# Patient Record
Sex: FEMALE | Race: OTHER | HISPANIC OR LATINO | Employment: OTHER | URBAN - METROPOLITAN AREA
[De-identification: names, ages, dates, MRNs, and addresses within clinical notes are randomized per-mention and may not be internally consistent; named-entity substitution may affect disease eponyms.]

---

## 2017-01-03 ENCOUNTER — ALLSCRIPTS OFFICE VISIT (OUTPATIENT)
Dept: OTHER | Facility: OTHER | Age: 47
End: 2017-01-03

## 2017-01-05 ENCOUNTER — ALLSCRIPTS OFFICE VISIT (OUTPATIENT)
Dept: OTHER | Facility: OTHER | Age: 47
End: 2017-01-05

## 2017-01-27 ENCOUNTER — HOSPITAL ENCOUNTER (OUTPATIENT)
Facility: HOSPITAL | Age: 47
Setting detail: OUTPATIENT SURGERY
Discharge: HOME | End: 2017-01-27
Attending: ORTHOPAEDIC SURGERY | Admitting: ORTHOPAEDIC SURGERY
Payer: COMMERCIAL

## 2017-01-27 VITALS
HEIGHT: 61 IN | WEIGHT: 208 LBS | TEMPERATURE: 97.8 F | OXYGEN SATURATION: 99 % | SYSTOLIC BLOOD PRESSURE: 115 MMHG | BODY MASS INDEX: 39.27 KG/M2 | DIASTOLIC BLOOD PRESSURE: 60 MMHG | HEART RATE: 69 BPM | RESPIRATION RATE: 18 BRPM

## 2017-01-27 RX ORDER — TRAMADOL HYDROCHLORIDE 50 MG/1
50 TABLET ORAL 2 TIMES DAILY
Status: ON HOLD | COMMUNITY
End: 2017-08-02

## 2017-01-27 RX ORDER — GABAPENTIN 300 MG/1
600 CAPSULE ORAL 2 TIMES DAILY
COMMUNITY
End: 2018-08-02

## 2017-01-27 RX ORDER — NAPROXEN 500 MG/1
500 TABLET ORAL 2 TIMES DAILY WITH MEALS
COMMUNITY
End: 2017-11-08 | Stop reason: HOSPADM

## 2017-01-27 RX ORDER — HYDROCODONE BITARTRATE AND ACETAMINOPHEN 5; 325 MG/1; MG/1
2 TABLET ORAL EVERY 6 HOURS PRN
Qty: 30 TABLET | Refills: 0 | Status: SHIPPED | OUTPATIENT
Start: 2017-01-27 | End: 2017-02-06

## 2017-01-27 RX ORDER — GABAPENTIN 300 MG/1
900 CAPSULE ORAL
Status: ON HOLD | COMMUNITY
End: 2017-11-06 | Stop reason: ALTCHOICE

## 2017-01-27 RX ORDER — DIPHENOXYLATE HYDROCHLORIDE AND ATROPINE SULFATE 2.5; .025 MG/1; MG/1
1 TABLET ORAL DAILY
COMMUNITY
End: 2018-08-02

## 2017-01-30 ENCOUNTER — TRANSCRIBE ORDERS (OUTPATIENT)
Dept: ADMINISTRATIVE | Facility: HOSPITAL | Age: 47
End: 2017-01-30

## 2017-01-30 DIAGNOSIS — Z12.31 SCREENING MAMMOGRAM FOR HIGH-RISK PATIENT: Primary | ICD-10-CM

## 2017-02-07 ENCOUNTER — ALLSCRIPTS OFFICE VISIT (OUTPATIENT)
Dept: OTHER | Facility: OTHER | Age: 47
End: 2017-02-07

## 2017-02-08 ENCOUNTER — ALLSCRIPTS OFFICE VISIT (OUTPATIENT)
Dept: OTHER | Facility: OTHER | Age: 47
End: 2017-02-08

## 2017-02-08 LAB — HCG, QUALITATIVE (HISTORICAL): NEGATIVE

## 2017-02-13 ENCOUNTER — OFFICE VISIT (OUTPATIENT)
Dept: URGENT CARE | Age: 47
End: 2017-02-13
Payer: COMMERCIAL

## 2017-02-13 ENCOUNTER — GENERIC CONVERSION - ENCOUNTER (OUTPATIENT)
Dept: OTHER | Facility: OTHER | Age: 47
End: 2017-02-13

## 2017-02-13 PROCEDURE — 99283 EMERGENCY DEPT VISIT LOW MDM: CPT | Performed by: FAMILY MEDICINE

## 2017-02-13 PROCEDURE — G0382 LEV 3 HOSP TYPE B ED VISIT: HCPCS | Performed by: FAMILY MEDICINE

## 2017-02-16 ENCOUNTER — ALLSCRIPTS OFFICE VISIT (OUTPATIENT)
Dept: OTHER | Facility: OTHER | Age: 47
End: 2017-02-16

## 2017-03-03 ENCOUNTER — ALLSCRIPTS OFFICE VISIT (OUTPATIENT)
Dept: OTHER | Facility: OTHER | Age: 47
End: 2017-03-03

## 2017-03-13 ENCOUNTER — HOSPITAL ENCOUNTER (OUTPATIENT)
Dept: RADIOLOGY | Facility: HOSPITAL | Age: 47
Discharge: HOME | End: 2017-03-13
Payer: COMMERCIAL

## 2017-03-13 DIAGNOSIS — Z12.31 SCREENING MAMMOGRAM FOR HIGH-RISK PATIENT: ICD-10-CM

## 2017-03-13 PROCEDURE — G0202 SCR MAMMO BI INCL CAD: HCPCS

## 2017-03-16 ENCOUNTER — ALLSCRIPTS OFFICE VISIT (OUTPATIENT)
Dept: OTHER | Facility: OTHER | Age: 47
End: 2017-03-16

## 2017-03-16 DIAGNOSIS — E66.01 MORBID (SEVERE) OBESITY DUE TO EXCESS CALORIES (HCC): ICD-10-CM

## 2017-03-16 DIAGNOSIS — I83.90 ASYMPTOMATIC VARICOSE VEINS OF LOWER EXTREMITY: ICD-10-CM

## 2017-03-16 DIAGNOSIS — N91.5 OLIGOMENORRHEA: ICD-10-CM

## 2017-03-21 ENCOUNTER — ALLSCRIPTS OFFICE VISIT (OUTPATIENT)
Dept: OTHER | Facility: OTHER | Age: 47
End: 2017-03-21

## 2017-04-06 ENCOUNTER — ALLSCRIPTS OFFICE VISIT (OUTPATIENT)
Dept: OTHER | Facility: OTHER | Age: 47
End: 2017-04-06

## 2017-04-07 ENCOUNTER — APPOINTMENT (OUTPATIENT)
Dept: LAB | Facility: HOSPITAL | Age: 47
End: 2017-04-07
Attending: SURGERY
Payer: COMMERCIAL

## 2017-04-07 ENCOUNTER — TRANSCRIBE ORDERS (OUTPATIENT)
Dept: LAB | Facility: HOSPITAL | Age: 47
End: 2017-04-07

## 2017-04-07 DIAGNOSIS — N91.5 OLIGOMENORRHEA: ICD-10-CM

## 2017-04-07 DIAGNOSIS — E66.01 MORBID (SEVERE) OBESITY DUE TO EXCESS CALORIES (HCC): ICD-10-CM

## 2017-04-07 DIAGNOSIS — I83.90 ASYMPTOMATIC VARICOSE VEINS OF LOWER EXTREMITY: ICD-10-CM

## 2017-04-07 LAB
ALBUMIN SERPL BCP-MCNC: 3.5 G/DL (ref 3.5–5)
ALP SERPL-CCNC: 96 U/L (ref 46–116)
ALT SERPL W P-5'-P-CCNC: 36 U/L (ref 12–78)
ANION GAP SERPL CALCULATED.3IONS-SCNC: 3 MMOL/L (ref 4–13)
AST SERPL W P-5'-P-CCNC: 21 U/L (ref 5–45)
BILIRUB SERPL-MCNC: 0.52 MG/DL (ref 0.2–1)
BUN SERPL-MCNC: 16 MG/DL (ref 5–25)
CALCIUM SERPL-MCNC: 8.6 MG/DL (ref 8.3–10.1)
CHLORIDE SERPL-SCNC: 105 MMOL/L (ref 100–108)
CHOLEST SERPL-MCNC: 216 MG/DL (ref 50–200)
CO2 SERPL-SCNC: 29 MMOL/L (ref 21–32)
CREAT SERPL-MCNC: 0.62 MG/DL (ref 0.6–1.3)
ERYTHROCYTE [DISTWIDTH] IN BLOOD BY AUTOMATED COUNT: 13.4 % (ref 11.6–15.1)
GFR SERPL CREATININE-BSD FRML MDRD: >60 ML/MIN/1.73SQ M
GLUCOSE P FAST SERPL-MCNC: 89 MG/DL (ref 65–99)
HCT VFR BLD AUTO: 40.9 % (ref 34.8–46.1)
HDLC SERPL-MCNC: 39 MG/DL (ref 40–60)
HGB BLD-MCNC: 14 G/DL (ref 11.5–15.4)
LDLC SERPL CALC-MCNC: 149 MG/DL (ref 0–100)
MCH RBC QN AUTO: 31.3 PG (ref 26.8–34.3)
MCHC RBC AUTO-ENTMCNC: 34.2 G/DL (ref 31.4–37.4)
MCV RBC AUTO: 91 FL (ref 82–98)
PLATELET # BLD AUTO: 215 THOUSANDS/UL (ref 149–390)
PMV BLD AUTO: 10.3 FL (ref 8.9–12.7)
POTASSIUM SERPL-SCNC: 4.3 MMOL/L (ref 3.5–5.3)
PROT SERPL-MCNC: 7.1 G/DL (ref 6.4–8.2)
RBC # BLD AUTO: 4.48 MILLION/UL (ref 3.81–5.12)
SODIUM SERPL-SCNC: 137 MMOL/L (ref 136–145)
TRIGL SERPL-MCNC: 139 MG/DL
TSH SERPL DL<=0.05 MIU/L-ACNC: 1.28 UIU/ML (ref 0.36–3.74)
WBC # BLD AUTO: 6.63 THOUSAND/UL (ref 4.31–10.16)

## 2017-04-07 PROCEDURE — 80061 LIPID PANEL: CPT

## 2017-04-07 PROCEDURE — 80053 COMPREHEN METABOLIC PANEL: CPT

## 2017-04-07 PROCEDURE — 84443 ASSAY THYROID STIM HORMONE: CPT

## 2017-04-07 PROCEDURE — 85027 COMPLETE CBC AUTOMATED: CPT

## 2017-04-07 PROCEDURE — 36415 COLL VENOUS BLD VENIPUNCTURE: CPT

## 2017-04-13 ENCOUNTER — ALLSCRIPTS OFFICE VISIT (OUTPATIENT)
Dept: OTHER | Facility: OTHER | Age: 47
End: 2017-04-13

## 2017-04-14 ENCOUNTER — GENERIC CONVERSION - ENCOUNTER (OUTPATIENT)
Dept: OTHER | Facility: OTHER | Age: 47
End: 2017-04-14

## 2017-04-17 ENCOUNTER — GENERIC CONVERSION - ENCOUNTER (OUTPATIENT)
Dept: OTHER | Facility: OTHER | Age: 47
End: 2017-04-17

## 2017-04-25 ENCOUNTER — ALLSCRIPTS OFFICE VISIT (OUTPATIENT)
Dept: OTHER | Facility: OTHER | Age: 47
End: 2017-04-25

## 2017-04-25 DIAGNOSIS — G56.03 BILATERAL CARPAL TUNNEL SYNDROME: ICD-10-CM

## 2017-05-08 ENCOUNTER — ALLSCRIPTS OFFICE VISIT (OUTPATIENT)
Dept: OTHER | Facility: OTHER | Age: 47
End: 2017-05-08

## 2017-05-09 ENCOUNTER — GENERIC CONVERSION - ENCOUNTER (OUTPATIENT)
Dept: OTHER | Facility: OTHER | Age: 47
End: 2017-05-09

## 2017-05-11 ENCOUNTER — GENERIC CONVERSION - ENCOUNTER (OUTPATIENT)
Dept: OTHER | Facility: OTHER | Age: 47
End: 2017-05-11

## 2017-06-05 ENCOUNTER — GENERIC CONVERSION - ENCOUNTER (OUTPATIENT)
Dept: OTHER | Facility: OTHER | Age: 47
End: 2017-06-05

## 2017-06-05 ENCOUNTER — APPOINTMENT (OUTPATIENT)
Dept: PHYSICAL THERAPY | Facility: REHABILITATION | Age: 47
End: 2017-06-05
Payer: COMMERCIAL

## 2017-06-05 PROCEDURE — 97162 PT EVAL MOD COMPLEX 30 MIN: CPT

## 2017-06-05 PROCEDURE — 97110 THERAPEUTIC EXERCISES: CPT

## 2017-06-07 ENCOUNTER — APPOINTMENT (OUTPATIENT)
Dept: PHYSICAL THERAPY | Facility: REHABILITATION | Age: 47
End: 2017-06-07
Payer: COMMERCIAL

## 2017-06-07 PROCEDURE — 97110 THERAPEUTIC EXERCISES: CPT

## 2017-06-12 ENCOUNTER — APPOINTMENT (OUTPATIENT)
Dept: PHYSICAL THERAPY | Facility: REHABILITATION | Age: 47
End: 2017-06-12
Payer: COMMERCIAL

## 2017-06-12 PROCEDURE — 97110 THERAPEUTIC EXERCISES: CPT

## 2017-06-14 ENCOUNTER — APPOINTMENT (OUTPATIENT)
Dept: PHYSICAL THERAPY | Facility: REHABILITATION | Age: 47
End: 2017-06-14
Payer: COMMERCIAL

## 2017-06-15 ENCOUNTER — APPOINTMENT (OUTPATIENT)
Dept: PHYSICAL THERAPY | Facility: REHABILITATION | Age: 47
End: 2017-06-15
Payer: COMMERCIAL

## 2017-06-15 PROCEDURE — 97110 THERAPEUTIC EXERCISES: CPT

## 2017-06-15 PROCEDURE — 97140 MANUAL THERAPY 1/> REGIONS: CPT

## 2017-06-16 ENCOUNTER — HOSPITAL ENCOUNTER (OUTPATIENT)
Facility: HOSPITAL | Age: 47
Setting detail: OUTPATIENT SURGERY
Discharge: HOME | End: 2017-06-16
Attending: ORTHOPAEDIC SURGERY | Admitting: ORTHOPAEDIC SURGERY
Payer: COMMERCIAL

## 2017-06-16 VITALS
BODY MASS INDEX: 40.78 KG/M2 | TEMPERATURE: 97.7 F | HEIGHT: 61 IN | HEART RATE: 60 BPM | RESPIRATION RATE: 20 BRPM | DIASTOLIC BLOOD PRESSURE: 56 MMHG | OXYGEN SATURATION: 99 % | SYSTOLIC BLOOD PRESSURE: 109 MMHG | WEIGHT: 216 LBS

## 2017-06-16 RX ORDER — MAGNESIUM HYDROXIDE 1200 MG/15ML
LIQUID ORAL AS NEEDED
Status: DISCONTINUED | OUTPATIENT
Start: 2017-06-16 | End: 2017-06-16 | Stop reason: HOSPADM

## 2017-06-16 RX ORDER — HYDROCODONE BITARTRATE AND ACETAMINOPHEN 5; 325 MG/1; MG/1
1 TABLET ORAL EVERY 6 HOURS PRN
Qty: 10 TABLET | Refills: 0 | Status: SHIPPED | OUTPATIENT
Start: 2017-06-16 | End: 2017-06-26

## 2017-06-16 RX ORDER — IBUPROFEN 600 MG/1
600 TABLET ORAL EVERY 6 HOURS PRN
Qty: 30 TABLET | Refills: 0 | Status: ON HOLD | OUTPATIENT
Start: 2017-06-16 | End: 2017-08-02

## 2017-06-19 ENCOUNTER — ALLSCRIPTS OFFICE VISIT (OUTPATIENT)
Dept: OTHER | Facility: OTHER | Age: 47
End: 2017-06-19

## 2017-06-19 ENCOUNTER — APPOINTMENT (OUTPATIENT)
Dept: PHYSICAL THERAPY | Facility: REHABILITATION | Age: 47
End: 2017-06-19
Payer: COMMERCIAL

## 2017-06-21 ENCOUNTER — APPOINTMENT (OUTPATIENT)
Dept: PHYSICAL THERAPY | Facility: REHABILITATION | Age: 47
End: 2017-06-21
Payer: COMMERCIAL

## 2017-06-21 PROCEDURE — 97140 MANUAL THERAPY 1/> REGIONS: CPT

## 2017-06-21 PROCEDURE — 97110 THERAPEUTIC EXERCISES: CPT

## 2017-06-23 ENCOUNTER — APPOINTMENT (OUTPATIENT)
Dept: PHYSICAL THERAPY | Facility: REHABILITATION | Age: 47
End: 2017-06-23
Payer: COMMERCIAL

## 2017-06-23 ENCOUNTER — GENERIC CONVERSION - ENCOUNTER (OUTPATIENT)
Dept: OTHER | Facility: OTHER | Age: 47
End: 2017-06-23

## 2017-06-23 PROCEDURE — 97112 NEUROMUSCULAR REEDUCATION: CPT

## 2017-06-23 PROCEDURE — 97140 MANUAL THERAPY 1/> REGIONS: CPT

## 2017-06-23 PROCEDURE — 97110 THERAPEUTIC EXERCISES: CPT

## 2017-06-26 ENCOUNTER — APPOINTMENT (OUTPATIENT)
Dept: PHYSICAL THERAPY | Facility: REHABILITATION | Age: 47
End: 2017-06-26
Payer: COMMERCIAL

## 2017-06-26 PROCEDURE — 97140 MANUAL THERAPY 1/> REGIONS: CPT

## 2017-06-26 PROCEDURE — 97110 THERAPEUTIC EXERCISES: CPT

## 2017-06-27 ENCOUNTER — ALLSCRIPTS OFFICE VISIT (OUTPATIENT)
Dept: OTHER | Facility: OTHER | Age: 47
End: 2017-06-27

## 2017-06-28 ENCOUNTER — APPOINTMENT (OUTPATIENT)
Dept: PHYSICAL THERAPY | Facility: REHABILITATION | Age: 47
End: 2017-06-28
Payer: COMMERCIAL

## 2017-06-28 PROCEDURE — 97110 THERAPEUTIC EXERCISES: CPT

## 2017-06-28 PROCEDURE — 97112 NEUROMUSCULAR REEDUCATION: CPT

## 2017-06-28 PROCEDURE — 97140 MANUAL THERAPY 1/> REGIONS: CPT

## 2017-07-03 ENCOUNTER — APPOINTMENT (OUTPATIENT)
Dept: PHYSICAL THERAPY | Facility: REHABILITATION | Age: 47
End: 2017-07-03
Payer: COMMERCIAL

## 2017-07-03 PROCEDURE — 97140 MANUAL THERAPY 1/> REGIONS: CPT

## 2017-07-03 PROCEDURE — 97112 NEUROMUSCULAR REEDUCATION: CPT

## 2017-07-03 PROCEDURE — 97110 THERAPEUTIC EXERCISES: CPT

## 2017-07-06 ENCOUNTER — APPOINTMENT (OUTPATIENT)
Dept: PHYSICAL THERAPY | Facility: REHABILITATION | Age: 47
End: 2017-07-06
Payer: COMMERCIAL

## 2017-07-06 PROCEDURE — 97110 THERAPEUTIC EXERCISES: CPT

## 2017-07-06 PROCEDURE — 97140 MANUAL THERAPY 1/> REGIONS: CPT

## 2017-07-10 ENCOUNTER — APPOINTMENT (OUTPATIENT)
Dept: PHYSICAL THERAPY | Facility: REHABILITATION | Age: 47
End: 2017-07-10
Payer: COMMERCIAL

## 2017-07-10 PROCEDURE — 97110 THERAPEUTIC EXERCISES: CPT

## 2017-07-10 PROCEDURE — 97140 MANUAL THERAPY 1/> REGIONS: CPT

## 2017-07-12 ENCOUNTER — APPOINTMENT (OUTPATIENT)
Dept: PHYSICAL THERAPY | Facility: REHABILITATION | Age: 47
End: 2017-07-12
Payer: COMMERCIAL

## 2017-07-14 ENCOUNTER — GENERIC CONVERSION - ENCOUNTER (OUTPATIENT)
Dept: OTHER | Facility: OTHER | Age: 47
End: 2017-07-14

## 2017-07-14 ENCOUNTER — APPOINTMENT (OUTPATIENT)
Dept: PHYSICAL THERAPY | Facility: REHABILITATION | Age: 47
End: 2017-07-14
Payer: COMMERCIAL

## 2017-07-14 PROCEDURE — 97112 NEUROMUSCULAR REEDUCATION: CPT

## 2017-07-14 PROCEDURE — 97140 MANUAL THERAPY 1/> REGIONS: CPT

## 2017-07-14 PROCEDURE — 97110 THERAPEUTIC EXERCISES: CPT

## 2017-07-17 ENCOUNTER — APPOINTMENT (OUTPATIENT)
Dept: PHYSICAL THERAPY | Facility: REHABILITATION | Age: 47
End: 2017-07-17
Payer: COMMERCIAL

## 2017-07-17 PROCEDURE — 97140 MANUAL THERAPY 1/> REGIONS: CPT

## 2017-07-17 PROCEDURE — 97110 THERAPEUTIC EXERCISES: CPT

## 2017-07-19 ENCOUNTER — APPOINTMENT (OUTPATIENT)
Dept: PHYSICAL THERAPY | Facility: REHABILITATION | Age: 47
End: 2017-07-19
Payer: COMMERCIAL

## 2017-07-19 PROCEDURE — 97110 THERAPEUTIC EXERCISES: CPT

## 2017-07-19 PROCEDURE — 97112 NEUROMUSCULAR REEDUCATION: CPT

## 2017-07-19 PROCEDURE — 97140 MANUAL THERAPY 1/> REGIONS: CPT

## 2017-07-21 ENCOUNTER — GENERIC CONVERSION - ENCOUNTER (OUTPATIENT)
Dept: OTHER | Facility: OTHER | Age: 47
End: 2017-07-21

## 2017-07-24 ENCOUNTER — APPOINTMENT (OUTPATIENT)
Dept: PHYSICAL THERAPY | Facility: REHABILITATION | Age: 47
End: 2017-07-24
Payer: COMMERCIAL

## 2017-07-25 ENCOUNTER — ALLSCRIPTS OFFICE VISIT (OUTPATIENT)
Dept: OTHER | Facility: OTHER | Age: 47
End: 2017-07-25

## 2017-07-26 ENCOUNTER — APPOINTMENT (OUTPATIENT)
Dept: PHYSICAL THERAPY | Facility: REHABILITATION | Age: 47
End: 2017-07-26
Payer: COMMERCIAL

## 2017-07-27 ENCOUNTER — ALLSCRIPTS OFFICE VISIT (OUTPATIENT)
Dept: OTHER | Facility: OTHER | Age: 47
End: 2017-07-27

## 2017-08-01 ENCOUNTER — ANESTHESIA EVENT (OUTPATIENT)
Dept: GASTROENTEROLOGY | Facility: HOSPITAL | Age: 47
End: 2017-08-01
Payer: COMMERCIAL

## 2017-08-02 ENCOUNTER — HOSPITAL ENCOUNTER (OUTPATIENT)
Facility: HOSPITAL | Age: 47
Setting detail: OUTPATIENT SURGERY
Discharge: HOME | End: 2017-08-02
Attending: SURGERY | Admitting: SURGERY
Payer: COMMERCIAL

## 2017-08-02 ENCOUNTER — ANESTHESIA (OUTPATIENT)
Dept: GASTROENTEROLOGY | Facility: HOSPITAL | Age: 47
End: 2017-08-02
Payer: COMMERCIAL

## 2017-08-02 VITALS
RESPIRATION RATE: 16 BRPM | BODY MASS INDEX: 39.65 KG/M2 | SYSTOLIC BLOOD PRESSURE: 111 MMHG | WEIGHT: 210 LBS | TEMPERATURE: 96.5 F | HEART RATE: 62 BPM | DIASTOLIC BLOOD PRESSURE: 56 MMHG | HEIGHT: 61 IN | OXYGEN SATURATION: 98 %

## 2017-08-02 DIAGNOSIS — E66.01 MORBID (SEVERE) OBESITY DUE TO EXCESS CALORIES (HCC): ICD-10-CM

## 2017-08-02 LAB — EXT PREGNANCY TEST URINE: NEGATIVE

## 2017-08-02 PROCEDURE — 81025 URINE PREGNANCY TEST: CPT | Performed by: ANESTHESIOLOGY

## 2017-08-02 PROCEDURE — 88305 TISSUE EXAM BY PATHOLOGIST: CPT | Performed by: SURGERY

## 2017-08-02 PROCEDURE — 88342 IMHCHEM/IMCYTCHM 1ST ANTB: CPT | Performed by: SURGERY

## 2017-08-02 RX ORDER — LANOLIN ALCOHOL/MO/W.PET/CERES
1 CREAM (GRAM) TOPICAL 3 TIMES DAILY
COMMUNITY
End: 2017-10-12

## 2017-08-02 RX ORDER — OMEPRAZOLE 20 MG/1
20 TABLET, DELAYED RELEASE ORAL DAILY
Qty: 30 TABLET | Refills: 0 | Status: SHIPPED | OUTPATIENT
Start: 2017-08-02 | End: 2017-10-12

## 2017-08-02 RX ORDER — PERPHENAZINE/AMITRIPTYLINE HCL 4MG-10MG
TABLET ORAL
COMMUNITY
End: 2017-10-12

## 2017-08-02 RX ORDER — MELATONIN 3 MG
LOZENGE ORAL
COMMUNITY
End: 2017-10-12

## 2017-08-02 RX ORDER — AMPICILLIN TRIHYDRATE 250 MG
500 CAPSULE ORAL DAILY
Status: ON HOLD | COMMUNITY
End: 2017-11-06 | Stop reason: ALTCHOICE

## 2017-08-02 RX ORDER — PROPOFOL 10 MG/ML
INJECTION, EMULSION INTRAVENOUS AS NEEDED
Status: DISCONTINUED | OUTPATIENT
Start: 2017-08-02 | End: 2017-08-02 | Stop reason: SURG

## 2017-08-02 RX ORDER — SODIUM CHLORIDE 9 MG/ML
125 INJECTION, SOLUTION INTRAVENOUS CONTINUOUS
Status: DISCONTINUED | OUTPATIENT
Start: 2017-08-02 | End: 2017-08-02 | Stop reason: HOSPADM

## 2017-08-02 RX ADMIN — PROPOFOL 110 MG: 10 INJECTION, EMULSION INTRAVENOUS at 07:59

## 2017-08-02 RX ADMIN — PROPOFOL 20 MG: 10 INJECTION, EMULSION INTRAVENOUS at 08:02

## 2017-08-02 RX ADMIN — SODIUM CHLORIDE 125 ML/HR: 0.9 INJECTION, SOLUTION INTRAVENOUS at 07:02

## 2017-08-14 ENCOUNTER — ALLSCRIPTS OFFICE VISIT (OUTPATIENT)
Dept: OTHER | Facility: OTHER | Age: 47
End: 2017-08-14

## 2017-09-05 ENCOUNTER — GENERIC CONVERSION - ENCOUNTER (OUTPATIENT)
Dept: OTHER | Facility: OTHER | Age: 47
End: 2017-09-05

## 2017-09-25 ENCOUNTER — GENERIC CONVERSION - ENCOUNTER (OUTPATIENT)
Dept: OTHER | Facility: OTHER | Age: 47
End: 2017-09-25

## 2017-10-12 ENCOUNTER — ANESTHESIA EVENT (OUTPATIENT)
Dept: PERIOP | Facility: HOSPITAL | Age: 47
DRG: 403 | End: 2017-10-12
Payer: COMMERCIAL

## 2017-10-12 ENCOUNTER — ALLSCRIPTS OFFICE VISIT (OUTPATIENT)
Dept: OTHER | Facility: OTHER | Age: 47
End: 2017-10-12

## 2017-10-12 NOTE — ANESTHESIA PREPROCEDURE EVALUATION
Review of Systems/Medical History  Patient summary reviewed  Chart reviewed      Cardiovascular  Negative cardio ROS Hyperlipidemia,    Pulmonary  Smoker ex-smoker , ,        GI/Hepatic  Negative GI/hepatic ROS   GERD poorly controlled,        Negative  ROS        Endo/Other  Negative endo/other ROS History of thyroid disease (thyroid nodules) ,      GYN       Hematology  Negative hematology ROS      Musculoskeletal  Obesity  morbid obesity, Back pain , chronic back pain and lumbar pain,   Comment: achilles tendinitis bilat      Neurology  Negative neurology ROS      Psychology   Negative psychology ROS            Physical Exam    Airway    Mallampati score: II  TM Distance: >3 FB  Neck ROM: full     Dental   No notable dental hx     Cardiovascular  Comment: Negative ROS, Rhythm: regular, Rate: normal, Cardiovascular exam normal    Pulmonary  Pulmonary exam normal Breath sounds clear to auscultation,     Other Findings        Anesthesia Plan  ASA Score- 3       Anesthesia Type- general with ASA Monitors  Additional Monitors:   Airway Plan:           Induction- intravenous and rapid sequence induction  Informed Consent- Anesthetic plan and risks discussed with patient

## 2017-10-29 NOTE — PROGRESS NOTES
Assessment    1  Morbid obesity (278 01) (E66 01)   2  Dyslipidemia (272 4) (E78 5)   3  Midline thoracic back pain (724 1) (M54 6)    Plan  Morbid obesity    · From  Omeprazole 20 MG Oral Capsule Delayed Release  To Omeprazole 20MG Oral Capsule Delayed Release TAKE 1 CAPSULE DAILY EVERY MORNING BEFOREBREAKFAST   Rx By: Christopher Doherty; Dispense: 90 Days ; #:90 Capsule Delayed Release; Refill: 3;For: Morbid obesity; EZLALEM = N; Record; Last Updated By: Gracie Womack; 10/12/2017 2:06:01 PM    Discussion/Summary  Discussion Summary:   The patient presented to review the preoperative workup and see if bariatric surgery is appropriate and indicated following the extensive preoperative workup and the enrollment in our weight loss program  Preoperative workup was complete  Results were reviewed with the patient including the blood work results and the endoscopy findings and the biopsy results  We also reviewed the cardiology evaluation  I believe that the patient will be a good candidate for LRYGB  Patient will need to start the 2 week liquid diet prior to surgery  and benefits explained one more time to the patient  Alternatives to surgery and alternative forms of surgery were also explained  Post-surgical commitment and after care programs were explained  Consent was signed  Questions were answered and concerns were addressed  Patient wishes to proceed  As per [address] [place] [address] guidelines, I had a discussion with the patient regarding his CODE STATUS in the perioperative period and the patient is level 1 or FULL CODE STATUS  Self Referrals:   Self Referrals: No      Chief Complaint  Chief Complaint Free Text Note Form: Patient is in the office for final h&p,rny  Scheduled for surgery on 11/06/2017  Review of Systems  Complete-Female:  Constitutional: No fever, no chills, feels well, no tiredness, no recent weight gain or weight loss    Eyes: No complaints of eye pain, no red eyes, no eyesight problems, no discharge, no dry eyes, no itching of eyes  ENT: no complaints of earache, no loss of hearing, no nose bleeds, no nasal discharge, no sore throat, no hoarseness  Cardiovascular: No complaints of slow heart rate, no fast heart rate, no chest pain, no palpitations, no leg claudication, no lower extremity edema  Respiratory: No complaints of shortness of breath, no wheezing, no cough, no SOB on exertion, no orthopnea, no PND  Gastrointestinal: No complaints of abdominal pain, no constipation, no nausea or vomiting, no diarrhea, no bloody stools  Genitourinary: No complaints of dysuria, no incontinence, no pelvic pain, no dysmenorrhea, no vaginal discharge or bleeding  Musculoskeletal: No complaints of arthralgias, no myalgias, no joint swelling or stiffness, no limb pain or swelling  Integumentary: No complaints of skin rash or lesions, no itching, no skin wounds, no breast pain or lump  Neurological: No complaints of headache, no confusion, no convulsions, no numbness, no dizziness or fainting, no tingling, no limb weakness, no difficulty walking  Psychiatric: Not suicidal, no sleep disturbance, no anxiety or depression, no change in personality, no emotional problems  Endocrine: No complaints of proptosis, no hot flashes, no muscle weakness, no deepening of the voice, no feelings of weakness  Hematologic/Lymphatic: No complaints of swollen glands, no swollen glands in the neck, does not bleed easily, does not bruise easily  Active Problems  1  Ankle pain (719 47) (M25 579)   2  Asymptomatic varicose veins, unspecified laterality (454 9) (I83 90)   3  Bilateral carpal tunnel syndrome (354 0) (G56 03)   4  Bradycardia, pre-operative cardiovascular examination (R38 86,834  89) (Z01 810,R00 1)   5  Bullae (709 8) (R23 8)   6  Carpal tunnel syndrome on left (354 0) (G56 02)   7  Carpal tunnel syndrome on right (354 0) (G56 01)   8  Dyshidrotic eczema (705 81) (L30 1)   9  Dyslipidemia (272 4) (E78 5)   10  Eczema (692 9) (L30 9)   11  Exposure to potentially hazardous body fluids (V15 85) (Z77 21)   12  Generalized obesity (278 00) (E66 9)   13  Herpes zoster (053 9) (B02 9)   14  History of thyroid nodule (V12 29) (Z86 39)   15  Large breasts (611 1) (N62)   16  Midline thoracic back pain (724 1) (M54 6)   17  Morbid obesity (278 01) (E66 01)   18  Oligomenorrhea (626 1) (N91 5)   19  Onychomycosis (110 1) (B35 1)   20  Paresthesia of arm (782 0) (R20 2)   21  Perimenopause (627 2) (N95 1)   22  Plantar fasciitis (728 71) (M72 2)   23  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)   24  Preoperative cardiovascular examination (V72 81) (Z01 810)   25  Pre-operative laboratory examination (V72 63) (Z01 812)   26  Screening for breast cancer (V76 10) (Z12 31)   27  Seborrheic keratosis (702 19) (L82 1)   28  Segmental and somatic dysfunction of cervical region (739 1) (M99 01)   29  Somatic dysfunction of spine, thoracic (739 2) (M99 02)   30  Tendonitis, Achilles (726 71) (M76 60)   31  Tendonitis, Achilles (726 71) (M76 60)   32  Thoracic myofascial strain (847 1) (S29 019A)   33  Tingling (782 0) (R20 2)   34  Trigger middle finger of right hand (727 03) (M65 331)    Past Medical History  1  Acute conjunctivitis (372 00) (H10 30)   2  Acute frontal sinusitis (461 1) (J01 10)   3  History of Bacterial vaginosis (616 10,041 9) (N76 0,B96 89)   4  History of Depression (311) (F32 9)   5  History of Encounter for consultation (V65 9) (Z71 9)   6  History of Encounter for screening mammogram for breast cancer (V76 12) (Z12 31)   7  History of Generalized obesity (278 00) (E66 9)   8  History of Heel pain (729 5) (M79 673)   9  History of acute sinusitis (V12 69) (Z87 09)   10  History of carpal tunnel syndrome (V12 49) (Z86 69)   11  History of viral warts (V12 09) (Z86 19)   12  History of Influenza vaccine needed (V04 81) (Z23)   13  History of Itching in the vaginal area (698 1) (L29 8)   14   History of Pain, (Evaluate:14Oct2017); Last Rx:09Oct2017; Status: ACTIVE - Retrospective By Protocol Authorization Ordered   7  Turmeric 500 MG Oral Capsule; TAKE 2 CAPSULE Twice daily; Therapy: (Recorded:06Apr2017) to Recorded    Allergies  1  No Known Drug Allergies    Vitals  Vital Signs    Recorded: [id number] 02:01PM   Temperature 97 1 F   Heart Rate 63   Respiration 15   Systolic 476   Diastolic 80   Height 5 ft 1 in   Weight 215 lb    BMI Calculated 40 62   BSA Calculated 1 95       Physical Exam   Constitutional  General appearance: No acute distress, well appearing and well nourished  Pulmonary  Respiratory effort: No increased work of breathing or signs of respiratory distress  Auscultation of lungs: Clear to auscultation  Cardiovascular  Palpation of heart: Normal PMI, no thrills  Auscultation of heart: Normal rate and rhythm, normal S1 and S2, without murmurs  Abdomen  Abdomen: Non-tender, no masses  Liver and spleen: No hepatomegaly or splenomegaly  Future Appointments    Date/Time Provider Specialty Site   11/16/2017 10:30 AM Dionicia Severin, M D  General Surgery [facility]   10/30/2017 02:10 PM Specialty Clinic, Podiatry  [facility] [place] [facility]   11/14/2017 01:40 PM Billie Walker DO Family Medicine [address] [place]   12/05/2017 01:20 PM Billie Walker DO Family Medicine [address]       Signatures   Electronically signed by :  ADELAIDA Stone ; Oct 12 2017  3:00PM EST                       (Author)

## 2017-10-30 ENCOUNTER — GENERIC CONVERSION - ENCOUNTER (OUTPATIENT)
Dept: OTHER | Facility: OTHER | Age: 47
End: 2017-10-30

## 2017-10-31 ENCOUNTER — GENERIC CONVERSION - ENCOUNTER (OUTPATIENT)
Dept: OTHER | Facility: OTHER | Age: 47
End: 2017-10-31

## 2017-11-02 ENCOUNTER — GENERIC CONVERSION - ENCOUNTER (OUTPATIENT)
Dept: OTHER | Facility: OTHER | Age: 47
End: 2017-11-02

## 2017-11-06 ENCOUNTER — HOSPITAL ENCOUNTER (INPATIENT)
Facility: HOSPITAL | Age: 47
LOS: 2 days | Discharge: HOME | DRG: 403 | End: 2017-11-08
Attending: SURGERY | Admitting: SURGERY
Payer: COMMERCIAL

## 2017-11-06 ENCOUNTER — ANESTHESIA (OUTPATIENT)
Dept: PERIOP | Facility: HOSPITAL | Age: 47
DRG: 403 | End: 2017-11-06
Payer: COMMERCIAL

## 2017-11-06 DIAGNOSIS — E78.5 HYPERLIPIDEMIA: ICD-10-CM

## 2017-11-06 DIAGNOSIS — E66.01 MORBID (SEVERE) OBESITY DUE TO EXCESS CALORIES (HCC): ICD-10-CM

## 2017-11-06 LAB — EXT PREGNANCY TEST URINE: NEGATIVE

## 2017-11-06 PROCEDURE — C9113 INJ PANTOPRAZOLE SODIUM, VIA: HCPCS | Performed by: SURGERY

## 2017-11-06 PROCEDURE — 0BUT4JZ SUPPLEMENT DIAPHRAGM WITH SYNTHETIC SUBSTITUTE, PERCUTANEOUS ENDOSCOPIC APPROACH: ICD-10-PCS | Performed by: SURGERY

## 2017-11-06 PROCEDURE — 0D164ZA BYPASS STOMACH TO JEJUNUM, PERCUTANEOUS ENDOSCOPIC APPROACH: ICD-10-PCS | Performed by: SURGERY

## 2017-11-06 PROCEDURE — 88302 TISSUE EXAM BY PATHOLOGIST: CPT | Performed by: SURGERY

## 2017-11-06 PROCEDURE — 0DJ08ZZ INSPECTION OF UPPER INTESTINAL TRACT, VIA NATURAL OR ARTIFICIAL OPENING ENDOSCOPIC: ICD-10-PCS | Performed by: SURGERY

## 2017-11-06 PROCEDURE — C1781 MESH (IMPLANTABLE): HCPCS | Performed by: SURGERY

## 2017-11-06 PROCEDURE — 81025 URINE PREGNANCY TEST: CPT | Performed by: ANESTHESIOLOGY

## 2017-11-06 DEVICE — MESH BIO-A MESH GORE: Type: IMPLANTABLE DEVICE | Site: ESOPHAGUS | Status: FUNCTIONAL

## 2017-11-06 RX ORDER — HEPARIN SODIUM 5000 [USP'U]/ML
5000 INJECTION, SOLUTION INTRAVENOUS; SUBCUTANEOUS ONCE
Status: COMPLETED | OUTPATIENT
Start: 2017-11-06 | End: 2017-11-06

## 2017-11-06 RX ORDER — SODIUM CHLORIDE, SODIUM LACTATE, POTASSIUM CHLORIDE, CALCIUM CHLORIDE 600; 310; 30; 20 MG/100ML; MG/100ML; MG/100ML; MG/100ML
50 INJECTION, SOLUTION INTRAVENOUS CONTINUOUS
Status: DISCONTINUED | OUTPATIENT
Start: 2017-11-06 | End: 2017-11-08 | Stop reason: HOSPADM

## 2017-11-06 RX ORDER — BUPIVACAINE HYDROCHLORIDE AND EPINEPHRINE 5; 5 MG/ML; UG/ML
INJECTION, SOLUTION PERINEURAL AS NEEDED
Status: DISCONTINUED | OUTPATIENT
Start: 2017-11-06 | End: 2017-11-06 | Stop reason: HOSPADM

## 2017-11-06 RX ORDER — EPHEDRINE SULFATE 50 MG/ML
INJECTION, SOLUTION INTRAVENOUS AS NEEDED
Status: DISCONTINUED | OUTPATIENT
Start: 2017-11-06 | End: 2017-11-06 | Stop reason: SURG

## 2017-11-06 RX ORDER — LIDOCAINE HYDROCHLORIDE 10 MG/ML
INJECTION, SOLUTION INFILTRATION; PERINEURAL AS NEEDED
Status: DISCONTINUED | OUTPATIENT
Start: 2017-11-06 | End: 2017-11-06 | Stop reason: SURG

## 2017-11-06 RX ORDER — MIDAZOLAM HYDROCHLORIDE 1 MG/ML
INJECTION INTRAMUSCULAR; INTRAVENOUS AS NEEDED
Status: DISCONTINUED | OUTPATIENT
Start: 2017-11-06 | End: 2017-11-06 | Stop reason: SURG

## 2017-11-06 RX ORDER — ONDANSETRON 2 MG/ML
INJECTION INTRAMUSCULAR; INTRAVENOUS AS NEEDED
Status: DISCONTINUED | OUTPATIENT
Start: 2017-11-06 | End: 2017-11-06 | Stop reason: SURG

## 2017-11-06 RX ORDER — ACETAMINOPHEN 160 MG/5ML
325 SUSPENSION, ORAL (FINAL DOSE FORM) ORAL EVERY 4 HOURS PRN
Status: DISCONTINUED | OUTPATIENT
Start: 2017-11-06 | End: 2017-11-08 | Stop reason: HOSPADM

## 2017-11-06 RX ORDER — METOCLOPRAMIDE HYDROCHLORIDE 5 MG/ML
10 INJECTION INTRAMUSCULAR; INTRAVENOUS EVERY 6 HOURS SCHEDULED
Status: DISCONTINUED | OUTPATIENT
Start: 2017-11-06 | End: 2017-11-08 | Stop reason: HOSPADM

## 2017-11-06 RX ORDER — PHENOL 1.4 %
10 AEROSOL, SPRAY (ML) MUCOUS MEMBRANE
COMMUNITY

## 2017-11-06 RX ORDER — FENTANYL CITRATE 50 UG/ML
INJECTION, SOLUTION INTRAMUSCULAR; INTRAVENOUS AS NEEDED
Status: DISCONTINUED | OUTPATIENT
Start: 2017-11-06 | End: 2017-11-06 | Stop reason: SURG

## 2017-11-06 RX ORDER — PROMETHAZINE HYDROCHLORIDE 25 MG/ML
25 INJECTION, SOLUTION INTRAMUSCULAR; INTRAVENOUS EVERY 6 HOURS PRN
Status: DISCONTINUED | OUTPATIENT
Start: 2017-11-06 | End: 2017-11-08 | Stop reason: HOSPADM

## 2017-11-06 RX ORDER — ONDANSETRON 2 MG/ML
4 INJECTION INTRAMUSCULAR; INTRAVENOUS ONCE AS NEEDED
Status: COMPLETED | OUTPATIENT
Start: 2017-11-06 | End: 2017-11-06

## 2017-11-06 RX ORDER — MAGNESIUM HYDROXIDE 1200 MG/15ML
LIQUID ORAL AS NEEDED
Status: DISCONTINUED | OUTPATIENT
Start: 2017-11-06 | End: 2017-11-06 | Stop reason: HOSPADM

## 2017-11-06 RX ORDER — OXYCODONE HCL 5 MG/5 ML
5 SOLUTION, ORAL ORAL EVERY 4 HOURS PRN
Status: DISCONTINUED | OUTPATIENT
Start: 2017-11-06 | End: 2017-11-08 | Stop reason: HOSPADM

## 2017-11-06 RX ORDER — MORPHINE SULFATE 4 MG/ML
4 INJECTION, SOLUTION INTRAMUSCULAR; INTRAVENOUS EVERY 2 HOUR PRN
Status: DISCONTINUED | OUTPATIENT
Start: 2017-11-06 | End: 2017-11-08 | Stop reason: HOSPADM

## 2017-11-06 RX ORDER — OMEPRAZOLE 20 MG/1
20 CAPSULE, DELAYED RELEASE ORAL DAILY
COMMUNITY
End: 2018-05-18 | Stop reason: SDUPTHER

## 2017-11-06 RX ORDER — ROCURONIUM BROMIDE 10 MG/ML
INJECTION, SOLUTION INTRAVENOUS AS NEEDED
Status: DISCONTINUED | OUTPATIENT
Start: 2017-11-06 | End: 2017-11-06 | Stop reason: SURG

## 2017-11-06 RX ORDER — FENTANYL CITRATE/PF 50 MCG/ML
25 SYRINGE (ML) INJECTION
Status: COMPLETED | OUTPATIENT
Start: 2017-11-06 | End: 2017-11-06

## 2017-11-06 RX ORDER — ACETAMINOPHEN 160 MG/5ML
320 SUSPENSION, ORAL (FINAL DOSE FORM) ORAL EVERY 4 HOURS PRN
Status: DISCONTINUED | OUTPATIENT
Start: 2017-11-06 | End: 2017-11-08 | Stop reason: HOSPADM

## 2017-11-06 RX ORDER — PROPOFOL 10 MG/ML
INJECTION, EMULSION INTRAVENOUS AS NEEDED
Status: DISCONTINUED | OUTPATIENT
Start: 2017-11-06 | End: 2017-11-06 | Stop reason: SURG

## 2017-11-06 RX ORDER — OXYCODONE HCL 5 MG/5 ML
10 SOLUTION, ORAL ORAL EVERY 4 HOURS PRN
Status: DISCONTINUED | OUTPATIENT
Start: 2017-11-06 | End: 2017-11-08 | Stop reason: HOSPADM

## 2017-11-06 RX ORDER — GLYCOPYRROLATE 0.2 MG/ML
INJECTION INTRAMUSCULAR; INTRAVENOUS AS NEEDED
Status: DISCONTINUED | OUTPATIENT
Start: 2017-11-06 | End: 2017-11-06 | Stop reason: SURG

## 2017-11-06 RX ORDER — DIAZEPAM 5 MG/ML
5 INJECTION, SOLUTION INTRAMUSCULAR; INTRAVENOUS EVERY 12 HOURS
Status: DISCONTINUED | OUTPATIENT
Start: 2017-11-06 | End: 2017-11-08 | Stop reason: HOSPADM

## 2017-11-06 RX ORDER — PANTOPRAZOLE SODIUM 40 MG/1
40 INJECTION, POWDER, FOR SOLUTION INTRAVENOUS
Status: DISCONTINUED | OUTPATIENT
Start: 2017-11-06 | End: 2017-11-08 | Stop reason: HOSPADM

## 2017-11-06 RX ORDER — MORPHINE SULFATE 2 MG/ML
2 INJECTION, SOLUTION INTRAMUSCULAR; INTRAVENOUS EVERY 2 HOUR PRN
Status: DISCONTINUED | OUTPATIENT
Start: 2017-11-06 | End: 2017-11-08 | Stop reason: HOSPADM

## 2017-11-06 RX ORDER — SODIUM CHLORIDE 9 MG/ML
125 INJECTION, SOLUTION INTRAVENOUS CONTINUOUS
Status: DISCONTINUED | OUTPATIENT
Start: 2017-11-06 | End: 2017-11-06 | Stop reason: HOSPADM

## 2017-11-06 RX ORDER — ONDANSETRON 2 MG/ML
4 INJECTION INTRAMUSCULAR; INTRAVENOUS EVERY 4 HOURS PRN
Status: DISCONTINUED | OUTPATIENT
Start: 2017-11-06 | End: 2017-11-08 | Stop reason: HOSPADM

## 2017-11-06 RX ADMIN — TRIMETHOBENZAMIDE HYDROCHLORIDE 200 MG: 100 INJECTION INTRAMUSCULAR at 11:26

## 2017-11-06 RX ADMIN — PROPOFOL 200 MG: 10 INJECTION, EMULSION INTRAVENOUS at 07:35

## 2017-11-06 RX ADMIN — CEFAZOLIN SODIUM 2000 MG: 2 SOLUTION INTRAVENOUS at 23:38

## 2017-11-06 RX ADMIN — FENTANYL CITRATE 50 MCG: 50 INJECTION, SOLUTION INTRAMUSCULAR; INTRAVENOUS at 10:25

## 2017-11-06 RX ADMIN — FENTANYL CITRATE 50 MCG: 50 INJECTION, SOLUTION INTRAMUSCULAR; INTRAVENOUS at 08:43

## 2017-11-06 RX ADMIN — FENTANYL CITRATE 25 MCG: 50 INJECTION, SOLUTION INTRAMUSCULAR; INTRAVENOUS at 11:19

## 2017-11-06 RX ADMIN — HYDROMORPHONE HYDROCHLORIDE 0.5 MG: 1 INJECTION, SOLUTION INTRAMUSCULAR; INTRAVENOUS; SUBCUTANEOUS at 09:34

## 2017-11-06 RX ADMIN — FENTANYL CITRATE 50 MCG: 50 INJECTION, SOLUTION INTRAMUSCULAR; INTRAVENOUS at 07:35

## 2017-11-06 RX ADMIN — NEOSTIGMINE METHYLSULFATE 2.5 MG: 1 INJECTION, SOLUTION INTRAMUSCULAR; INTRAVENOUS; SUBCUTANEOUS at 10:11

## 2017-11-06 RX ADMIN — FENTANYL CITRATE 25 MCG: 50 INJECTION, SOLUTION INTRAMUSCULAR; INTRAVENOUS at 11:27

## 2017-11-06 RX ADMIN — ACETAMINOPHEN 325 MG: 160 SUSPENSION ORAL at 23:36

## 2017-11-06 RX ADMIN — HEPARIN SODIUM 5000 UNITS: 5000 INJECTION, SOLUTION INTRAVENOUS; SUBCUTANEOUS at 06:59

## 2017-11-06 RX ADMIN — METOCLOPRAMIDE 10 MG: 5 INJECTION, SOLUTION INTRAMUSCULAR; INTRAVENOUS at 23:33

## 2017-11-06 RX ADMIN — METOCLOPRAMIDE 10 MG: 5 INJECTION, SOLUTION INTRAMUSCULAR; INTRAVENOUS at 17:00

## 2017-11-06 RX ADMIN — SODIUM CHLORIDE 125 ML/HR: 0.9 INJECTION, SOLUTION INTRAVENOUS at 06:09

## 2017-11-06 RX ADMIN — SODIUM CHLORIDE: 0.9 INJECTION, SOLUTION INTRAVENOUS at 09:25

## 2017-11-06 RX ADMIN — ROCURONIUM BROMIDE 50 MG: 10 INJECTION, SOLUTION INTRAVENOUS at 07:35

## 2017-11-06 RX ADMIN — ROCURONIUM BROMIDE 10 MG: 10 INJECTION, SOLUTION INTRAVENOUS at 08:33

## 2017-11-06 RX ADMIN — DIAZEPAM 5 MG: 5 INJECTION, SOLUTION INTRAMUSCULAR; INTRAVENOUS at 16:55

## 2017-11-06 RX ADMIN — LIDOCAINE HYDROCHLORIDE 100 MG: 10 INJECTION, SOLUTION INFILTRATION; PERINEURAL at 07:35

## 2017-11-06 RX ADMIN — ONDANSETRON 4 MG: 2 INJECTION INTRAMUSCULAR; INTRAVENOUS at 10:57

## 2017-11-06 RX ADMIN — FENTANYL CITRATE 25 MCG: 50 INJECTION, SOLUTION INTRAMUSCULAR; INTRAVENOUS at 11:41

## 2017-11-06 RX ADMIN — ONDANSETRON HYDROCHLORIDE 4 MG: 2 INJECTION, SOLUTION INTRAVENOUS at 09:36

## 2017-11-06 RX ADMIN — OXYCODONE HYDROCHLORIDE 10 MG: 5 SOLUTION ORAL at 23:35

## 2017-11-06 RX ADMIN — FENTANYL CITRATE 25 MCG: 50 INJECTION, SOLUTION INTRAMUSCULAR; INTRAVENOUS at 11:12

## 2017-11-06 RX ADMIN — ROCURONIUM BROMIDE 30 MG: 10 INJECTION, SOLUTION INTRAVENOUS at 09:30

## 2017-11-06 RX ADMIN — OXYCODONE HYDROCHLORIDE 10 MG: 5 SOLUTION ORAL at 18:29

## 2017-11-06 RX ADMIN — FENTANYL CITRATE 50 MCG: 50 INJECTION, SOLUTION INTRAMUSCULAR; INTRAVENOUS at 07:27

## 2017-11-06 RX ADMIN — DEXAMETHASONE SODIUM PHOSPHATE 10 MG: 10 INJECTION INTRAMUSCULAR; INTRAVENOUS at 09:36

## 2017-11-06 RX ADMIN — PANTOPRAZOLE SODIUM 40 MG: 40 INJECTION, POWDER, FOR SOLUTION INTRAVENOUS at 12:39

## 2017-11-06 RX ADMIN — ROCURONIUM BROMIDE 10 MG: 10 INJECTION, SOLUTION INTRAVENOUS at 08:48

## 2017-11-06 RX ADMIN — ACETAMINOPHEN 325 MG: 160 SUSPENSION ORAL at 18:30

## 2017-11-06 RX ADMIN — SODIUM CHLORIDE, SODIUM LACTATE, POTASSIUM CHLORIDE, AND CALCIUM CHLORIDE 125 ML/HR: .6; .31; .03; .02 INJECTION, SOLUTION INTRAVENOUS at 20:32

## 2017-11-06 RX ADMIN — METRONIDAZOLE 500 MG: 500 INJECTION, SOLUTION INTRAVENOUS at 16:55

## 2017-11-06 RX ADMIN — GLYCOPYRROLATE 0.5 MG: 0.2 INJECTION, SOLUTION INTRAMUSCULAR; INTRAVENOUS at 10:11

## 2017-11-06 RX ADMIN — EPHEDRINE SULFATE 7.5 MG: 50 INJECTION, SOLUTION INTRAMUSCULAR; INTRAVENOUS; SUBCUTANEOUS at 08:36

## 2017-11-06 RX ADMIN — CEFAZOLIN SODIUM 2000 MG: 2 SOLUTION INTRAVENOUS at 07:31

## 2017-11-06 RX ADMIN — CEFAZOLIN SODIUM 2000 MG: 2 SOLUTION INTRAVENOUS at 15:24

## 2017-11-06 RX ADMIN — SODIUM CHLORIDE, SODIUM LACTATE, POTASSIUM CHLORIDE, AND CALCIUM CHLORIDE 125 ML/HR: .6; .31; .03; .02 INJECTION, SOLUTION INTRAVENOUS at 11:49

## 2017-11-06 RX ADMIN — MIDAZOLAM HYDROCHLORIDE 2 MG: 1 INJECTION, SOLUTION INTRAMUSCULAR; INTRAVENOUS at 07:27

## 2017-11-06 RX ADMIN — MORPHINE SULFATE 2 MG: 2 INJECTION, SOLUTION INTRAMUSCULAR; INTRAVENOUS at 15:30

## 2017-11-06 RX ADMIN — METOCLOPRAMIDE 10 MG: 5 INJECTION, SOLUTION INTRAMUSCULAR; INTRAVENOUS at 12:39

## 2017-11-06 RX ADMIN — METRONIDAZOLE 500 MG: 500 INJECTION, SOLUTION INTRAVENOUS at 07:47

## 2017-11-06 NOTE — PLAN OF CARE
DISCHARGE PLANNING     Discharge to home or other facility with appropriate resources Progressing        GASTROINTESTINAL - ADULT     Minimal or absence of nausea and/or vomiting Progressing     Maintains or returns to baseline bowel function Progressing        INFECTION - ADULT     Absence or prevention of progression during hospitalization Progressing        Knowledge Deficit     Patient/family/caregiver demonstrates understanding of disease process, treatment plan, medications, and discharge instructions Progressing        PAIN - ADULT     Verbalizes/displays adequate comfort level or baseline comfort level Progressing        SAFETY ADULT     Patient will remain free of falls Progressing     Maintain or return to baseline ADL function Progressing     Maintain or return mobility status to optimal level Progressing        SKIN/TISSUE INTEGRITY - ADULT     Skin integrity remains intact Progressing     Incision(s), wounds(s) or drain site(s) healing without S/S of infection Progressing     Oral mucous membranes remain intact Progressing

## 2017-11-06 NOTE — ANESTHESIA POSTPROCEDURE EVALUATION
Post-Op Assessment Note      CV Status:  Stable    Mental Status:  Alert and awake    Hydration Status:  Euvolemic    PONV Controlled:  Controlled    Airway Patency:  Patent    Post Op Vitals Reviewed:  Yes              /62 (11/06/17 1114)    Temp      Pulse 72 (11/06/17 1119)   Resp 14 (11/06/17 1119)    SpO2 95 % (11/06/17 1119)

## 2017-11-06 NOTE — OP NOTE
OPERATIVE REPORT  PATIENT NAME: Consuelo Gomez    :  1970  MRN: [id number]  Pt Location: [facility] OR ROOM 07    SURGERY DATE: 2017    Surgeon(s) and Role:     * Nikkie Juan MD - Primary     * Kiki Barragan MD - Assisting    Preop Diagnosis:  Morbid (severe) obesity due to excess calories [E66 01]Body mass index is 38 39 kg/m²  Hyperlipidemia [E78 5]    Post-Op Diagnosis Codes:     * Morbid (severe) obesity due to excess calories ([address] ) [E66 01]     * Hyperlipidemia [E78 5]    Procedure(s) (LRB):  BYPASS GASTRIC  MARCIN-EN-Y LAPAROSCOPIC (N/A)  ESOPHAGOGASTRODUODENOSCOPY (EGD) (N/A)  REPAIR HERNIA PARAESOPHAGEAL  LAPAROSCOPIC (N/A)    Specimen(s):  ID Type Source Tests Collected by Time Destination   1 : Hernia Sac Tissue Soft Tissue, Other TISSUE EXAM Nikkie Juan MD 2017 [id number]        Estimated Blood Loss:   Minimal    Drains:       Anesthesia Type:   General    Operative Indications: Morbid (severe) obesity due to excess calories [E66 01]  Hyperlipidemia [E78 5]      Operative Findings:  Normal Findings    Complications:   None    Procedure and Technique:  Laparoscopic Marcin en Y Gastric Bypass with intraop endoscopy  Laparoscopic PEH repair with BioA mesh   I was present for the entire procedure    Patient Disposition:  hemodynamically stable     No qualified resident available to assist  The presence of an assistant was necessary for camera holding, traction and counter traction and for help with suturing and stapling in addition to performing the intraop-EGD        Indication:   Patient suffers from morbid obesity and associated co-morbidities  and failed to achieve any meaningful or sustainable weight loss and was therefore consented to undergo a laparoscopic gastric bypass  Risks and benefits were explained to the patient, patient consented for the procedure  Description of the procedure: The patient was placed in the supine position   The patient received a dose of IV antibiotics and a dose of subcutaneous heparin prior to the procedure  The patient was induced and intubated under general endotracheal anesthesia  The abdominal wall was prepped and draped under sterile conditions in the usual fashion  After calling a time-out, we started the procedure by making an incision to the left of the midline 6 inches from the xiphoid, and the abdominal cavity was accessed using an Optiview trocar  The abdominal cavity was first insufflated with CO2 to a pressure of 15 mmHg using a Veress needle  The abdominal cavity was inspected and there was no evidence of tumors or lesions  There was no evidence of injury to the bowel or bleeding from the insertion of the Optiview trocar  At that point, two 12-mm trocars were placed under direct visualization on the right side of the abdominal wall and two other 12-mm trocars were placed on the left side of the abdominal wall, all under direct visualization  The procedure was started by lifting the omentum in a cephalad fashion  The ligament of Treitz was identified and then the small bowel was transected with an endo RAJ using a white load  The mesentery of the small bowel was then transected using a harmonic scalpel  After that, the distal small bowel was run for 150 cm in a way to obtain a Lauro limb of 150 cm in length  At that point, two enterotomies were made in the BP limb and the Lauro limb with a Harmonic scalpel, and the jejunojejunostomy was fashioned using an endo RAJ stapler with a white load  After firing the stapler, the staple line was inspected and there was no evidence of bleeding and the staple line was well formed  The common enterotomy of the jejunojejunostomy was closed in two layers using 2-0 Vicryl running suture for the first layer and  2-0 V LOC in a running fashion for the second layer  The mesenteric defect of the small bowel was approximated using nonabsorbable suture in a figure of eight fashion   At that point, a Brolin suture was placed to approximate the BP limb and the Lauro limb in a way to prevent kinking of the Lauro limb using 2-0 vicryl suture  At that point, we turned our attention to the upper portion of the abdomen  Parishdavid Seek liver retractor was placed through a stab incision in the subxiphoid space, and the left lobe of the liver was retracted medially  An OG tube was placed in the stomach to decompress the stomach and was then removed  The angle of His was dissected with a combination of sharp dissection and blunt dissection  The phrenoesophageal ligament was also dissected anteriorly and  a paraesophageal hernia was identified  The decision was then made to repair the hernia posteriorly  Right hill and left hill were dissected away from the hernia sac and skeletonized all the way down to the confluence  Esophagus was kept out of harm's way during the dissection  The dissection was completed circumferentially around the esophagus  Right hill and left hill were then approximated using 0 Ethibond sutures placed in an interrupted fashion  After approximating the crura the anesthesiologist placed a 58 Trinidadian bougie to size the hiatus the bougie was then removed and a BioA mesh was trimmed to accommodate the esophagus posteriorly and placed around the esophagus and secured to the confluence was a simple 0 Ethibond suture  Both leaflets of the mesh were secured to the diaphragm using fibrin glue  At that point, the pars flaccida was opened and a gastric pouch was created with serial firings of the endo RAJ stapler with a purple cartridge reinforced with Seamguard  After the formation of the gastric pouch, the staple lines of the pouch and the gastric remnant were inspected and appeared to be well formed and appeared to be hemostatic   At that point, a 22 OrVil was brought through the mouth and, after making a gastrotomy with electrocautery, the NG tube connected to the OrVil was brought through the pouch gastrotomy and removed from the abdominal cavity through the 12-mm trocar on the left side of the abdominal wall  However, we were unable to pass the 25 OrVil which got stuck in the upper esophagus and for that reason it was removed and replaced with a 21-mm OrVil  At that point, the anvil appeared to be well positioned in the pouch  The omentum was then split in half with the harmonic scalpel to avoid tension on the lauro limb  At that point, the end of the Lauro limb was opened with a Harmonic scalpel  The 12-mm trocar site on the right side of the abdominal wall was dilated, and the 21 circular stapler was brought through that incision into the abdominal cavity and placed inside the Lauro limb  At that point, the stapler was engaged and connected to the anvil  There was no twisting of the pouch or the Lauro limb  The stapler was fired and then removed from the abdominal cavity  The site of the mesentery of the overhang of the Lauro limb was transected with the Harmonic scalpel, and then the overhang was transected using an endo RAJ stapler with a white load  The gastrojejunostomy anastomosis was then reinforced with 2-0 Vicryl sutures placed at 3 oclock and 1 oclock to prevent any tension on the anastomosis  At that point, a bowel clamp was placed around the Lauro limb and an upper endoscopy was performed to check for hemostasis and airtightness  When the endoscopy was performed, the staple line of the pouch and the gastrojejunostomy anastomosis appeared to be hemostatic  At that point, the patient was placed in a supine position  The abdominal cavity was filled with saline and then the pouch and the Lauro limb were distended with air and, at that point, we did not visualize any air bubbles suggestive of air leak  There was evidence of a small mucosal flap in the upper esophagus most likely secondary to passing the 25 Orvil but that was felt to be of no clinical significance  The bowel clamp was then removed    The gastrojejunostomy was covered with an omental flap that was secured in place with an absorbable suture in a simple fashion  The [facility] liver retractor was removed  The right upper quadrant incision was irrigated with saline  The right upper quadrant trocar incision and the midline incision were both closed with 1-0 Vicryl in a simple fashion  Small bowel specimen was removed through the right upper quadrant trocar site using an Endo-Catch bag to avoid skin contamination  All the skin edges of the trocar sites were approximated with 4-0 Monocryl in a subcuticular inverted fashion  The patient was extubated and transferred to the PACU in stable condition         SIGNATURE: Evita Carrillo MD  DATE: November 6, 2017  TIME: 9:57 AM

## 2017-11-07 LAB
ANION GAP SERPL CALCULATED.3IONS-SCNC: 9 MMOL/L (ref 4–13)
BUN SERPL-MCNC: 8 MG/DL (ref 5–25)
CALCIUM SERPL-MCNC: 8.5 MG/DL (ref 8.3–10.1)
CHLORIDE SERPL-SCNC: 103 MMOL/L (ref 100–108)
CO2 SERPL-SCNC: 24 MMOL/L (ref 21–32)
CREAT SERPL-MCNC: 0.72 MG/DL (ref 0.6–1.3)
ERYTHROCYTE [DISTWIDTH] IN BLOOD BY AUTOMATED COUNT: 13.9 % (ref 11.6–15.1)
GFR SERPL CREATININE-BSD FRML MDRD: 101 ML/MIN/1.73SQ M
GLUCOSE SERPL-MCNC: 132 MG/DL (ref 65–140)
HCT VFR BLD AUTO: 38.9 % (ref 34.8–46.1)
HGB BLD-MCNC: 13.6 G/DL (ref 11.5–15.4)
MCH RBC QN AUTO: 31.7 PG (ref 26.8–34.3)
MCHC RBC AUTO-ENTMCNC: 35 G/DL (ref 31.4–37.4)
MCV RBC AUTO: 91 FL (ref 82–98)
PLATELET # BLD AUTO: 225 THOUSANDS/UL (ref 149–390)
PMV BLD AUTO: 10.4 FL (ref 8.9–12.7)
POTASSIUM SERPL-SCNC: 3.9 MMOL/L (ref 3.5–5.3)
RBC # BLD AUTO: 4.29 MILLION/UL (ref 3.81–5.12)
SODIUM SERPL-SCNC: 136 MMOL/L (ref 136–145)
WBC # BLD AUTO: 17.97 THOUSAND/UL (ref 4.31–10.16)

## 2017-11-07 PROCEDURE — C9113 INJ PANTOPRAZOLE SODIUM, VIA: HCPCS | Performed by: SURGERY

## 2017-11-07 PROCEDURE — 80048 BASIC METABOLIC PNL TOTAL CA: CPT | Performed by: SURGERY

## 2017-11-07 PROCEDURE — 85027 COMPLETE CBC AUTOMATED: CPT | Performed by: SURGERY

## 2017-11-07 RX ORDER — GABAPENTIN 300 MG/1
600 CAPSULE ORAL 2 TIMES DAILY
Status: DISCONTINUED | OUTPATIENT
Start: 2017-11-07 | End: 2017-11-08 | Stop reason: HOSPADM

## 2017-11-07 RX ADMIN — OXYCODONE HYDROCHLORIDE 10 MG: 5 SOLUTION ORAL at 09:10

## 2017-11-07 RX ADMIN — ACETAMINOPHEN 325 MG: 160 SUSPENSION ORAL at 20:40

## 2017-11-07 RX ADMIN — ONDANSETRON 4 MG: 2 INJECTION INTRAMUSCULAR; INTRAVENOUS at 20:41

## 2017-11-07 RX ADMIN — ACETAMINOPHEN 325 MG: 160 SUSPENSION ORAL at 09:11

## 2017-11-07 RX ADMIN — ONDANSETRON 4 MG: 2 INJECTION INTRAMUSCULAR; INTRAVENOUS at 09:10

## 2017-11-07 RX ADMIN — OXYCODONE HYDROCHLORIDE 10 MG: 5 SOLUTION ORAL at 16:33

## 2017-11-07 RX ADMIN — SODIUM CHLORIDE, SODIUM LACTATE, POTASSIUM CHLORIDE, AND CALCIUM CHLORIDE 125 ML/HR: .6; .31; .03; .02 INJECTION, SOLUTION INTRAVENOUS at 05:30

## 2017-11-07 RX ADMIN — DIAZEPAM 5 MG: 5 INJECTION, SOLUTION INTRAMUSCULAR; INTRAVENOUS at 05:27

## 2017-11-07 RX ADMIN — OXYCODONE HYDROCHLORIDE 10 MG: 5 SOLUTION ORAL at 20:40

## 2017-11-07 RX ADMIN — PANTOPRAZOLE SODIUM 40 MG: 40 INJECTION, POWDER, FOR SOLUTION INTRAVENOUS at 09:10

## 2017-11-07 RX ADMIN — METOCLOPRAMIDE 10 MG: 5 INJECTION, SOLUTION INTRAMUSCULAR; INTRAVENOUS at 05:27

## 2017-11-07 RX ADMIN — METRONIDAZOLE 500 MG: 500 INJECTION, SOLUTION INTRAVENOUS at 01:48

## 2017-11-07 RX ADMIN — DIAZEPAM 5 MG: 5 INJECTION, SOLUTION INTRAMUSCULAR; INTRAVENOUS at 16:32

## 2017-11-07 RX ADMIN — METOCLOPRAMIDE 10 MG: 5 INJECTION, SOLUTION INTRAMUSCULAR; INTRAVENOUS at 17:47

## 2017-11-07 RX ADMIN — GABAPENTIN 600 MG: 300 CAPSULE ORAL at 12:22

## 2017-11-07 RX ADMIN — ACETAMINOPHEN 325 MG: 160 SUSPENSION ORAL at 16:33

## 2017-11-07 RX ADMIN — ONDANSETRON 4 MG: 2 INJECTION INTRAMUSCULAR; INTRAVENOUS at 15:25

## 2017-11-07 RX ADMIN — METOCLOPRAMIDE 10 MG: 5 INJECTION, SOLUTION INTRAMUSCULAR; INTRAVENOUS at 12:22

## 2017-11-07 NOTE — CASE MANAGEMENT
Initial Clinical Review    Age/Sex: 56 y o  female    Surgery Date:   11/6/2017    Procedure:    Preop Diagnosis:  Morbid (severe) obesity due to excess calories [E66 01]Body mass index is 38 39 kg/m²  Hyperlipidemia [E78 5]     Post-Op Diagnosis Codes:     * Morbid (severe) obesity due to excess calories ([facility]) [E66 01]     * Hyperlipidemia [E78 5]     Procedure(s) (LRB):  BYPASS GASTRIC  MARCIN-EN-Y LAPAROSCOPIC (N/A)  ESOPHAGOGASTRODUODENOSCOPY (EGD) (N/A)  REPAIR HERNIA PARAESOPHAGEAL  LAPAROSCOPIC (N/A)       Anesthesia:    general    Admission Orders: Date/Time/Statement: 11/6/17 @ 1053     Orders Placed This Encounter   Procedures    Inpatient Admission     Standing Status:   Standing     Number of Occurrences:   1     Order Specific Question:   Admitting Physician     Answer:   TRISTAN Quintanilla [[id number]]     Order Specific Question:   Level of Care     Answer:   Med Surg [16]     Order Specific Question:   Bed Type     Answer:   Bariatric [1]     Order Specific Question:   Bed request comments     Answer:   Telemetry     Order Specific Question:   Estimated length of stay     Answer:   One Midnight       Vital Signs: /63   Pulse 86   Temp 99 2 °F (37 3 °C) (Tympanic)   Resp 20   Ht 5' 1" (1 549 m)   Wt 92 2 kg (203 lb 3 2 oz)   LMP 11/04/2017   SpO2 96%   BMI 38 39 kg/m²     Diet:        Diet Orders            Start     Ordered    11/06/17 1600  Diet Bariatric; Bariatric Clear Liquid  Diet effective now     Question Answer Comment   Diet Type Bariatric    Bariatric Bariatric Clear Liquid    RD to adjust diet per protocol?  Yes        11/06/17 1220    11/06/17 1235  Room Service  Once     Question:  Type of Service  Answer:  Room Service-Appropriate    11/06/17 1235          Mobility:    As  romaine    DVT Prophylaxis:    SCD'S    Pain Control:   Pain Medications             gabapentin (NEURONTIN) 300 mg capsule Take 600 mg by mouth 2 (two) times a day      naproxen (NAPROSYN) 500 mg tablet Take 500 mg by mouth 2 (two) times a day with meals        Tele  Bariatric cl liq  Diet  Cons  IM  IV  zofran PRN ( x1  11/7  Thus far)    [facility] in the [facility] by Giorgi Trammell for 2017  Network Utilization Review Department  Phone: [phone]; Fax [phone]  ATTENTION: The Network Utilization Review Department is now centralized for our 7 Facilities  Make a note that we have a new phone and fax numbers for our Department  Please call with any questions or concerns to [phone] and carefully follow the prompts so that you are directed to the right person  All voicemails are confidential  Fax any determinations, approvals, denials, and requests for initial or continue stay review clinical to [phone]  Due to HIGH CALL volume, it would be easier if you could please send faxed requests to expedite your requests and in part, help us provide discharge notifications faster

## 2017-11-07 NOTE — PROGRESS NOTES
Progress Note - General Surgery   Brittaney Burnett 56 y o  female MRN: [id number]  Unit/Bed#: [place] [address] 2 -02 Encounter: [id number]      Subjective/Objective     Subjective: Patient with morbid obesity s/p Procedure(s):  BYPASS GASTRIC  MARCIN-EN-Y LAPAROSCOPIC  ESOPHAGOGASTRODUODENOSCOPY (EGD)  REPAIR HERNIA PARAESOPHAGEAL  LAPAROSCOPIC POD #1   Tolerating liquid diet with some nausea, no vomiting  She complains of pain which is moderately controlled on oral pain medication, ambulating without assistance, voiding well, using incentive spirometer  Objective:    /63   Pulse 86   Temp 99 2 °F (37 3 °C) (Tympanic)   Resp 20   Ht 5' 1" (1 549 m)   Wt 92 2 kg (203 lb 3 2 oz)   LMP 11/04/2017   SpO2 96%   BMI 38 39 kg/m²       Intake/Output Summary (Last 24 hours) at 11/07/17 1130  Last data filed at 11/07/17 0537   Gross per 24 hour   Intake          2389 58 ml   Output             1545 ml   Net           844 58 ml       Invasive Devices     Peripheral Intravenous Line            Peripheral IV 11/06/17 Left Hand 1 day                  Physical Exam    General Appearance:    Alert, cooperative, no distress, appears stated age   Head:    Normocephalic, without obvious abnormality, atraumatic   Lungs:     Clear to auscultation bilaterally, respirations unlabored   Heart:    Regular rate and rhythm, S1 and S2 normal, no murmur, rub    or gallop   Abdomen:     Soft, appropriate tenderness, bowel sounds active all four quadrants, no masses, no organomegaly, non distended, incisions clean, dry, and intact   Extremities:   Extremities normal, atraumatic, no cyanosis or edema   Neurologic:   CNII-XII intact  Normal strength and sensation               Lab, Imaging and other studies:  I have personally reviewed pertinent lab results    , CBC:   Lab Results   Component Value Date    WBC 17 97 (H) 11/07/2017    HGB 13 6 11/07/2017    HCT 38 9 11/07/2017    MCV 91 11/07/2017     11/07/2017    MCH 31 7 11/07/2017 MCHC 35 0 11/07/2017    RDW 13 9 11/07/2017    MPV 10 4 11/07/2017   , CMP:   Lab Results   Component Value Date     11/07/2017    K 3 9 11/07/2017     11/07/2017    CO2 24 11/07/2017    ANIONGAP 9 11/07/2017    BUN 8 11/07/2017    CREATININE 0 72 11/07/2017    GLUCOSE 132 11/07/2017    CALCIUM 8 5 11/07/2017    EGFR 101 11/07/2017        VTE Mechanical Prophylaxis: sequential compression device    Assessment/Plan  #1  Morbid Obesity s/p Procedure(s):  BYPASS GASTRIC  MARCIN-EN-Y LAPAROSCOPIC  ESOPHAGOGASTRODUODENOSCOPY (EGD)  REPAIR HERNIA PARAESOPHAGEAL  LAPAROSCOPIC POD #1   Continue IV fluids, ambulation, pain control, anti emetics  #2  Back pain: resume neurontin capsules, open to take  #3  MDD: resume neurontin as above, she is not on additional pharmacologics for this currently  Can resume pre hospital supplements on d/c  #4  Hyperlipidemia: lifestyle modification f/u PCP  #5  GERD: Cont PPI    Plan of care was discussed with patient's nurse    Dispo: Continue liquid diet, ambulation, incentive spirometry  This text is generated with voice recognition software  There may be translation, syntax,  or grammatical errors  If you have any questions, please contact the dictating provider

## 2017-11-07 NOTE — CONSULTS
Inpatient Medical Consultation - [facility]    Patient Information: Aubrey Ash 56 y o  female MRN: [id number]  Unit/Bed#: [place] 68 2 [facility] 224-02 Encounter: [id number]  PCP: Shlomo Grant MD  Date of Admission:  11/6/2017  Date of Consultation: 11/07/17  Requesting Physician: Dallas Sandoval MD    Reason For Consultation:   Postoperative medical management    Assessment/Plan:    · Morbid obesity/BMI 38 39:  Status post elective laparoscopic Lauor-en-Y gastric bypass with EGD and paraesophageal hernia repair  Postop day 1  Management per primary team, bariatric  Counseled on need to avoid NSAIDs  · Back pain:  On gabapentin  Prior to surgery, patient was switched over to liquid formulation by her PCP  · Hyperlipidemia:  Continue to monitor with diet and lifestyle modifications  Follow up with PCP in 3 months to recheck lipid panel  · GERD:  Takes Prilosec  VTE Prophylaxis: Sequential compression device (Venodyne)   / sequential compression device       Counseling / Coordination of Care Time: 30 minutes  Greater than 50% of total time spent on patient counseling and coordination of care  History of Present Illness:    Aubrey Ash is a 56 y o  female who is originally admitted to the bariatric service on 11/6/2017 due to inability to lose meaningful in stable weight loss to conservative measures  Patient presented for a elective laparoscopic Lauro-en-Y gastric bypass  She tolerated procedure well without any complications and minimal blood loss  We are consulted for postoperative medical management  Patient has a past medical history of low back pain, hyperlipidemia, and GERD  Prior to weight loss surgery, patient had appointment with PCP who switched her from gabapentin capsules to liquid formulation  She does not take medication for her cholesterol at this point  Also takes Protonix for GERD  Patient lost 14 lbs prior to surgery    Postoperatively, she is in expected pain which is controlled with pain medication  Complains of increased nausea however no vomiting  Able to tolerate liquid diet  Ambulating without difficulty  Notes she is belching quite a lot and had the hiccups last night  Denies passing any gas thus far  Denies any shortness of breath, chest pain or pressure, palpitations  Nonsmoker, nondrinker, no drug use  Review of Systems:    Review of Systems   Constitutional: Negative for chills and fever  HENT: Negative for congestion  Respiratory: Negative for cough, chest tightness and shortness of breath  Cardiovascular: Negative for chest pain, palpitations and leg swelling  Gastrointestinal: Positive for abdominal pain and nausea  Negative for diarrhea and vomiting  Genitourinary: Negative for dysuria  Musculoskeletal: Positive for back pain  Chronic back pain   Skin: Negative for pallor and rash  Neurological: Negative for dizziness and light-headedness  Psychiatric/Behavioral: Negative for agitation  Past Medical and Surgical History:     Past Medical History:   Diagnosis Date    Achilles tendinitis     right     Back pain     Depression     GERD (gastroesophageal reflux disease)     Hyperlipidemia     Multiple thyroid nodules     Plantar fasciitis     bilateral    Varicose vein of leg        Past Surgical History:   Procedure Laterality Date    CARPAL TUNNEL RELEASE       SECTION      x3    ESOPHAGOGASTRODUODENOSCOPY N/A 2017    Procedure: ESOPHAGOGASTRODUODENOSCOPY (EGD);   Surgeon: Barrett Atkinson MD;  [facility];  Service: Bariatrics    PARAESOPHAGEAL HERNIA REPAIR N/A 2017    Procedure: REPAIR HERNIA PARAESOPHAGEAL  LAPAROSCOPIC;  Surgeon: Barrett Atkinson MD;  [facility];  Service: Hernandes Waterford MS EGD TRANSORAL BIOPSY SINGLE/MULTIPLE N/A 2017    Procedure: ESOPHAGOGASTRODUODENOSCOPY (EGD) with biopsy;  Surgeon: Barrett Atkinson MD;  Location: [facility]; Service: Bariatrics    DC LAP GASTRIC BYPASS/MARCIN-EN-Y N/A 11/6/2017    Procedure: BYPASS GASTRIC  MARCIN-EN-Y LAPAROSCOPIC;  Surgeon: Bibiana Torres MD;  [facility];  Service: Bariatrics    DC WRIST Chalice Pastel LIG Right 1/27/2017    Procedure: ENDOSCOPIC CARPAL TUNNEL RELEASE ;  Surgeon: Everette Luciano MD;  [facility];  Service: Orthopedics    DC WRIST Chalice Pastel LIG Left 6/16/2017    Procedure: ENDOSCOPIC CARPAL TUNNEL RELEASE;  Surgeon: Everette Luciano MD;  [facility];  Service: Orthopedics    TONSILLECTOMY      WISDOM TOOTH EXTRACTION         Meds/Allergies:    all medications and allergies reviewed    Allergies: Allergies   Allergen Reactions    Latex Itching       Social History:     Marital Status: Single    Substance Use History:   History   Alcohol Use No     History   Smoking Status    Former Smoker    Quit date: 1/27/2014   Smokeless Tobacco    Never Used     History   Drug Use No       Family History:    non-contributory    Physical Exam:     Vitals:   Blood Pressure: 121/63 (11/07/17 0722)  Pulse: 86 (11/07/17 0722)  Temperature: 99 2 °F (37 3 °C) (11/07/17 0722)  Temp Source: Tympanic (11/07/17 0722)  Respirations: 20 (11/07/17 0722)  Height: 5' 1" (154 9 cm) (11/06/17 0555)  Weight - Scale: 92 2 kg (203 lb 3 2 oz) (11/06/17 0555)  SpO2: 96 % (11/07/17 0722)    Physical Exam   Constitutional: She is oriented to person, place, and time  She appears well-developed and well-nourished  No distress  HENT:   Head: Normocephalic and atraumatic  Eyes: Conjunctivae are normal    Cardiovascular: Normal rate, regular rhythm and normal heart sounds  Pulmonary/Chest: Effort normal and breath sounds normal  No respiratory distress  She has no wheezes  She has no rales  She exhibits no tenderness  Abdominal: Soft  Bowel sounds are normal  She exhibits no distension and no mass  There is tenderness  There is no rebound and no guarding  Abdominal incisions clean dry and intact   Musculoskeletal: She exhibits no edema  Neurological: She is alert and oriented to person, place, and time  Skin: Skin is warm and dry  She is not diaphoretic  Psychiatric: She has a normal mood and affect  Her behavior is normal  Judgment and thought content normal    Nursing note and vitals reviewed  Additional Data:     Lab Results: I have personally reviewed pertinent reports  Results from last 7 days  Lab Units 11/07/17  0523   WBC Thousand/uL 17 97*   HEMOGLOBIN g/dL 13 6   HEMATOCRIT % 38 9   PLATELETS Thousands/uL 225       Results from last 7 days  Lab Units 11/07/17  0523   SODIUM mmol/L 136   POTASSIUM mmol/L 3 9   CHLORIDE mmol/L 103   CO2 mmol/L 24   BUN mg/dL 8   CREATININE mg/dL 0 72   CALCIUM mg/dL 8 5   GLUCOSE RANDOM mg/dL 132           Imaging: I have personally reviewed pertinent reports  No results found  ** Please Note: This note has been constructed using a voice recognition system   **

## 2017-11-08 VITALS
DIASTOLIC BLOOD PRESSURE: 72 MMHG | TEMPERATURE: 98.8 F | OXYGEN SATURATION: 95 % | WEIGHT: 203.2 LBS | SYSTOLIC BLOOD PRESSURE: 135 MMHG | BODY MASS INDEX: 38.36 KG/M2 | RESPIRATION RATE: 18 BRPM | HEIGHT: 61 IN | HEART RATE: 87 BPM

## 2017-11-08 PROCEDURE — C9113 INJ PANTOPRAZOLE SODIUM, VIA: HCPCS | Performed by: SURGERY

## 2017-11-08 RX ORDER — OMEPRAZOLE 20 MG/1
20 CAPSULE, DELAYED RELEASE ORAL DAILY
Qty: 30 CAPSULE | Refills: 6
Start: 2017-11-08 | End: 2018-05-18 | Stop reason: SDUPTHER

## 2017-11-08 RX ORDER — OXYCODONE HYDROCHLORIDE AND ACETAMINOPHEN 5; 325 MG/1; MG/1
1 TABLET ORAL EVERY 4 HOURS PRN
Qty: 20 TABLET | Refills: 0
Start: 2017-11-08 | End: 2017-11-18

## 2017-11-08 RX ADMIN — PANTOPRAZOLE SODIUM 40 MG: 40 INJECTION, POWDER, FOR SOLUTION INTRAVENOUS at 08:51

## 2017-11-08 RX ADMIN — METOCLOPRAMIDE 10 MG: 5 INJECTION, SOLUTION INTRAMUSCULAR; INTRAVENOUS at 00:27

## 2017-11-08 RX ADMIN — DIAZEPAM 5 MG: 5 INJECTION, SOLUTION INTRAMUSCULAR; INTRAVENOUS at 04:27

## 2017-11-08 RX ADMIN — METOCLOPRAMIDE 10 MG: 5 INJECTION, SOLUTION INTRAMUSCULAR; INTRAVENOUS at 06:22

## 2017-11-08 RX ADMIN — ACETAMINOPHEN 325 MG: 160 SUSPENSION ORAL at 08:50

## 2017-11-08 RX ADMIN — OXYCODONE HYDROCHLORIDE 10 MG: 5 SOLUTION ORAL at 02:18

## 2017-11-08 RX ADMIN — ONDANSETRON 4 MG: 2 INJECTION INTRAMUSCULAR; INTRAVENOUS at 02:18

## 2017-11-08 RX ADMIN — SODIUM CHLORIDE, SODIUM LACTATE, POTASSIUM CHLORIDE, AND CALCIUM CHLORIDE 125 ML/HR: .6; .31; .03; .02 INJECTION, SOLUTION INTRAVENOUS at 06:22

## 2017-11-08 RX ADMIN — OXYCODONE HYDROCHLORIDE 10 MG: 5 SOLUTION ORAL at 08:50

## 2017-11-08 RX ADMIN — METOCLOPRAMIDE 10 MG: 5 INJECTION, SOLUTION INTRAMUSCULAR; INTRAVENOUS at 11:27

## 2017-11-08 NOTE — PLAN OF CARE
DISCHARGE PLANNING     Discharge to home or other facility with appropriate resources Progressing        GASTROINTESTINAL - ADULT     Minimal or absence of nausea and/or vomiting Progressing     Maintains or returns to baseline bowel function Progressing        INFECTION - ADULT     Absence or prevention of progression during hospitalization Progressing        Knowledge Deficit     Patient/family/caregiver demonstrates understanding of disease process, treatment plan, medications, and discharge instructions Progressing        PAIN - ADULT     Verbalizes/displays adequate comfort level or baseline comfort level Progressing        Potential for Falls     Patient will remain free of falls Progressing        SAFETY ADULT     Patient will remain free of falls Progressing     Maintain or return to baseline ADL function Progressing     Maintain or return mobility status to optimal level Progressing        SKIN/TISSUE INTEGRITY - ADULT     Skin integrity remains intact Progressing     Incision(s), wounds(s) or drain site(s) healing without S/S of infection Progressing     Oral mucous membranes remain intact Progressing

## 2017-11-08 NOTE — PROGRESS NOTES
Progress Note - Janet Ernst 56 y o  female MRN: [id number]    Unit/Bed#: [place] [address] 2 [address] 224-02 Encounter: [id number]      Subjective: The patient feels significantly better today  Her pain is less  Her nausea has resolved  She is tolerating liquids well  She has been walking without difficulty  She has no chest pain or shortness of breath  Physical Exam:   Temp:  [98 8 °F (37 1 °C)-99 6 °F (37 6 °C)] 98 8 °F (37 1 °C)  HR:  [74-87] 87  Resp:  [18] 18  BP: (125-135)/(62-72) 135/72    Gen:  Well-developed, obese, in no distress  Neck:  Supple  No lymphadenopathy, goiter, or bruit  Heart:  Regular rhythm  No murmur, gallop, or rub  Lungs:  Clear to auscultation and percussion  No wheezing, rales, or rhonchi    Abd:  Soft with active bowel sounds  No unusual tenderness is present  There is no mass or organomegaly  Extremities:  No clubbing, cyanosis, or edema  No calf tenderness  Neuro:  Alert and oriented  No focal sign  Skin:  Warm and dry      LABS:  No new labs        Patient Active Problem List   Diagnosis    Morbid obesity ([address] )    Depression    GERD (gastroesophageal reflux disease)    Hyperlipidemia       Assessment/Plan:  1  Obesity, status post gastric bypass  2  Hyperlipidemia  3  Gastroesophageal reflux  4  Depression    The patient is doing quite well  She will be discharged later today  Her medications were reviewed

## 2017-11-08 NOTE — NURSING NOTE
All discharge instructions were reviewed with the patient  All questions were answered  Patient took all belongings with upon discharge

## 2017-11-08 NOTE — DISCHARGE SUMMARY
Discharge Summary - Emil Lee 56 y o  female MRN: [id number]    [facility] [place] [facility] Encounter: [id number]    Admission Date: 11/6/2017     Discharge Date: 11/08/17    Admitting Diagnosis: Morbid (severe) obesity due to excess calories ([facility] [address] ) [E66 01]  Hyperlipidemia [E78 5]    Secondary Diagnosis:   Past Medical History:   Diagnosis Date    Achilles tendinitis     right     Back pain     Depression     GERD (gastroesophageal reflux disease)     Hyperlipidemia     Multiple thyroid nodules     Plantar fasciitis     bilateral    Varicose vein of leg        Discharge Diagnosis: Same    Procedures Performed: Procedure(s):  BYPASS GASTRIC  MARCIN-EN-Y LAPAROSCOPIC  ESOPHAGOGASTRODUODENOSCOPY (EGD)  REPAIR HERNIA PARAESOPHAGEAL  LAPAROSCOPIC    Consults: Internal Medicine     Hospital Course: Patient with morbid obesity was admitted to the hospital on 11/6/2017 for elective Procedure(s):  BYPASS GASTRIC  MARCIN-EN-Y LAPAROSCOPIC  ESOPHAGOGASTRODUODENOSCOPY (EGD)  REPAIR HERNIA PARAESOPHAGEAL  LAPAROSCOPIC  Postoperatively, the patient was stable and transferred to the [address] for further observation  Postop day one, the patient was tolerating a liquid diet and pain was controlled oral pain medications  Patient stayed in hospital on POD#1 due to post operative nausea  thsi was relieved with reglan  On POD#2 she was tolerating her clear liquid diet The patient was ambulating without assistance, vital signs and lab work were stable  The patient was cleared for discharge on 11/08/17  Disposition: Home   Call physician for temperature over 101, wound redness or discharge, vomiting, or intolerance to diet  Discharge Medications:  See after visit summary for reconciled discharge medications provided to patient and family  This text is generated with voice recognition software  There may be translation, syntax,  or grammatical errors   If you have any questions, please contact the dictating provider

## 2017-11-08 NOTE — PLAN OF CARE
Problem: PAIN - ADULT  Goal: Verbalizes/displays adequate comfort level or baseline comfort level  Interventions:  - Encourage patient to monitor pain and request assistance  - Assess pain using appropriate pain scale  - Administer analgesics based on type and severity of pain and evaluate response  - Implement non-pharmacological measures as appropriate and evaluate response  - Consider cultural and social influences on pain and pain management  - Notify physician/advanced practitioner if interventions unsuccessful or patient reports new pain   Outcome: Progressing      Problem: INFECTION - ADULT  Goal: Absence or prevention of progression during hospitalization  INTERVENTIONS:  - Assess and monitor for signs and symptoms of infection  - Monitor lab/diagnostic results  - Monitor all insertion sites, i e  indwelling lines, tubes, and drains  - Monitor endotracheal (as able) and nasal secretions for changes in amount and color  - [place] appropriate cooling/warming therapies per order  - Administer medications as ordered  - Instruct and encourage patient and family to use good hand hygiene technique  - Identify and instruct in appropriate isolation precautions for identified infection/condition   Outcome: Progressing      Problem: SAFETY ADULT  Goal: Patient will remain free of falls  INTERVENTIONS:  - Assess patient frequently for physical needs  -  Identify cognitive and physical deficits and behaviors that affect risk of falls    -  [place] fall precautions as indicated by assessment   - Educate patient/family on patient safety including physical limitations  - Instruct patient to call for assistance with activity based on assessment  - Modify environment to reduce risk of injury  - Consider OT/PT consult to assist with strengthening/mobility    Outcome: Progressing    Goal: Maintain or return to baseline ADL function  INTERVENTIONS:  -  Assess patient's ability to carry out ADLs; assess patient's baseline for ADL function and identify physical deficits which impact ability to perform ADLs (bathing, care of mouth/teeth, toileting, grooming, dressing, etc )  - Assess/evaluate cause of self-care deficits   - Assess range of motion  - Assess patient's mobility; develop plan if impaired  - Assess patient's need for assistive devices and provide as appropriate  - Encourage maximum independence but intervene and supervise when necessary  ¯ Involve family in performance of ADLs  ¯ Assess for home care needs following discharge   ¯ Request OT consult to assist with ADL evaluation and planning for discharge  ¯ Provide patient education as appropriate   Outcome: Progressing    Goal: Maintain or return mobility status to optimal level  INTERVENTIONS:  - Assess patient's baseline mobility status (ambulation, transfers, stairs, etc )    - Identify cognitive and physical deficits and behaviors that affect mobility  - Identify mobility aids required to assist with transfers and/or ambulation (gait belt, sit-to-stand, lift, walker, cane, etc )  - [place] fall precautions as indicated by assessment  - Record patient progress and toleration of activity level on Mobility SBAR; progress patient to next Phase/Stage  - Instruct patient to call for assistance with activity based on assessment  - Request Rehabilitation consult to assist with strengthening/weightbearing, etc    Outcome: Progressing      Problem: DISCHARGE PLANNING  Goal: Discharge to home or other facility with appropriate resources  INTERVENTIONS:  - Identify barriers to discharge w/patient and caregiver  - Arrange for needed discharge resources and transportation as appropriate  - Identify discharge learning needs (meds, wound care, etc )  - Arrange for interpretive services to assist at discharge as needed  - Refer to Case Management Department for coordinating discharge planning if the patient needs post-hospital services based on physician/advanced practitioner order or complex needs related to functional status, cognitive ability, or social support system   Outcome: Progressing      Problem: Knowledge Deficit  Goal: Patient/family/caregiver demonstrates understanding of disease process, treatment plan, medications, and discharge instructions  Complete learning assessment and assess knowledge base    Interventions:  - Provide teaching at level of understanding  - Provide teaching via preferred learning methods   Outcome: Progressing      Problem: GASTROINTESTINAL - ADULT  Goal: Minimal or absence of nausea and/or vomiting  INTERVENTIONS:  - Administer IV fluids as ordered to ensure adequate hydration  - Maintain NPO status until nausea and vomiting are resolved  - Nasogastric tube as ordered  - Administer ordered antiemetic medications as needed  - Provide nonpharmacologic comfort measures as appropriate  - Advance diet as tolerated, if ordered  - Nutrition services referral to assist patient with adequate nutrition and appropriate food choices   Outcome: Progressing    Goal: Maintains or returns to baseline bowel function  INTERVENTIONS:  - Assess bowel function  - Encourage oral fluids to ensure adequate hydration  - Administer IV fluids as ordered to ensure adequate hydration  - Administer ordered medications as needed  - Encourage mobilization and activity  - Nutrition services referral to assist patient with appropriate food choices   Outcome: Progressing      Problem: SKIN/TISSUE INTEGRITY - ADULT  Goal: Skin integrity remains intact  INTERVENTIONS  - Identify patients at risk for skin breakdown  - Assess and monitor skin integrity  - Assess and monitor nutrition and hydration status  - Monitor labs (i e  albumin)  - Assess for incontinence   - Turn and reposition patient  - Assist with mobility/ambulation  - Relieve pressure over bony prominences  - Avoid friction and shearing  - Provide appropriate hygiene as needed including keeping skin clean and dry  - Evaluate need for skin moisturizer/barrier cream  - Collaborate with interdisciplinary team (i e  Nutrition, Rehabilitation, etc )   - Patient/family teaching   Outcome: Progressing    Goal: Incision(s), wounds(s) or drain site(s) healing without S/S of infection  INTERVENTIONS  - Assess and document risk factors for skin impairment   - Assess and document dressing, incision, wound bed, drain sites and surrounding tissue  - Initiate Nutrition services consult and/or wound management as needed   Outcome: Progressing    Goal: Oral mucous membranes remain intact  INTERVENTIONS  - Assess oral mucosa and hygiene practices  - Implement preventative oral hygiene regimen  - Implement oral medicated treatments as ordered  - Initiate Nutrition services referral as needed   Outcome: Progressing      Problem: Potential for Falls  Goal: Patient will remain free of falls  INTERVENTIONS:  - Assess patient frequently for physical needs  -  Identify cognitive and physical deficits and behaviors that affect risk of falls    -  [place] fall precautions as indicated by assessment   - Educate patient/family on patient safety including physical limitations  - Instruct patient to call for assistance with activity based on assessment  - Modify environment to reduce risk of injury  - Consider OT/PT consult to assist with strengthening/mobility    Outcome: Progressing

## 2017-11-08 NOTE — DISCHARGE INSTRUCTIONS
Bariatric/Weight Loss Surgery  Hospital Discharge Instructions  1  ACTIVITY:  a  Progress as feels comfortable - a good rule is:  if you are doing something and it begins to hurt, stop doing the activity  Walk at least 3 times per day at home  b  Phu Medrano may walk stairs if you do so slowly  c  You may shower 48 hours after surgery  d  Use your incentive spirometer 10 times per hour while awake for 1 week  e  No driving if you are still taking certain prescription pain medication  Examples of such medication are Percocet, Darvocet, Oxycodone, Tylenol #3, and Tylenol with Codeine  Follow your pharmacists orders  2  DIET  a  Stay on a liquid diet for 7 days after your surgery date, sipping slowly  Refer to your manual for examples of choices  Remember to keep your liquids sugar free or low calorie  You may have protein drinks  Make sure to drink 48 to 64 ounces per day of fluids  b  On the 8th day after surgery, start a pureed/blenderized diet  (As an example, if you had surgery on a Monday, you can start your pureed/blenderized diet the following Monday)  Start 3 meals per day, breakfast, lunch and dinner, each meal to consist of 30 minutes without drinking, 20 minutes eating food and then another 60 minutes without drinking  Follow the directions in your manual under Diet Progression, section 3  You will be on a pureed/blenderized diet for 3 to 5 days  c  Once you start the pureed/blenderized diet, remember to keep hydrated by drinking water or low calorie liquids between meal times (48 to 64 ounces per day) following the 30/60 minute rule  d  Phu Medrano may start a soft diet once you get approval from your surgeon at your first follow up visit  3  MEDICATIONS:  a  Start vitamins and minerals when you get home  b  Pain and Anti-acid Medication as per prescription  c  Other medications as indicated on the Physician Patient Discharge Instructions form given to you at the time of discharge    d  Make sure that you are splitting your pill or tablet medications in halves or fourths or even crushing them before you take them  Capsules should be opened and mixed with water or jello  You need to do this for at least 2 to 4 weeks after surgery  Eventually you will be able to take your medications the regular way as they were prescribed  Ask your nurse prior to discharge about your medications  hannah  Socorro Valle will need to consult with your Family Doctor in regards to all your prescribed medication, particularly those for blood pressure and diabetes  As you lose weight, medical conditions may change, requiring an alteration or elimination of the drug dose  4  INCISION CARE  a  You may shower and get incisions wet 2 days after surgery  b  If you have a drain, empty the drain as the nurses instructed  5  FOLLOW-UP APPOINTMENT should be made for one week after discharge  Call surgeons office at [phone] to schedule an appointment      6  CALL YOUR DOCTOR FOR:  pain not controlled by pain medications, a temperature greater than 101 5° F, any increase or change in drainage or redness from any incision, any vomiting or inability to keep liquids down, shortness of breath, shoulder pain, or bleeding

## 2017-11-10 ENCOUNTER — GENERIC CONVERSION - ENCOUNTER (OUTPATIENT)
Dept: OTHER | Facility: OTHER | Age: 47
End: 2017-11-10

## 2017-11-13 ENCOUNTER — GENERIC CONVERSION - ENCOUNTER (OUTPATIENT)
Dept: OTHER | Facility: OTHER | Age: 47
End: 2017-11-13

## 2017-11-14 ENCOUNTER — ALLSCRIPTS OFFICE VISIT (OUTPATIENT)
Dept: OTHER | Facility: OTHER | Age: 47
End: 2017-11-14

## 2017-11-16 ENCOUNTER — ALLSCRIPTS OFFICE VISIT (OUTPATIENT)
Dept: OTHER | Facility: OTHER | Age: 47
End: 2017-11-16

## 2017-11-17 NOTE — PROGRESS NOTES
Assessment    1  Gastric bypass status for obesity (V45 86) (Z98 84)    Plan  Morbid obesity    · Metoclopramide HCl - 5 MG Oral Tablet (Reglan); TAKE 1 TABLET 4 TIMES DAILYAS NEED FOR NAUSEA   Rx By: Abebe Ram; Dispense: 5 Days ; #:20 Tablet; Refill: 0;Morbid obesity; ZELALEM = N; Verified Transmission to [id number] Last Updated By: System, [employer]; 11/16/2017 10:54:08 AM   · Ondansetron 4 MG Oral Tablet Disintegrating (Zofran ODT); Take one tablet every 6hours as needed for nausea   Rx By: Abebe Ram; Dispense: 0 Days ; #:10 Tablet Disintegrating; Refill: 0;Morbid obesity; ZELALEM = N; Verified Transmission to [id number] Last Updated By: System, [employer]; 11/16/2017 10:54:07 AM    Discussion/Summary    Patient is presenting for the first postoperative visit, patient hospital stay was uneventful without any complications, patient is doing well, has no complaints, is taking vitamins as instructed, currently tolerating the blenderized diet, will advance to soft diet  Patient will also be meeting with our dietician today to review her vitamin and mineral supplements and also go over her diet and emphasize postoperative commitment and compliance   The patient was also instructed to start exercising on a regular basis  However, I recommended no heavy lifting, or weight exercises for another 2 weeks  F/U in 4 weeks  Patient was instructed to call if develops nausea, vomiting, fever or chills  Chief Complaint  Patient in office today for 1st post op [facility] visit  She is tolerating diet, no complaints  Post-Op  Post-Op Bariatric Surgery:  Catherine Darby is status post laparoscopic Lauro-en-Y procedure  HPI: The patient reports no dyspnea,-- no fever,-- no constipation,-- no diarrhea-- and-- not experiencing dumping syndrome  Diet and Exercise: Diet history reviewed and discussed with the patient  Weight loss/gains to date discussed with the patient  She reports eating 3 meals per day  PE:   Assessment:  Post-op, the patient is doing well-- and-- has excellent pain control  Plan:  Instructions / Recommendations: dietary counseling recommended,-- recommended a daily protein intake of  grams,-- vitamin supplement(s) recommended-- and-- mineral supplement(s) recommended  The patient was instructed to follow up in 4 weeks  Review of Systems   Constitutional: No fever, no chills, feels well, no tiredness, no recent weight gain or weight loss  Eyes: No complaints of eye pain, no red eyes, no eyesight problems, no discharge, no dry eyes, no itching of eyes  ENT: no complaints of earache, no loss of hearing, no nose bleeds, no nasal discharge, no sore throat, no hoarseness  Cardiovascular: No complaints of slow heart rate, no fast heart rate, no chest pain, no palpitations, no leg claudication, no lower extremity edema  Respiratory: No complaints of shortness of breath, no wheezing, no cough, no SOB on exertion, no orthopnea, no PND  Gastrointestinal: No complaints of abdominal pain, no constipation, no nausea or vomiting, no diarrhea, no bloody stools  Genitourinary: No complaints of dysuria, no incontinence, no pelvic pain, no dysmenorrhea, no vaginal discharge or bleeding  Musculoskeletal: No complaints of arthralgias, no myalgias, no joint swelling or stiffness, no limb pain or swelling  Integumentary: No complaints of skin rash or lesions, no itching, no skin wounds, no breast pain or lump  Neurological: No complaints of headache, no confusion, no convulsions, no numbness, no dizziness or fainting, no tingling, no limb weakness, no difficulty walking  Psychiatric: Not suicidal, no sleep disturbance, no anxiety or depression, no change in personality, no emotional problems  Endocrine: No complaints of proptosis, no hot flashes, no muscle weakness, no deepening of the voice, no feelings of weakness    Hematologic/Lymphatic: No complaints of swollen glands, no swollen glands in the neck, does not bleed easily, does not bruise easily  Active Problems  1  Ankle pain (719 47) (M25 579)   2  Asymptomatic varicose veins, unspecified laterality (454 9) (I83 90)   3  Bilateral carpal tunnel syndrome (354 0) (G56 03)   4  Bradycardia, pre-operative cardiovascular examination (E67 02,227  89) (Z01 810,R00 1)   5  Bullae (709 8) (R23 8)   6  Carpal tunnel syndrome on left (354 0) (G56 02)   7  Carpal tunnel syndrome on right (354 0) (G56 01)   8  Dyshidrotic eczema (705 81) (L30 1)   9  Dyslipidemia (272 4) (E78 5)   10  Eczema (692 9) (L30 9)   11  Exposure to potentially hazardous body fluids (V15 85) (Z77 21)   12  Gastric bypass status for obesity (V45 86) (Z98 84)   13  Generalized obesity (278 00) (E66 9)   14  Herpes zoster (053 9) (B02 9)   15  History of thyroid nodule (V12 29) (Z86 39)   16  Influenza vaccine needed (V04 81) (Z23)   17  Large breasts (611 1) (N62)   18  Midline thoracic back pain (724 1) (M54 6)   19  Morbid obesity (278 01) (E66 01)   20  Nausea (787 02) (R11 0)   21  Oligomenorrhea (626 1) (N91 5)   22  Onychomycosis (110 1) (B35 1)   23  Paresthesia of arm (782 0) (R20 2)   24  Perimenopause (627 2) (N95 1)   25  Plantar fasciitis (728 71) (M72 2)   26  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)   27  Postgastrectomy malabsorption (579 3) (K91 2,Z90 3)   28  Preoperative cardiovascular examination (V72 81) (Z01 810)   29  Pre-operative laboratory examination (V72 63) (Z01 812)   30  Screening for breast cancer (V76 10) (Z12 31)   31  Seborrheic keratosis (702 19) (L82 1)   32  Segmental and somatic dysfunction of cervical region (739 1) (M99 01)   33  Somatic dysfunction of spine, thoracic (739 2) (M99 02)   34  Tendonitis, Achilles (726 71) (M76 60)   35  Tendonitis, Achilles (726 71) (M76 60)   36  Thoracic myofascial strain (847 1) (S29 019A)   37  Tingling (782 0) (R20 2)   38   Trigger middle finger of right hand (727 03) (M65 331)    Social History · History of Being A Social Drinker   · Former smoker (G40 32) (W98 525)   · No alcohol use   · No drug use   · Occupation:   · 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[place] [address]   1  Acetaminophen Extra Strength 500 MG/15ML Oral Liquid; SWALLOW 20 ML Every 8 hours PRN; Therapy: 72CHW6529 to (Evaluate:34Zqz8884)  Requested for: 31Oct2017; Last Rx:31Oct2017 Ordered   2  Calcium 600 MG Oral Tablet; TAKE 1 TABLET 3 times daily; Therapy: 13YDH7029 to Recorded   3  Cyanocobalamin Powder; Therapy: 50JLI2252 to Recorded   4  Daily Multiple Vitamins Oral Tablet; Therapy: (Recorded:22Ykf3019) to Recorded   5  Gabapentin 300 MG/6ML Oral Solution; 8mL po q AM and 8mL po qhs for 1 week and then 4mLpo q AM and 4mL po qhs for 1 week, then stop medication; Therapy: 06MNH6576 to (Eugenio Hugo)  Requested for: 11AOR0914; Last Rx:14Nov2017 Ordered   6  Melatonin 10 MG Oral Tablet; TAKE 1 TABLET Bedtime; Therapy: 02NKQ0091 to Recorded   7  Omeprazole 20 MG Oral Capsule Delayed Release; TAKE 1 CAPSULE DAILY EVERY MORNING BEFORE BREAKFAST; Therapy: 69Txi2527 to (Evaluate:07Oct2018); Last Rx:12Oct2017 Ordered   8  Turmeric 500 MG Oral Capsule; TAKE 2 CAPSULE Twice daily; Therapy: (Recorded:01Yie0880) to Recorded    Allergies  1  No Known Drug Allergies    Vitals   Recorded: 48THD4027 10:44AM   Temperature 98 2 F   Heart Rate 70   Respiration 18   Systolic 292   Diastolic 64   Height 5 ft 1 in   Weight 193 lb 8 0 oz   BMI Calculated 36 56   BSA Calculated 1 86       Physical Exam   Constitutional  General appearance: No acute distress, well appearing and well nourished  Abdomen  Abdomen: Non-tender, no masses  -- First degree burn from the tape  Liver and spleen: No hepatomegaly or splenomegaly           Future Appointments    Date/Time Provider Specialty Site   05/17/2018 01:00 PM ADELAIDA Bond   12/26/2017 10:40 AM Specialty Clinic, Podiatry  [address] [place] [facility]   12/05/2017 01:20 PM Mary Correa DO Family Medicine [address]       Signatures   Electronically signed by :  ADELAIDA Peck ; Nov 16 2017 10:55AM EST                       (Author)

## 2017-11-20 ENCOUNTER — GENERIC CONVERSION - ENCOUNTER (OUTPATIENT)
Dept: OTHER | Facility: OTHER | Age: 47
End: 2017-11-20

## 2017-12-20 ENCOUNTER — GENERIC CONVERSION - ENCOUNTER (OUTPATIENT)
Dept: OTHER | Facility: OTHER | Age: 47
End: 2017-12-20

## 2017-12-26 ENCOUNTER — ALLSCRIPTS OFFICE VISIT (OUTPATIENT)
Dept: OTHER | Facility: OTHER | Age: 47
End: 2017-12-26

## 2017-12-27 NOTE — CONSULTS
Active Problems    1  Ankle pain (719 47) (M25 579)   2  Asymptomatic varicose veins, unspecified laterality (454 9) (I83 90)   3  Bradycardia, pre-operative cardiovascular examination (N57 48,255  89) (Z01 810,R00 1)   4  Carpal tunnel syndrome on left (354 0) (G56 02)   5  Carpal tunnel syndrome on right (354 0) (G56 01)   6  Dyshidrotic eczema (705 81) (L30 1)   7  Eczema (692 9) (L30 9)   8  Exposure to potentially hazardous body fluids (V15 85) (Z77 21)   9  Herpes zoster (053 9) (B02 9)   10  History of thyroid nodule (V12 29) (Z86 39)   11  Large breasts (611 1) (N62)   12  Oligomenorrhea (626 1) (N91 5)   13  Paresthesia of arm (782 0) (R20 2)   14  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)   15  Preoperative cardiovascular examination (V72 81) (Z01 810)   16  Pre-operative laboratory examination (V72 63) (Z01 812)   17  Screening for breast cancer (V76 10) (Z12 31)   18  Seborrheic keratosis (702 19) (L82 1)   19  Segmental and somatic dysfunction of cervical region (739 1) (M99 01)   20  Somatic dysfunction of spine, thoracic (739 2) (M99 02)   21  Tendonitis, Achilles (726 71) (M76 60)   22  Thoracic myofascial strain (847 1) (S29 019A)   23  Tingling (782 0) (R20 2)   24  Trigger middle finger of right hand (727 03) (M65 331)      Midline thoracic back pain (724 1) (M54 6)             Generalized obesity (278 00) (E66 9)             Bilateral carpal tunnel syndrome (354 0) (G56 03)             Morbid obesity (278 01) (E66 01)             Dyslipidemia (272 4) (E78 5)               Past Medical History   1  Acute conjunctivitis (372 00) (H10 30)   2  Acute frontal sinusitis (461 1) (J01 10)   3  History of Bacterial vaginosis (616 10,041 9) (N76 0,B96 89)   4  History of Depression (311) (F32 9)   5  History of Encounter for consultation (V65 9) (Z71 9)   6  History of Encounter for screening mammogram for breast cancer (V76 12) (Z12 31)   7  History of Generalized obesity (278 00) (E66 9)   8  History of Heel pain (729 5) (M79 673)   9  History of acute sinusitis (V12 69) (Z87 09)   10  History of carpal tunnel syndrome (V12 49) (Z86 69)   11  History of viral warts (V12 09) (Z86 19)   12  History of Influenza vaccine needed (V04 81) (Z23)   13  History of Itching in the vaginal area (698 1) (L29 8)   14  History of Pain, upper back (724 5) (M54 9)   15  History of Palpitations (785 1) (R00 2)   16  History of Positive PPD (795 51) (R76 11)   17  History of Tendonitis, Achilles (726 71) (M76 60)   18  History of Visit for routine gyn exam (V72 31) (Z01 419)    Surgical History   1  History of  Section   2  History of Neuroplasty Decompression Median Nerve At Carpal Tunnel   3  History of Nose Surgery   4  History of Tonsillectomy   5  History of Tubal Ligation    Family History   Mother    1  Family history of Diabetes Mellitus (V18 0)   2  Family history of Heart Disease (V17 49)   3  Family history of Stroke Syndrome (V17 1)  Father    4  Family history of Heart Disease (V17 49)   5  Family history of Hypertension (V17 49)   6  Family history of Renal Disease  Maternal Grandmother    7  Family history of Stroke Syndrome (V17 1)  Maternal Grandfather    8  Family history of Alzheimer Disease  Maternal Aunt    9  Family history of Arthritis (V17 7)   10  Family history of Diabetes Mellitus (V18 0)   11  Family history of Hypertension (V17 49)    Social History    · History of Being A Social Drinker   · Former smoker (V15 82) (I98 185)   · No alcohol use   · No drug use   · Occupation:    Current Meds    1  Daily Multiple Vitamins Oral Tablet; Therapy: (Recorded:2016) to Recorded   2  Gabapentin 300 MG Oral Capsule; For the first 3 days, 300mg po qam and 900mg po     qhs, then may increase to 600mg po qam and 900mg po qhs for next 3 days, then     900mg po BID; Therapy: 95RMG0201 to (Last Rx:60Mfl0001)  Requested for: 73VLN0149 Ordered   3  Isabell Root 550 MG Oral Capsule;      Therapy: (Recorded:02Sln1912) to Recorded   4  Naproxen 500 MG Oral Tablet; TAKE 1 TABLET EVERY 12 HOURS DAILY; Therapy: 52EAP2182 to (Evaluate:21Hsk6156)  Requested for: 37RSK9204; Last     Rx:59Rdo7692 Ordered   5  Turmeric 500 MG Oral Capsule; TAKE 2 CAPSULE Twice daily; Therapy: (Recorded:06Apr2017) to Recorded    Allergies   1   No Known Drug Allergies    Vitals   Vital Signs    Recorded: 78JEU4402 03:02PM   BP Comments unobtainable   Height 5 ft 1 in   Weight 215 lb    BMI Calculated 40 62   BSA Calculated 1 95     Future Appointments      Date/Time Provider Specialty Site   05/17/2018 01:00 PM ADELAIDA Saldaña   02/16/2018 02:00 PM Huey Ovalles, [address] [place] [address] [facility]   12/26/2017 10:40 AM Specialty Clinic, Podiatry  [address]   01/04/2018 05:20 PM Criss Salmeron, DO Family Medicine [address]     Signatures    Electronically signed by : ADELAIDA Salter ; Dec 26 2017  7:51AM EST                       (Author)

## 2018-01-04 ENCOUNTER — GENERIC CONVERSION - ENCOUNTER (OUTPATIENT)
Dept: OTHER | Facility: OTHER | Age: 48
End: 2018-01-04

## 2018-01-04 PROCEDURE — 87102 FUNGUS ISOLATION CULTURE: CPT | Performed by: FAMILY MEDICINE

## 2018-01-04 PROCEDURE — 87107 FUNGI IDENTIFICATION MOLD: CPT | Performed by: FAMILY MEDICINE

## 2018-01-05 ENCOUNTER — LAB REQUISITION (OUTPATIENT)
Dept: LAB | Facility: HOSPITAL | Age: 48
End: 2018-01-05
Payer: COMMERCIAL

## 2018-01-05 DIAGNOSIS — B35.1 TINEA UNGUIUM: ICD-10-CM

## 2018-01-09 ENCOUNTER — TRANSCRIBE ORDERS (OUTPATIENT)
Dept: ADMINISTRATIVE | Facility: HOSPITAL | Age: 48
End: 2018-01-09

## 2018-01-09 DIAGNOSIS — Z12.39 SCREENING BREAST EXAMINATION: Primary | ICD-10-CM

## 2018-01-10 NOTE — MISCELLANEOUS
Message  11/10/2017 @ 9270 - post op follow up phone call completed  Pt is sipping liquids, using IS as instructed, reinforced importance of using IS to help prevent pneumonia  Ambulating about home without difficulty  Pain controlled with analgesia  Reaffirmed examples of liquid diet over the next week  Pt experiencing nausea with protein drinks so was instructed to hold off on trying them for a few days  Pt stated understanding about discharge instructions and medication adjustments  Pt educated [place] [address] [employer] [facility]  Follow up appt with surgeon scheduled for next week  Instructed to call with any additional questions or concerns      Active Problems    1  Ankle pain (719 47) (M25 579)   2  Asymptomatic varicose veins, unspecified laterality (454 9) (I83 90)   3  Bilateral carpal tunnel syndrome (354 0) (G56 03)   4  Bradycardia, pre-operative cardiovascular examination (U05 27,428  89) (Z01 810,R00 1)   5  Bullae (709 8) (R23 8)   6  Carpal tunnel syndrome on left (354 0) (G56 02)   7  Carpal tunnel syndrome on right (354 0) (G56 01)   8  Dyshidrotic eczema (705 81) (L30 1)   9  Dyslipidemia (272 4) (E78 5)   10  Eczema (692 9) (L30 9)   11  Exposure to potentially hazardous body fluids (V15 85) (Z77 21)   12  Gastric bypass status for obesity (V45 86) (Z98 84)   13  Generalized obesity (278 00) (E66 9)   14  Herpes zoster (053 9) (B02 9)   15  History of thyroid nodule (V12 29) (Z86 39)   16  Large breasts (611 1) (N62)   17  Midline thoracic back pain (724 1) (M54 6)   18  Morbid obesity (278 01) (E66 01)   19  Oligomenorrhea (626 1) (N91 5)   20  Onychomycosis (110 1) (B35 1)   21  Paresthesia of arm (782 0) (R20 2)   22  Perimenopause (627 2) (N95 1)   23  Plantar fasciitis (728 71) (M72 2)   24  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)   25  Preoperative cardiovascular examination (V72 81) (Z01 810)   26  Pre-operative laboratory examination (V72 63) (Z01 812)   27   Screening for breast cancer (V76 10) (Z12 31)   28  Seborrheic keratosis (702 19) (L82 1)   29  Segmental and somatic dysfunction of cervical region (739 1) (M99 01)   30  Somatic dysfunction of spine, thoracic (739 2) (M99 02)   31  Tendonitis, Achilles (726 71) (M76 60)   32  Tendonitis, Achilles (726 71) (M76 60)   33  Thoracic myofascial strain (847 1) (S29 019A)   34  Tingling (782 0) (R20 2)   35  Trigger middle finger of right hand (727 03) (M65 331)    Current Meds   1  Acetaminophen Extra Strength 500 MG/15ML Oral Liquid; SWALLOW 20 ML Every 8   hours PRN; Therapy: 27XPT1346 to (Evaluate:84Had0859)  Requested for: 31Oct2017; Last   Rx:31Oct2017 Ordered   2  Calcium 600 MG Oral Tablet; TAKE 1 TABLET 3 times daily; Therapy: 03ZGK8562 to Recorded   3  Cyanocobalamin Powder; Therapy: 56HIK6261 to Recorded   4  Daily Multiple Vitamins Oral Tablet; Therapy: (Recorded:19Oct2016) to Recorded   5  Gabapentin 300 MG/6ML Oral Solution; Take 6mL by mouth in the morning and 18mL   by mouth at bedtime; Therapy: 27DIB3270 to (Evaluate:91Smr3876)  Requested for: 31Oct2017; Last   Rx:31Oct2017 Ordered   6  Melatonin 10 MG Oral Tablet; TAKE 1 TABLET Bedtime; Therapy: 03UMQ5644 to Recorded   7  Omeprazole 20 MG Oral Capsule Delayed Release; TAKE 1 CAPSULE DAILY EVERY   MORNING BEFORE BREAKFAST; Therapy: 16Hez2246 to (Evaluate:07Oct2018); Last Rx:12Oct2017; Status: ACTIVE -   Retrospective By Protocol Authorization Ordered   8  Turmeric 500 MG Oral Capsule; TAKE 2 CAPSULE Twice daily; Therapy: (Recorded:09Ybj1070) to Recorded    Allergies    1   No Known Drug Allergies    Signatures   Electronically signed by : Mireya Snow, ; Nov 10 2017  1:07PM EST                       (Author)

## 2018-01-10 NOTE — PROGRESS NOTES
Discussion/Summary  Discussion Summary:   Assess: Pt here for 4 of 6 pre-op nutrition follow-up  3 2# wt gain from previous visit  Total 6 4# wt gain  Pt came in with boot due to achilles tendonitis, which she states is affecting her exercise ability  Pt plans on purchasing a stationary bike for her home  Pt has been working on lesson plans 1-6 in bariatric manual and reading bariatric manual in preparation for surgery  Pt will begin exercising for 30-60 minutes 5 days per week  Pt will begin taking a daily multivitamin and 200 IU of vitamin D3  Pt will begin food journaling daily  Pt will begin drinking 64+ ounces of water or other calorie-free, caffeine-free beverages per day, eating three meals per day, and following the 30/60 minute rule to separate foods and fluids  Diagnose: Overweight/Obesity related to positive energy balance as evidenced by BMI>30, pts self-reported activity level and caloric intake  Intervene: Reviewed Pre-Surgery Goals and Lesson Plans in Bariatric Manuals  Reviewed first four lesson plans and pre-op checklist in-detail with pt and instructed her to complete first five lesson plans by next appointment  Pt will continue to work on these  workflow: Pt has July and August appointments scheduled for 7/14/17 with RD and 8/14/17 with bariatrician  Pt has EGD scheduled for 8/2/17  Monitor/Evaluate: Will monitor pts progress, weight, food journals, and available bloodwork at next follow-up on 7/14/17 at 10:30am       Active Problems    1  Ankle pain (719 47) (M25 579)   2  Asymptomatic varicose veins, unspecified laterality (454 9) (I83 90)   3  Bilateral carpal tunnel syndrome (354 0) (G56 03)   4  Bradycardia, pre-operative cardiovascular examination (B32 57,467  89) (Z01 810,R00 1)   5  Carpal tunnel syndrome on left (354 0) (G56 02)   6  Carpal tunnel syndrome on right (354 0) (G56 01)   7  Dyshidrotic eczema (705 81) (L30 1)   8  Dyslipidemia (272 4) (E78 5)   9   Eczema (692 9) (L30 9)   10  Exposure to potentially hazardous body fluids (V15 85) (Z77 21)   11  Generalized obesity (278 00) (E66 9)   12  Herpes zoster (053 9) (B02 9)   13  History of thyroid nodule (V12 29) (Z86 39)   14  Large breasts (611 1) (N62)   15  Midline thoracic back pain (724 1) (M54 6)   16  Morbid obesity (278 01) (E66 01)   17  Oligomenorrhea (626 1) (N91 5)   18  Paresthesia of arm (782 0) (R20 2)   19  Perimenopause (627 2) (N95 1)   20  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)   21  Preoperative cardiovascular examination (V72 81) (Z01 810)   22  Pre-operative laboratory examination (V72 63) (Z01 812)   23  Screening for breast cancer (V76 10) (Z12 39)   24  Seborrheic keratosis (702 19) (L82 1)   25  Segmental and somatic dysfunction of cervical region (739 1) (M99 01)   26  Somatic dysfunction of spine, thoracic (739 2) (M99 02)   27  Tendonitis, Achilles (726 71) (M76 60)   28  Tendonitis, Achilles (726 71) (M76 60)   29  Thoracic myofascial strain (847 1) (S29 019A)   30  Tingling (782 0) (R20 2)   31  Trigger middle finger of right hand (727 03) (M65 331)    Past Medical History    1  Acute conjunctivitis (372 00) (H10 30)   2  Acute frontal sinusitis (461 1) (J01 10)   3  History of Bacterial vaginosis (616 10,041 9) (N76 0,B96 89)   4  History of Depression (311) (F32 9)   5  History of Encounter for consultation (V65 9) (Z71 9)   6  History of Encounter for screening mammogram for breast cancer (V76 12) (Z12 31)   7  History of Generalized obesity (278 00) (E66 9)   8  History of Heel pain (729 5) (M79 673)   9  History of acute sinusitis (V12 69) (Z87 09)   10  History of carpal tunnel syndrome (V12 49) (Z86 69)   11  History of viral warts (V12 09) (Z86 19)   12  History of Influenza vaccine needed (V04 81) (Z23)   13  History of Itching in the vaginal area (698 1) (L29 8)   14  History of Pain, upper back (724 5) (M54 9)   15  History of Palpitations (785 1) (R00 2)   16   History of Positive PPD (795 51) (R76 11)   17  History of Tendonitis, Achilles (726 71) (M68 60)   18  History of Visit for routine gyn exam (V72 31) (Z01 419)    Surgical History    1  History of  Section   2  History of Neuroplasty Decompression Median Nerve At Carpal Tunnel   3  History of Nose Surgery   4  History of Tonsillectomy   5  History of Tubal Ligation    Family History  Mother    1  Family history of Diabetes Mellitus (V18 0)   2  Family history of Heart Disease (V17 49)   3  Family history of Stroke Syndrome (V17 1)  Father    4  Family history of Heart Disease (V17 49)   5  Family history of Hypertension (V17 49)   6  Family history of Renal Disease  Maternal Grandmother    7  Family history of Stroke Syndrome (V17 1)  Maternal Grandfather    8  Family history of Alzheimer Disease  Maternal Aunt    9  Family history of Arthritis (V17 7)   10  Family history of Diabetes Mellitus (V18 0)   11  Family history of Hypertension (V17 49)    Social History    · History of Being A Social Drinker   · Former smoker (V15 82) (F92 190)   · No alcohol use   · No drug use   · Occupation:    Current Meds   1  Daily Multiple Vitamins Oral Tablet; Therapy: (Recorded:2016) to Recorded   2  Gabapentin 300 MG Oral Capsule; Take 600 mg (2 tabs) qAM and 900mg (3 tabs) q hs,   as currently used; Therapy: 29CAU3563 to (Last CC:29FHR3009)  Requested for: 80GKV1894 Ordered   3  Isabell Root 550 MG Oral Capsule; Therapy: (Recorded:02Oxt6879) to Recorded   4  Naproxen 500 MG Oral Tablet; TAKE 1 TABLET EVERY 12 HOURS DAILY; Therapy: 49KRV3562 to (Evaluate:19Dgo7862)  Requested for: 10ZNF7272; Last   Rx:26Hgb8130 Ordered   5  Turmeric 500 MG Oral Capsule; TAKE 2 CAPSULE Twice daily; Therapy: (Recorded:2017) to Recorded    Allergies    1   No Known Drug Allergies    Vitals  Signs   Recorded: 40FHB9460 01:32PM   Weight: 218 lb 14 4 oz  BMI Calculated: 41 36  BSA Calculated: 1 96    Future Appointments    Date/Time Provider Specialty Site   06/27/2017 02:50 PM ADELAIDA Campo  Orthopedic Surgery [facility]   08/14/2017 10:30 AM ADELAIDA Leos  Bariatric Medicine [facility] WEIGHT MANAGEMENT [place]   03/01/2018 11:00 AM ADELAIDA Lemus  [facility]   07/25/2017 03:20 PM Paula De Leon, [address] [place] [address]     Signatures   Electronically signed by : DANNIE Goodson RD; Jun 23 2017  1:33PM EST                       (Author)    Electronically signed by :  ADELAIDA Licona ; Jun 27 2017 11:20AM EST

## 2018-01-11 NOTE — MISCELLANEOUS
Plan  Bilateral carpal tunnel syndrome    · Gabapentin 300 MG/6ML Oral Solution; 8mL po q AM and 8mL po qhs for 1 week  and then 4mLpo q AM and 4mL po qhs for 1 week, then stop medication   Rx By: Brynn Moreira; Dispense: 14 Days ; #:168 ML; Refill: 0; For: Bilateral carpal tunnel syndrome; ZELALEM = N; Verified Transmission to [facility][id number] Last Updated By: System, [employer]; 11/14/2017 3:35:31 PM  Influenza vaccine needed    · Fluzone Quadrivalent 0 5 ML Intramuscular Suspension Prefilled Syringe   For: Influenza vaccine needed; Ordered By:Trinh Aguirre; Effective Date:14Nov2017; Administered by: Holger Leak: 11/14/2017 1:59:00 PM    Discussion/Summary  Discussion Summary:   1  Bariatric surgery  - s/p bariatric Lauro-en-Y on 11/6/17, doing well  - on puree diet at this time, will transition to soft foods next week  - follow up with bariatric surgeon on 11/16/17  2  Midline thoracic pain   - recommended Pt take tylenol as prescribed last visit for pain  explained to her that it is safe to take the 1 tablet of percocet at night as well as the tylenol during the day  3  B/L CTS  - suggested that the Pt start weaning off her gabapentin since it no longer seems to be helping her night time numbness/tingling of her wrists  - 400mg BID for 1 week, then 200mg BID for 1 week, then stop    keep 3mo f/u that was prescheduled for 12/5/17  Counseling Documentation With Imm: The patient was counseled regarding instructions for management, risk factor reductions, patient and family education, impressions, importance of compliance with treatment  Medication SE Review and Pt Understands Tx: Possible side effects of new medications were reviewed with the patient/guardian today  The treatment plan was reviewed with the patient/guardian   The patient/guardian understands and agrees with the treatment plan   Self Referrals:   Self Referrals: No      Chief Complaint  Chief Complaint Free Text Note Form: follow up bariatric surgery      History of Present Illness  TCM Communication [facility]: The patient is being contacted for follow-up after hospitalization  Hospital records were reviewed  She was hospitalized at [facility]  The date of admission: 11/06/2017, date of discharge: 11/08/2017  Diagnosis: Morbid Obesity due to excessive calories, Hyperlipidemia, S/P bypass Gastric Lauro-en-y laparoscopic, Hernia repaiir  She was discharged to home  Medications reviewed and updated today  She scheduled a follow up appointment  Symptoms: swelling and swelling location abdominal  The patient is currently experiencing symptoms  Counseling was provided to the patient  Topics counseled included instructions for management, patient and family education, activities of daily living and importance of compliance with treatment  Communication performed and completed by Claudene Lyme LPN   HPI: Admission Date: 11/6/2017   Â   Discharge Date: 11/08/17  Â   Admitting Diagnosis: Morbid (severe) obesity due to excess calories ([facility] [address] ) [E66 01]  Hyperlipidemia [E78 5]  Â   Secondary Diagnosis:   MedicalÂ History  Past Medical History:  Diagnosis Date  â¢ Achilles tendinitis Â   Â  right   â¢ Back pain Â   â¢ Depression Â   â¢ GERD (gastroesophageal reflux disease) Â   â¢ Hyperlipidemia Â   â¢ Multiple thyroid nodules Â   â¢ Plantar fasciitis Â   Â  bilateral  â¢ Varicose vein of leg Â     Â   Â   Discharge Diagnosis: Same  Â   Procedures Performed: Procedure(s):  BYPASS GASTRIC LAURO-EN-Y LAPAROSCOPIC  ESOPHAGOGASTRODUODENOSCOPY (EGD)  REPAIR HERNIA PARAESOPHAGEAL LAPAROSCOPIC  Â   Consults: Internal Medicine   NAYE LOPEZ [facility] Course: Patient with morbid obesity was admitted to the hospital on 11/6/2017 for elective Procedure(s):  BYPASS GASTRIC LAURO-EN-Y LAPAROSCOPIC  ESOPHAGOGASTRODUODENOSCOPY (EGD)  REPAIR HERNIA PARAESOPHAGEAL LAPAROSCOPIC   Postoperatively, the patient was stable and transferred to the [address] for further observation  Postop day one, the patient was tolerating a liquid diet and pain was controlled oral pain medications  Patient stayed in hospital on POD#1 due to post operative nausea  this was relieved with reglan  On POD#2 she was tolerating her clear liquid diet The patient was ambulating without assistance, vital signs and lab work were stable  The patient was cleared for discharge on 11/08/17  Patient discharged home, she is day 3 of procedure  She states her abdomen is swollen and bruised  She is tolerating liquids, she is able to swallow medications without any difficulty  She is scheduled for follow up with surgeon  Â   Disposition: Home   Call physician for temperature over 101, wound redness or discharge, vomiting, or intolerance to diet  Â   Â   Discharge Medications:  See after visit summary for reconciled discharge medications provided to patient and family  Â   HPI 11/14/17:     Pt doing well after bariatric surgery on 11/6/17 with paraesophageal hernia repair  continues to have some nausea, but no vomiting  is taking vitamins prescribed by surgeon  takes percocet qhs some nights when pain is worse, but not every night  abdominal swelling and bruising has decreased,  she continues to have back pain, has not taken the tylenol  gabapentin is no longer helping her wrist numbness/tingling, which is mostly gone after the B/L carpal tunnel release, except for some occasional, intermittent night time tingling  Review of Systems  Complete-Female:   Constitutional: no fever, no chills and not feeling tired  ENT: no earache and no sore throat  Cardiovascular: no chest pain and no palpitations  Respiratory: no shortness of breath and no cough  Gastrointestinal: as noted in HPI  Musculoskeletal: as noted in HPI  Integumentary: no rashes  ROS Reviewed:   ROS reviewed  Active Problems    1  Ankle pain (401 47) (C53 233)   2   Asymptomatic varicose veins, unspecified laterality (454 9) (I83 90)   3  Bilateral carpal tunnel syndrome (354 0) (G56 03)   4  Bradycardia, pre-operative cardiovascular examination (K75 56,001  89) (Z01 810,R00 1)   5  Bullae (709 8) (R23 8)   6  Carpal tunnel syndrome on left (354 0) (G56 02)   7  Carpal tunnel syndrome on right (354 0) (G56 01)   8  Dyshidrotic eczema (705 81) (L30 1)   9  Dyslipidemia (272 4) (E78 5)   10  Eczema (692 9) (L30 9)   11  Exposure to potentially hazardous body fluids (V15 85) (Z77 21)   12  Generalized obesity (278 00) (E66 9)   13  Herpes zoster (053 9) (B02 9)   14  History of thyroid nodule (V12 29) (Z86 39)   15  Large breasts (611 1) (N62)   16  Midline thoracic back pain (724 1) (M54 6)   17  Morbid obesity (278 01) (E66 01)   18  Oligomenorrhea (626 1) (N91 5)   19  Onychomycosis (110 1) (B35 1)   20  Paresthesia of arm (782 0) (R20 2)   21  Perimenopause (627 2) (N95 1)   22  Plantar fasciitis (728 71) (M72 2)   23  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)   24  Preoperative cardiovascular examination (V72 81) (Z01 810)   25  Pre-operative laboratory examination (V72 63) (Z01 812)   26  Screening for breast cancer (V76 10) (Z12 31)   27  Seborrheic keratosis (702 19) (L82 1)   28  Segmental and somatic dysfunction of cervical region (739 1) (M99 01)   29  Somatic dysfunction of spine, thoracic (739 2) (M99 02)   30  Tendonitis, Achilles (726 71) (M76 60)   31  Tendonitis, Achilles (726 71) (M76 60)   32  Thoracic myofascial strain (847 1) (S29 019A)   33  Tingling (782 0) (R20 2)   34  Trigger middle finger of right hand (727 03) (M65 331)    Past Medical History    1  Acute conjunctivitis (372 00) (H10 30)   2  Acute frontal sinusitis (461 1) (J01 10)   3  History of Bacterial vaginosis (616 10,041 9) (N76 0,B96 89)   4  History of Depression (311) (F32 9)   5  History of Encounter for consultation (V65 9) (Z71 9)   6  History of Encounter for screening mammogram for breast cancer (V76 12) (Z12 31)   7   History of Generalized obesity (278 00) (E66 9)   8  History of Heel pain (729 5) (M79 673)   9  History of acute sinusitis (V12 69) (Z87 09)   10  History of carpal tunnel syndrome (V12 49) (Z86 69)   11  History of viral warts (V12 09) (Z86 19)   12  History of Influenza vaccine needed (V04 81) (Z23)   13  History of Itching in the vaginal area (698 1) (L29 8)   14  History of Pain, upper back (724 5) (M54 9)   15  History of Palpitations (785 1) (R00 2)   16  History of Positive PPD (795 51) (R76 11)   17  History of Tendonitis, Achilles (726 71) (M76 60)   18  History of Visit for routine gyn exam (V72 31) (Z01 419)    Surgical History    1  History of  Section   2  History of Neuroplasty Decompression Median Nerve At Carpal Tunnel   3  History of Nose Surgery   4  History of Tonsillectomy   5  History of Tubal Ligation  Surgical History Reviewed: The surgical history was reviewed and updated today  Family History  Mother    1  Family history of Diabetes Mellitus (V18 0)   2  Family history of Heart Disease (V17 49)   3  Family history of Stroke Syndrome (V17 1)  Father    4  Family history of Heart Disease (V17 49)   5  Family history of Hypertension (V17 49)   6  Family history of Renal Disease  Maternal Grandmother    7  Family history of Stroke Syndrome (V17 1)  Maternal Grandfather    8  Family history of Alzheimer Disease  Maternal Aunt    9  Family history of Arthritis (V17 7)   10  Family history of Diabetes Mellitus (V18 0)   11  Family history of Hypertension (V17 49)  Family History Reviewed: The family history was reviewed and updated today  Social History    · History of Being A Social Drinker   · Former smoker (Y59 94) (G16 657)   · No alcohol use   · No drug use   · Occupation:  Social History Reviewed: The social history was reviewed and updated today  Current Meds   1  Acetaminophen Extra Strength 500 MG/15ML Oral Liquid; SWALLOW 20 ML Every 8   hours PRN;    Therapy: 23JFQ6218 to (Evaluate:79Xig5261)  Requested for: 31Oct2017; Last   Rx:31Oct2017 Ordered   2  Calcium 600 MG Oral Tablet; TAKE 1 TABLET 3 times daily; Therapy: 68BZX5112 to Recorded   3  Cyanocobalamin Powder; Therapy: 80AOS0266 to Recorded   4  Daily Multiple Vitamins Oral Tablet; Therapy: (Recorded:19Oct2016) to Recorded   5  Gabapentin 300 MG/6ML Oral Solution; Take 6mL by mouth in the morning and 18mL by   mouth at bedtime; Therapy: 32XNR3877 to (Evaluate:75Mbn6647)  Requested for: 31Oct2017; Last   Rx:31Oct2017 Ordered   6  Isabell Root 550 MG Oral Capsule; Therapy: (Recorded:55Bse3464) to Recorded   7  Melatonin 10 MG Oral Tablet; TAKE 1 TABLET Bedtime; Therapy: 84EXC4212 to Recorded   8  Omeprazole 20 MG Oral Capsule Delayed Release; TAKE 1 CAPSULE DAILY EVERY   MORNING BEFORE BREAKFAST; Therapy: 84Hfn0641 to (Evaluate:07Oct2018); Last Rx:12Oct2017; Status: ACTIVE -   Retrospective By Protocol Authorization Ordered   9  Turmeric 500 MG Oral Capsule; TAKE 2 CAPSULE Twice daily; Therapy: (Recorded:79Msq7988) to Recorded  Medication List Reviewed: The medication list was reviewed and updated today  Allergies    1  No Known Drug Allergies    Vitals  Signs   Recorded: 18NCW6607 01:56PM   Temperature: 98 4 F  Heart Rate: 80  Respiration: 18  Systolic: 475  Diastolic: 70  Height: 5 ft 1 in  Weight: 198 lb   BMI Calculated: 37 41  BSA Calculated: 1 88    Physical Exam    Constitutional   General appearance: No acute distress, well appearing and well nourished  obese  Eyes   Conjunctiva and lids: No swelling, erythema or discharge  Pulmonary   Respiratory effort: No increased work of breathing or signs of respiratory distress  Auscultation of lungs: Clear to auscultation  Cardiovascular   Auscultation of heart: Normal rate and rhythm, normal S1 and S2, without murmurs      Examination of extremities for edema and/or varicosities: Normal     Abdomen   Abdomen: Abnormal   soft, non-distended, BS+, non-tender, no rebound/guarding, multiple abdominal wounds on abdomen, well approximated with abdominal bandages overlaying them, clean, dry, intact, multiple scattered red patches with excoriations across abdomen c/w marks from tape,  Lymphatic   Palpation of lymph nodes in neck: No lymphadenopathy  Musculoskeletal   Gait and station: Normal     Skin see skin findings above in abdomen  Psychiatric   Orientation to person, place, and time: Normal     Mood and affect: Normal          Results/Data  (1) THIN PREP PAP WITH IMAGING 28Oct2014 12:00AM      Test Name Result Flag Reference   THIN PREP PAP W  IMAG COMMENT 10/28/2014       Summary / No summary entered :      No summary entered  Documents attached :      sPap Tests - Kirsten Hernández; Enc: [id number] - Appointment - Kirsten [place] - [facility]) (Additional Information Document)    Attending Note  Attending Note: Attending Note: I agree with the Resident management plan as it was presented to me  Level of Participation: I was present in clinic, but did not examine the patient  I agree with the Resident's note  Message   Recorded as Task   Date: 11/08/2017 04:44 PM, Created By: System   Task Name: Maria Elena Strickland   [facility]   Regarding Patient: Cayla Burton, Status: In Progress   Comment:    System - 08 Nov 2017 4:44 PM     Patient discharged from hospital   Patient Name: Jack Gutierrez  Patient YOB: 1970  Discharge Date: 11/8/2017  Facility: [facility] 39 Nov 2017 4:51 PM     TASK REASSIGNED: [facility] 09 Nov 2017 3:20 PM     [place] [address] - 09 Nov 2017 3:21 PM     TASK REASSIGNED: Previously Assigned To [facility] triage,Team  CONTACTED PATIENT, APPOINTMENT SCHEDULED FOR 11/14/17 WITH DR Elaine Cabrera, MEDICATIONS RECONCILED       Future Appointments    Date/Time Provider Specialty Site   05/17/2018 01:00 PM Anastasiya Boyd, M D  [facility] [place]   02/16/2018 02:00 PM Sushil Mohamudon, [address] [facility] Surgery [facility]   12/20/2017 03:00 PM LAVINIA Fuller, RD Dietitian [facility]   12/26/2017 10:40 AM Specialty Clinic, Podiatry  [place] [address]   12/05/2017 01:20 PM Chadd Urias DO Family Medicine [address]     Signatures   Electronically signed by : Ruthie Lanes, DO; Nov 14 2017  3:43PM EST                       (Author)    Electronically signed by : Ruthie Lanes, DO; Nov 19 2017  6:42PM EST                       (Author)    Electronically signed by : ADELAIDA Elise ; Nov 20 2017  8:46AM EST                       (Author)

## 2018-01-11 NOTE — PROGRESS NOTES
History of Present Illness  Bariatric MNT [facility] Screening Preop [facility] Post-Op Visit: Reviewed post-operative pureed and soft foods diet with pt  Discussed gradual diet advancement and portion size progression  Discussed adequate hydration, signs and symptoms of dehydration  Discussed recommended post-operative vitamin supplementation and protein supplements  Discussed importance of regular physical activity  Provided pt with contact information for future questions/concerns  Active Problems    1  Ankle pain (719 47) (M25 579)   2  Asymptomatic varicose veins, unspecified laterality (454 9) (I83 90)   3  Bilateral carpal tunnel syndrome (354 0) (G56 03)   4  Bradycardia, pre-operative cardiovascular examination (E55 77,216  89) (Z01 810,R00 1)   5  Bullae (709 8) (R23 8)   6  Carpal tunnel syndrome on left (354 0) (G56 02)   7  Carpal tunnel syndrome on right (354 0) (G56 01)   8  Dyshidrotic eczema (705 81) (L30 1)   9  Dyslipidemia (272 4) (E78 5)   10  Eczema (692 9) (L30 9)   11  Exposure to potentially hazardous body fluids (V15 85) (Z77 21)   12  Gastric bypass status for obesity (V45 86) (Z98 84)   13  Generalized obesity (278 00) (E66 9)   14  Herpes zoster (053 9) (B02 9)   15  History of thyroid nodule (V12 29) (Z86 39)   16  Influenza vaccine needed (V04 81) (Z23)   17  Large breasts (611 1) (N62)   18  Midline thoracic back pain (724 1) (M54 6)   19  Morbid obesity (278 01) (E66 01)   20  Nausea (787 02) (R11 0)   21  Oligomenorrhea (626 1) (N91 5)   22  Onychomycosis (110 1) (B35 1)   23  Paresthesia of arm (782 0) (R20 2)   24  Perimenopause (627 2) (N95 1)   25  Plantar fasciitis (728 71) (M72 2)   26  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)   27  Postgastrectomy malabsorption (579 3) (K91 2,Z90 3)   28  Preoperative cardiovascular examination (V72 81) (Z01 810)   29  Pre-operative laboratory examination (V72 63) (Z01 812)   30   Screening for breast cancer (V76 10) (Z12 31)   31  Seborrheic keratosis (702 19) (L82 1)   32  Segmental and somatic dysfunction of cervical region (739 1) (M99 01)   33  Somatic dysfunction of spine, thoracic (739 2) (M99 02)   34  Tendonitis, Achilles (726 71) (M76 60)   35  Tendonitis, Achilles (726 71) (M76 60)   36  Thoracic myofascial strain (847 1) (S29 019A)   37  Tingling (782 0) (R20 2)   38  Trigger middle finger of right hand (727 03) (M65 331)    Past Medical History    1  Acute conjunctivitis (372 00) (H10 30)   2  Acute frontal sinusitis (461 1) (J01 10)   3  History of Bacterial vaginosis (616 10,041 9) (N76 0,B96 89)   4  History of Depression (311) (F32 9)   5  History of Encounter for consultation (V65 9) (Z71 9)   6  History of Encounter for screening mammogram for breast cancer (V76 12) (Z12 31)   7  History of Generalized obesity (278 00) (E66 9)   8  History of Heel pain (729 5) (M79 673)   9  History of acute sinusitis (V12 69) (Z87 09)   10  History of carpal tunnel syndrome (V12 49) (Z86 69)   11  History of viral warts (V12 09) (Z86 19)   12  History of Influenza vaccine needed (V04 81) (Z23)   13  History of Itching in the vaginal area (698 1) (L29 8)   14  History of Pain, upper back (724 5) (M54 9)   15  History of Palpitations (785 1) (R00 2)   16  History of Positive PPD (795 51) (R76 11)   17  History of Tendonitis, Achilles (726 71) (M76 60)   18  History of Visit for routine gyn exam (V72 31) (Z01 419)    Surgical History    1  History of  Section   2  History of Gastric Surgery For Morbid Obesity Bypass With Lauro-en-Y   3  History of Neuroplasty Decompression Median Nerve At Carpal Tunnel   4  History of Nose Surgery   5  History of Tonsillectomy   6  History of Tubal Ligation    Family History  Mother    1  Family history of Diabetes Mellitus (V18 0)   2  Family history of Heart Disease (V17 49)   3  Family history of Stroke Syndrome (V17 1)  Father    4  Family history of Heart Disease (V17 49)   5   Family history of Hypertension (V17 49)   6  Family history of Renal Disease  Maternal Grandmother    7  Family history of Stroke Syndrome (V17 1)  Maternal Grandfather    8  Family history of Alzheimer Disease  Maternal Aunt    9  Family history of Arthritis (V17 7)   10  Family history of Diabetes Mellitus (V18 0)   11  Family history of Hypertension (V17 49)    Social History    · History of Being A Social Drinker   · Former smoker (V15 82) (N03 527)   · No alcohol use   · No drug use   · Occupation:    Current Meds   1  Acetaminophen Extra Strength 500 MG/15ML Oral Liquid; SWALLOW 20 ML Every 8   hours PRN; Therapy: 52MGT1566 to (Evaluate:75Nwb2299)  Requested for: 31Oct2017; Last   Rx:31Oct2017 Ordered   2  Calcium 600 MG Oral Tablet; TAKE 1 TABLET 3 times daily; Therapy: 52DZB1018 to Recorded   3  Cyanocobalamin Powder; Therapy: 74RPC4641 to Recorded   4  Daily Multiple Vitamins Oral Tablet; Therapy: (Recorded:01Ams3251) to Recorded   5  Gabapentin 300 MG/6ML Oral Solution; 8mL po q AM and 8mL po qhs for 1 week and   then 4mLpo q AM and 4mL po qhs for 1 week, then stop medication; Therapy: 34RTJ4111 to (Leighann Verdin)  Requested for: 42QYE3028; Last   Rx:14Nov2017 Ordered   6  Melatonin 10 MG Oral Tablet; TAKE 1 TABLET Bedtime; Therapy: 29FTE6848 to Recorded   7  Omeprazole 20 MG Oral Capsule Delayed Release; TAKE 1 CAPSULE DAILY EVERY   MORNING BEFORE BREAKFAST; Therapy: 23Krv1135 to (Evaluate:07Oct2018); Last Rx:12Oct2017 Ordered   8  Turmeric 500 MG Oral Capsule; TAKE 2 CAPSULE Twice daily; Therapy: (Recorded:91Kfr5245) to Recorded    Allergies    1   No Known Drug Allergies    Vitals  Signs   Recorded: 90XFM8334 10:44AM   Temperature: 98 2 F  Heart Rate: 70  Respiration: 18  Systolic: 717  Diastolic: 64  Height: 5 ft 1 in  Weight: 193 lb 8 0 oz  BMI Calculated: 36 56  BSA Calculated: 1 86    Future Appointments    Date/Time Provider Specialty Site   05/17/2018 01:00 PM ADELAIDA Samuel  Plastic Surgery BODY EVOLUTION PLASTIC [facility]ONS [facility]   02/16/2018 02:00 PM Humaira Jackson, [address] [facility] Surgery [facility]   12/20/2017 03:00 PM LAVINIA Delgado, RD Dietitian [facility]   12/26/2017 10:40 AM Specialty Clinic, Podiatry  [address]   12/05/2017 01:20 PM Yin Khan, DO Family Medicine [address]     Signatures   Electronically signed by : LAVINIA Jeffrey; Nov 16 2017  1:06PM EST                       (Author)    Electronically signed by :  ADELAIDA Bearden ; Dec  4 2017  7:53AM EST

## 2018-01-12 VITALS
WEIGHT: 213.1 LBS | BODY MASS INDEX: 40.23 KG/M2 | TEMPERATURE: 97.6 F | DIASTOLIC BLOOD PRESSURE: 68 MMHG | HEART RATE: 68 BPM | HEIGHT: 61 IN | RESPIRATION RATE: 16 BRPM | SYSTOLIC BLOOD PRESSURE: 112 MMHG

## 2018-01-12 VITALS
RESPIRATION RATE: 18 BRPM | DIASTOLIC BLOOD PRESSURE: 70 MMHG | WEIGHT: 198 LBS | BODY MASS INDEX: 37.38 KG/M2 | TEMPERATURE: 98.4 F | HEIGHT: 61 IN | SYSTOLIC BLOOD PRESSURE: 124 MMHG | HEART RATE: 80 BPM

## 2018-01-12 VITALS
BODY MASS INDEX: 40.12 KG/M2 | WEIGHT: 212.5 LBS | HEIGHT: 61 IN | SYSTOLIC BLOOD PRESSURE: 120 MMHG | TEMPERATURE: 98.1 F | HEART RATE: 84 BPM | DIASTOLIC BLOOD PRESSURE: 80 MMHG | RESPIRATION RATE: 21 BRPM

## 2018-01-12 VITALS
TEMPERATURE: 96.9 F | SYSTOLIC BLOOD PRESSURE: 112 MMHG | HEART RATE: 72 BPM | WEIGHT: 220.5 LBS | DIASTOLIC BLOOD PRESSURE: 80 MMHG | RESPIRATION RATE: 14 BRPM | HEIGHT: 61 IN | BODY MASS INDEX: 41.63 KG/M2

## 2018-01-12 VITALS
DIASTOLIC BLOOD PRESSURE: 73 MMHG | SYSTOLIC BLOOD PRESSURE: 111 MMHG | HEART RATE: 78 BPM | BODY MASS INDEX: 38.99 KG/M2 | WEIGHT: 206.5 LBS | HEIGHT: 61 IN

## 2018-01-12 VITALS — HEIGHT: 61 IN | WEIGHT: 212.5 LBS | BODY MASS INDEX: 40.12 KG/M2

## 2018-01-12 NOTE — PROGRESS NOTES
Discussion/Summary  Discussion Summary:   Assess: Pt here for 5 of 6 pre-op nutrition follow-up  6 2# wt loss from previous visit  Net 0 2# wt gain from initial visit  Pt has been working on lesson plans 1-6 in bariatric manual and reading bariatric manual in preparation for surgery  Pt is doing physical therapy for achillese tendonitis twice weekly  Pt got a stationary bike and is biking for 60 minutes 6-7 days per week  Pt purchased a womens multivitamin and will also begin 2000 IU vitamin D3 daily prior to surgery  Pt will begin food journaling daily  Pt is drinking 64+ ounces of water or other calorie-free, caffeine-free beverages per day, eating three meals per day, and following the 30/60 minute rule to separate foods and fluids  Diagnose: Overweight/Obesity related to positive energy balance as evidenced by BMI>30, pts self-reported activity level and caloric intake  (Improving)  Intervene: Reviewed Pre-Surgery Goals and Lesson Plans in Bariatric Manuals  Pt will continue to work on these  Provided pt with 1200 calorie 5-day sample menu, 24-hr sample schedule, and protein supplement suggestions list   Workflow: Pt has EGD scheduled for 8/2/17 and 6 of 6 with Dr Ysabel Escalante on 8/14/17  Monitor/Evaluate: Will monitor pts progress, weight, food journals, and available bloodwork at next follow-up  Active Problems    1  Ankle pain (719 47) (M25 579)   2  Asymptomatic varicose veins, unspecified laterality (454 9) (I83 90)   3  Bilateral carpal tunnel syndrome (354 0) (G56 03)   4  Bradycardia, pre-operative cardiovascular examination (W22 99,627  89) (Z01 810,R00 1)   5  Carpal tunnel syndrome on left (354 0) (G56 02)   6  Carpal tunnel syndrome on right (354 0) (G56 01)   7  Dyshidrotic eczema (705 81) (L30 1)   8  Dyslipidemia (272 4) (E78 5)   9  Eczema (692 9) (L30 9)   10  Exposure to potentially hazardous body fluids (V15 85) (Z77 21)   11  Generalized obesity (278 00) (E66 9)   12   Herpes zoster (053 9) (B02 9)   13  History of thyroid nodule (V12 29) (Z86 39)   14  Large breasts (611 1) (N62)   15  Midline thoracic back pain (724 1) (M54 6)   16  Morbid obesity (278 01) (E66 01)   17  Oligomenorrhea (626 1) (N91 5)   18  Paresthesia of arm (782 0) (R20 2)   19  Perimenopause (627 2) (N95 1)   20  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)   21  Preoperative cardiovascular examination (V72 81) (Z01 810)   22  Pre-operative laboratory examination (V72 63) (Z01 812)   23  Screening for breast cancer (V76 10) (Z12 39)   24  Seborrheic keratosis (702 19) (L82 1)   25  Segmental and somatic dysfunction of cervical region (739 1) (M99 01)   26  Somatic dysfunction of spine, thoracic (739 2) (M99 02)   27  Tendonitis, Achilles (726 71) (M76 60)   28  Tendonitis, Achilles (726 71) (M76 60)   29  Thoracic myofascial strain (847 1) (S29 019A)   30  Tingling (782 0) (R20 2)   31  Trigger middle finger of right hand (727 03) (M65 331)    Past Medical History    1  Acute conjunctivitis (372 00) (H10 30)   2  Acute frontal sinusitis (461 1) (J01 10)   3  History of Bacterial vaginosis (616 10,041 9) (N76 0,B96 89)   4  History of Depression (311) (F32 9)   5  History of Encounter for consultation (V65 9) (Z71 9)   6  History of Encounter for screening mammogram for breast cancer (V76 12) (Z12 31)   7  History of Generalized obesity (278 00) (E66 9)   8  History of Heel pain (729 5) (M79 673)   9  History of acute sinusitis (V12 69) (Z87 09)   10  History of carpal tunnel syndrome (V12 49) (Z86 69)   11  History of viral warts (V12 09) (Z86 19)   12  History of Influenza vaccine needed (V04 81) (Z23)   13  History of Itching in the vaginal area (698 1) (L29 8)   14  History of Pain, upper back (724 5) (M54 9)   15  History of Palpitations (785 1) (R00 2)   16  History of Positive PPD (795 51) (R76 11)   17  History of Tendonitis, Achilles (726 71) (M76 60)   18   History of Visit for routine gyn exam (V72 31) (Z01 419)    Surgical History    1  History of  Section   2  History of Neuroplasty Decompression Median Nerve At Carpal Tunnel   3  History of Nose Surgery   4  History of Tonsillectomy   5  History of Tubal Ligation    Family History  Mother    1  Family history of Diabetes Mellitus (V18 0)   2  Family history of Heart Disease (V17 49)   3  Family history of Stroke Syndrome (V17 1)  Father    4  Family history of Heart Disease (V17 49)   5  Family history of Hypertension (V17 49)   6  Family history of Renal Disease  Maternal Grandmother    7  Family history of Stroke Syndrome (V17 1)  Maternal Grandfather    8  Family history of Alzheimer Disease  Maternal Aunt    9  Family history of Arthritis (V17 7)   10  Family history of Diabetes Mellitus (V18 0)   11  Family history of Hypertension (V17 49)    Social History    · History of Being A Social Drinker   · Former smoker (V15 82) (B28 039)   · No alcohol use   · No drug use   · Occupation:    Current Meds   1  Daily Multiple Vitamins Oral Tablet; Therapy: (Recorded:20Rap8223) to Recorded   2  Gabapentin 300 MG Oral Capsule; Take 600 mg (2 tabs) qAM and 900mg (3 tabs) q hs,   as currently used; Therapy: 30EBB4859 to (Last XO:47TCC9650)  Requested for: 99UQY1085 Ordered   3  Isabell Root 550 MG Oral Capsule; Therapy: (Recorded:77Jrc7685) to Recorded   4  Naproxen 500 MG Oral Tablet; TAKE 1 TABLET EVERY 12 HOURS DAILY; Therapy: 79PNI5997 to (Evaluate:83Mmz8510)  Requested for: 84JSN6329; Last   Rx:46Aky9128 Ordered   5  Turmeric 500 MG Oral Capsule; TAKE 2 CAPSULE Twice daily; Therapy: (Recorded:88Npe3430) to Recorded    Allergies    1  No Known Drug Allergies    Vitals  Signs   Recorded: 36HVS0491 10:54AM   Weight: 212 lb 11 2 oz  BMI Calculated: 40 19  BSA Calculated: 1 94    Future Appointments    Date/Time Provider Specialty Site   2017 09:30 AM ADELAIDA Franco   Orthopedic Surgery [facility]   2017 10:30 AM Fazal Hilton ADELAIDA Jones  Bariatric Medicine [facility]   03/01/2018 11:00 AM ADELAIDA Burton   07/25/2017 03:20 PM Paul Pierce [address]     Signatures   Electronically signed by : DANNIE Thurman RD; Jul 14 2017 10:55AM EST                       (Author)    Electronically signed by :  ADELAIDA Balbuena ; Jul 24 2017 12:15PM EST

## 2018-01-13 VITALS — BODY MASS INDEX: 40.19 KG/M2 | WEIGHT: 212.7 LBS

## 2018-01-13 VITALS
RESPIRATION RATE: 18 BRPM | WEIGHT: 193.5 LBS | BODY MASS INDEX: 36.53 KG/M2 | HEART RATE: 70 BPM | HEIGHT: 61 IN | DIASTOLIC BLOOD PRESSURE: 64 MMHG | SYSTOLIC BLOOD PRESSURE: 128 MMHG | TEMPERATURE: 98.2 F

## 2018-01-13 VITALS
HEIGHT: 61 IN | SYSTOLIC BLOOD PRESSURE: 121 MMHG | BODY MASS INDEX: 40.02 KG/M2 | HEART RATE: 78 BPM | WEIGHT: 212 LBS | DIASTOLIC BLOOD PRESSURE: 77 MMHG

## 2018-01-13 VITALS
SYSTOLIC BLOOD PRESSURE: 110 MMHG | HEIGHT: 61 IN | DIASTOLIC BLOOD PRESSURE: 68 MMHG | BODY MASS INDEX: 39.89 KG/M2 | HEART RATE: 65 BPM | TEMPERATURE: 98.6 F | WEIGHT: 211.3 LBS

## 2018-01-13 VITALS
DIASTOLIC BLOOD PRESSURE: 80 MMHG | HEART RATE: 63 BPM | SYSTOLIC BLOOD PRESSURE: 108 MMHG | TEMPERATURE: 97.1 F | WEIGHT: 215 LBS | HEIGHT: 61 IN | BODY MASS INDEX: 40.59 KG/M2 | RESPIRATION RATE: 15 BRPM

## 2018-01-13 VITALS
TEMPERATURE: 97.5 F | RESPIRATION RATE: 16 BRPM | WEIGHT: 213.13 LBS | HEART RATE: 80 BPM | BODY MASS INDEX: 40.24 KG/M2 | DIASTOLIC BLOOD PRESSURE: 64 MMHG | SYSTOLIC BLOOD PRESSURE: 122 MMHG | HEIGHT: 61 IN

## 2018-01-13 VITALS — WEIGHT: 216 LBS | BODY MASS INDEX: 40.81 KG/M2

## 2018-01-13 VITALS — BODY MASS INDEX: 40.59 KG/M2 | WEIGHT: 215 LBS | HEIGHT: 61 IN

## 2018-01-13 VITALS
HEART RATE: 70 BPM | DIASTOLIC BLOOD PRESSURE: 62 MMHG | WEIGHT: 217 LBS | HEIGHT: 61 IN | BODY MASS INDEX: 40.97 KG/M2 | SYSTOLIC BLOOD PRESSURE: 112 MMHG

## 2018-01-13 VITALS
SYSTOLIC BLOOD PRESSURE: 102 MMHG | DIASTOLIC BLOOD PRESSURE: 67 MMHG | BODY MASS INDEX: 40.24 KG/M2 | HEART RATE: 68 BPM | WEIGHT: 213.13 LBS | HEIGHT: 61 IN

## 2018-01-13 VITALS — WEIGHT: 218.9 LBS | BODY MASS INDEX: 41.36 KG/M2

## 2018-01-13 VITALS
WEIGHT: 202 LBS | DIASTOLIC BLOOD PRESSURE: 78 MMHG | SYSTOLIC BLOOD PRESSURE: 118 MMHG | HEART RATE: 76 BPM | BODY MASS INDEX: 37.17 KG/M2 | HEIGHT: 62 IN

## 2018-01-13 VITALS
HEIGHT: 61 IN | DIASTOLIC BLOOD PRESSURE: 75 MMHG | BODY MASS INDEX: 40.97 KG/M2 | SYSTOLIC BLOOD PRESSURE: 114 MMHG | HEART RATE: 71 BPM | WEIGHT: 217 LBS

## 2018-01-13 NOTE — MISCELLANEOUS
Message  Advised patient that both generic zofran and melatonin may make her drowsy  Advised to hold melatonin temporarily while taking this  I discussed with [facility] in-patient pharmacist and he indicated it may have this effect if she is overly sensitive  Patient notes she will hold melatonin temporarily  CR      Plan  Nausea    · Ondansetron 4 MG Oral Tablet Disintegrating (Zofran ODT);  Take one tablet every 6  hours as needed for nausea    Signatures   Electronically signed by : Aníbal Hernandez, [facility]; Nov 13 2017  5:43PM EST                       (Author)

## 2018-01-13 NOTE — PROGRESS NOTES
Discussion/Summary  Discussion Summary:   Assess: Pt here for 3 of 6 wt chk  2 6# wt gain from previous appt  Net 3 3# wt gain  No changes in Dx or Rx noted  Pt brought manual to appointment  Took 24-hr food recall:  B: 12 oz cappuccino and egg, cheese, sausage croissant sandwich from [employer]  L: ham and cheese hoagie, regular soda  D: pasta with ground beef  S: 2 slices cake, 2 scoops ice cream  Pt reports struggle with sweet cravings, thinks this may be due to beginnings of menopause  PT states she currently has menstrual cycle every 4 months  Pt walks for 60 minutes 5 days per week  Diagnose: Overweight/Obesity related to positive energy balance as evidenced by BMI >30 and pt's self-reported energy intake  (Continued)  Intervene: Discussed sweet cravings with pt  Discussed physical hunger vs emotional hunger, discussed mindful/intuitive eating  Discussed healthier, lower-calorie alternative foods pt can use to substitute for high sugar snacks  Instructed pt to eliminate soda and eliminate desserts and high sugar sweets  Also reviewed appropriate meal portions, meal planning / Instructed pt to use measuring cups  Provided pt with list on nonstarchy vegetables and 5 days of sample 1200 calorie menus  Reviewed workflow with pt: Pt had PCP letter from 3/3/17  Pt had Cardiac clearance on 4/6/17  Pt has EGD scheduled for 8/2/17  Pt needs June, July, and August wt checks and at least 4# wt loss  Pt verbalized understanding, no further questions at present, expect good compliance  Monitor/Evaluate: Will monitor food journals, weight, pt's reported compliance with goals at next appointment  Follow-up scheduled for: June 23, @ 10:30am       Active Problems    1  Ankle pain (719 47) (M25 579)   2  Asymptomatic varicose veins, unspecified laterality (454 9) (I83 90)   3  Bilateral carpal tunnel syndrome (354 0) (G56 03)   4  Bradycardia, pre-operative cardiovascular examination (P14 16,093  89) (Z01 810,R00 1)   5  Carpal tunnel syndrome on left (354 0) (G56 02)   6  Carpal tunnel syndrome on right (354 0) (G56 01)   7  Dyshidrotic eczema (705 81) (L30 1)   8  Dyslipidemia (272 4) (E78 5)   9  Eczema (692 9) (L30 9)   10  Exposure to potentially hazardous body fluids (V15 85) (Z77 21)   11  Generalized obesity (278 00) (E66 9)   12  Herpes zoster (053 9) (B02 9)   13  History of thyroid nodule (V12 29) (Z86 39)   14  Large breasts (611 1) (N62)   15  Midline thoracic back pain (724 1) (M54 6)   16  Morbid obesity (278 01) (E66 01)   17  Oligomenorrhea (626 1) (N91 5)   18  Paresthesia of arm (782 0) (R20 2)   19  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)   20  Preoperative cardiovascular examination (V72 81) (Z01 810)   21  Pre-operative laboratory examination (V72 63) (Z01 812)   22  Screening for breast cancer (V76 10) (Z12 39)   23  Seborrheic keratosis (702 19) (L82 1)   24  Segmental and somatic dysfunction of cervical region (739 1) (M99 01)   25  Somatic dysfunction of spine, thoracic (739 2) (M99 02)   26  Tendonitis, Achilles (726 71) (M76 60)   27  Thoracic myofascial strain (847 1) (S29 019A)   28  Tingling (782 0) (R20 2)   29  Trigger middle finger of right hand (727 03) (M65 331)    Past Medical History    1  Acute conjunctivitis (372 00) (H10 30)   2  Acute frontal sinusitis (461 1) (J01 10)   3  History of Bacterial vaginosis (616 10,041 9) (N76 0,B96 89)   4  History of Depression (311) (F32 9)   5  History of Encounter for consultation (V65 9) (Z71 9)   6  History of Encounter for screening mammogram for breast cancer (V76 12) (Z12 31)   7  History of Generalized obesity (278 00) (E66 9)   8  History of Heel pain (729 5) (M79 673)   9  History of acute sinusitis (V12 69) (Z87 09)   10  History of carpal tunnel syndrome (V12 49) (Z86 69)   11  History of viral warts (V12 09) (Z86 19)   12  History of Influenza vaccine needed (V04 81) (Z23)   13   History of Itching in the vaginal area (698 1) (L29 8)   14  History of Pain, upper back (724 5) (M54 9)   15  History of Palpitations (785 1) (R00 2)   16  History of Positive PPD (795 51) (R76 11)   17  History of Tendonitis, Achilles (726 71) (M76 60)   18  History of Visit for routine gyn exam (V72 31) (Z01 419)    Surgical History    1  History of  Section   2  History of Neuroplasty Decompression Median Nerve At Carpal Tunnel   3  History of Nose Surgery   4  History of Tonsillectomy   5  History of Tubal Ligation    Family History  Mother    1  Family history of Diabetes Mellitus (V18 0)   2  Family history of Heart Disease (V17 49)   3  Family history of Stroke Syndrome (V17 1)  Father    4  Family history of Heart Disease (V17 49)   5  Family history of Hypertension (V17 49)   6  Family history of Renal Disease  Maternal Grandmother    7  Family history of Stroke Syndrome (V17 1)  Maternal Grandfather    8  Family history of Alzheimer Disease  Maternal Aunt    9  Family history of Arthritis (V17 7)   10  Family history of Diabetes Mellitus (V18 0)   11  Family history of Hypertension (V17 49)    Social History    · History of Being A Social Drinker   · Former smoker (V15 82) (U49 183)   · No alcohol use   · No drug use   · Occupation:    Current Meds   1  Daily Multiple Vitamins Oral Tablet; Therapy: (Recorded:2016) to Recorded   2  Gabapentin 300 MG Oral Capsule; For the first 3 days, 300mg po qam and 900mg po   qhs, then may increase to 600mg po qam and 900mg po qhs for next 3 days, then   900mg po BID; Therapy: 50JNC2497 to (Last Rx:26Glq6257)  Requested for: 19NJU4390 Ordered   3  Isabell Root 550 MG Oral Capsule; Therapy: (Recorded:30Zku9922) to Recorded   4  Naproxen 500 MG Oral Tablet; TAKE 1 TABLET EVERY 12 HOURS DAILY; Therapy: 02KSB7787 to (Evaluate:56Jgg3709)  Requested for: 98PWB7557; Last   Rx:69Ntt0609 Ordered   5  Turmeric 500 MG Oral Capsule; TAKE 2 CAPSULE Twice daily;    Therapy: (Recorded:2017) to Recorded    Allergies    1  No Known Drug Allergies    Vitals  Signs   Recorded: 83XCR1888 02:43PM   Weight: 215 lb 11 2 oz  BMI Calculated: 40 76  BSA Calculated: 1 95    Future Appointments    Date/Time Provider Specialty Site   06/16/2017 08:00 AM ADELAIDA Padilla  [address] [place] [address]   06/27/2017 02:50 PM ADELAIDA Padilla  Orthopedic Surgery [facility]   08/14/2017 10:30 AM ADELAIDA Gross  Bariatric Medicine [facility]   03/01/2018 11:00 AM ADELAIDA Reina   Electronically signed by : DANNIE Rodgers RD; May 11 2017  2:44PM EST                       (Author)    Electronically signed by :  ADELAIDA Piedra ; May 25 2017  2:04PM EST

## 2018-01-13 NOTE — PROGRESS NOTES
History of Present Illness  Bariatric MNT [facility] Screening Preop [facility]:     She was on time  the appointment lasted: 45 minutes  Her surgeon is Dr Chio Guerra  The patient was present at the session  The diagnosis/reason for the appointment is: She has Grade III Obesity with a BMI of 40 2  She has the following comorbidities: Carpal tunnel syndrome, , Tubal Ligation  Labs: Susana Cutting She was reminded to have her labs drawn   She does not need to monitor her glucose  She does not have a meter to test her blood glucose  Exercise Frequency:  She does not exercise  Relationship to food: She is an emotional eater, is a stress eater, eats when she is bored and eats large portions  She knows the difference between being comfortably full and uncomfortably full and knows the difference between being stuffed and comfortably full  She felt/thought they felt her best at 165# pounds she last weighed this 15 years ago  She completed a food journal She drinks 6-8 cups of water daily She drinks 0 caffienated beverages daily Her motivators are:pt wants to improve health, energy level, and quality of life  Her obesity/being overweight is related to positive energy balance and is evidenced by: BMI=40 2, pt reports little physical activity  Knowledge Deficit Prior to Education  She is new to the diet  Barriers to education:  She has no barriers to education  Medical Nutrition Therapy Intervention: Individualized Meal Plan, Daily Food /Exercise Diary, Lifestyle /Behavior Modification Techniques, Basic Pathophysiology of Obesity, Checklist of the Overview of Lesson Plans and Surgical changes to Stomach/GI  Area's Reviewed: Post - surgery goal weight, Lifestyle Nichelle Bolaños Modification Techniques, [employer] in [place] and Pre- surgery goals /rules  Brief Review of Vitamins, diet progression for post-op and Pathophysiology of Obesity  Her comprehension of the presented material was good  Her receptivity to the presented material was good  Her motivation was good  Provided: Nutrition Guidelines for Gastric Surgery, Bariatric Program Pre- Surgery Nutrition and Goals  Goals: Her set goal for physical activity is walk , for 30-60 minutes, 5 days a week  Review [employer] in [place]   Post Surgery: She will adhere to the diet progression: remain hydrated, consume adequate protein; and take vitamins as outlined in guidelines  Patient is a 40year old who is here for nutritional evaluation for weight loss surgery  I reviewed co-morbidities and medications with patient  She first recalls having problems with weight gain at the age of childhood  She has dieted in the past with variable success but would regain the weight back  (Refer to diet history for details)  For her personal pre-op goals she will: eliminate between-meal snacking  She was not interested in working on a specific number of calories, but plans to food journal to track her intake  She was instructed on the importance of consistent vitamin and mineral intake after her surgery to prevent deficiencies  She currently takes a hair/skin/nail multivitamin, 500 mg Turmeric, and 550 mg Isabell daily  Reviewed importance of support after surgery and discussed the Location, [employer], pep rally and support group  Patient states adequate knowledge of nutrition, exercise and behavior modification required for long term success  Pt  is recommended for surgery and is aware to practice lifestyle modification, complete all six lesson plans in bariatric manual, and complete two-week total liquid diet to lose weight prior to surgery  Patient is aware that she has 6 months of weigh-ins required by her insurance  Recommendations: She was provided contact information for any questions  She is recommended for surgery  ~He/She needs additional education  She states adequate knowledge of nutrition, exercise, and behavior modification   She will attend a Team Meeting approximately 2-3 weeks prior to surgery  Active Problems    1  Ankle pain (719 47) (M25 579)   2  Asymptomatic varicose veins, unspecified laterality (454 9) (I83 90)   3  Bacterial vaginosis (616 10,041 9) (N76 0,B96 89)   4  Bilateral carpal tunnel syndrome (354 0) (G56 03)   5  Carpal tunnel syndrome on left (354 0) (G56 02)   6  Carpal tunnel syndrome on right (354 0) (G56 01)   7  Dyshidrotic eczema (705 81) (L30 1)   8  Eczema (692 9) (L30 9)   9  Encounter for screening mammogram for breast cancer (V76 12) (Z12 31)   10  Exposure to potentially hazardous body fluids (V15 85) (Z77 21)   11  Generalized obesity (278 00) (E66 9)   12  Herpes zoster (053 9) (B02 9)   13  History of thyroid nodule (V12 29) (Z86 39)   14  Influenza vaccine needed (V04 81) (Z23)   15  Itching in the vaginal area (698 1) (L29 8)   16  Large breasts (611 1) (N62)   17  Midline thoracic back pain (724 1) (M54 6)   18  Morbid obesity (278 01) (E66 01)   19  Oligomenorrhea (626 1) (N91 5)   20  Paresthesia of arm (782 0) (R20 2)   21  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)   22  Screening for breast cancer (V76 10) (Z12 39)   23  Seborrheic keratosis (702 19) (L82 1)   24  Segmental and somatic dysfunction of cervical region (739 1) (M99 01)   25  Somatic dysfunction of spine, thoracic (739 2) (M99 02)   26  Tendonitis, Achilles (726 71) (M76 60)   27  Tendonitis, Achilles (726 71) (M76 60)   28  Thoracic myofascial strain (847 1) (S29 019A)   29  Tingling (782 0) (R20 2)   30  Trigger middle finger of right hand (727 03) (M65 331)   31  Viral warts (078 10) (B07 9)   32  Visit for routine gyn exam (V72 31) (Z01 419)    Past Medical History    1  Acute conjunctivitis (372 00) (H10 30)   2  Acute frontal sinusitis (461 1) (J01 10)   3  History of Depression (311) (F32 9)   4  History of Generalized obesity (278 00) (E66 9)   5  History of Heel pain (729 5) (M79 673)   6  History of carpal tunnel syndrome (V12 49) (Z86 69)   7  History of Pain, upper back (724 5) (M54 9)   8  History of Palpitations (785 1) (R00 2)   9  History of Positive PPD (795 51) (R76 11)    Surgical History    1  History of  Section   2  History of Neuroplasty Decompression Median Nerve At Carpal Tunnel   3  History of Nose Surgery   4  History of Tonsillectomy   5  History of Tubal Ligation    Family History  Mother    1  Family history of Diabetes Mellitus (V18 0)   2  Family history of Heart Disease (V17 49)   3  Family history of Stroke Syndrome (V17 1)  Father    4  Family history of Heart Disease (V17 49)   5  Family history of Hypertension (V17 49)   6  Family history of Renal Disease  Maternal Grandmother    7  Family history of Stroke Syndrome (V17 1)  Maternal Grandfather    8  Family history of Alzheimer Disease  Maternal Aunt    9  Family history of Arthritis (V17 7)   10  Family history of Diabetes Mellitus (V18 0)   11  Family history of Hypertension (V17 49)    Social History    · History of Being A Social Drinker   · Former smoker (V15 82) (Y92 804)   · No alcohol use   · No drug use   · Occupation:    Current Meds   1  Daily Multiple Vitamins Oral Tablet; Therapy: (Recorded:18Osc9307) to Recorded   2  Gabapentin 300 MG Oral Capsule; For the first 3 days, 300mg po qam and 900mg po   qhs, then may increase to 600mg po qam and 900mg po qhs for next 3 days, then   900mg po BID; Therapy: 74MCS8383 to (Last Rx:45Ezn7457)  Requested for: 29Rqa2715 Ordered   3  Ginger Root 550 MG Oral Capsule; Therapy: (Recorded:87Ocg1484) to Recorded   4  Hydrocortisone 1 % External Cream; APPLY SPARINGLY TO AFFECTED AREA(S) TWICE   DAILY; Therapy: 58OWJ0306 to (Last Rx:24Zxb0787)  Requested for: 98ZUO7707 Ordered   5  Naproxen 500 MG Oral Tablet; TAKE 1 TABLET EVERY 12 HOURS DAILY; Therapy: 40MFL4169 to (Evaluate:49Gmh9184)  Requested for: 55WEN7392; Last   Rx:05Ldx8211 Ordered   6   TraMADol HCl - 50 MG Oral Tablet; take 1 tablet twice a day as needed; Therapy: 22JTZ5104 to (Evaluate:03Mar2017)  Requested for: 32HSY1893; Last   Rx:84Mxi0752 Ordered   7  Triamcinolone Acetonide 0 5 % External Cream; APPLY 2-3 TIMES DAILY TO AFFECTED   AREA(S); Therapy: 36Tjb2240 to (Last Rx:04Ffk7954)  Requested for: 72Jsf8669 Ordered   8  Turmeric 500 MG Oral Capsule; Therapy: (Recorded:39Bdf9286) to Recorded    Allergies    1  No Known Drug Allergies    Vitals  Signs   Recorded: 39EUK6494 10:58AM   Height: 5 ft 1 in  Weight: 212 lb 8 oz  BMI Calculated: 40 15  BSA Calculated: 1 94    Future Appointments    Date/Time Provider Specialty Site   03/01/2017 09:50 AM ADELAIDA Shaffer  [address] [place] [address]   03/16/2017 10:15 ADELAIDA Ruiz  General Surgery [facility]   02/16/2017 09:10 AM ADELAIDA Jeong  Orthopedic Surgery [facility] [place]   04/13/2017 10:00 AM ADELAIDA Lai  Bariatric Medicine [facility]   02/15/2017 02:00 PM Maico Daly DO Family [address]   03/03/2017 10:30 AM Ben Smiley DO Family [address]     Signatures   Electronically signed by : DANNIE BocanegraRD; Feb 13 2017 10:58AM EST                       (Author)    Electronically signed by :  ADELAIDA Peterson ; Mar 16 2017 10:54AM EST

## 2018-01-13 NOTE — PROGRESS NOTES
Message  Outreach phone call; no response but left message  How is patient coming along with pre-op weight loss, protein drink? Any ? or concerns please call the office  [place]      Active Problems    1  Ankle pain (719 47) (M25 579)   2  Asymptomatic varicose veins, unspecified laterality (454 9) (I83 90)   3  Bilateral carpal tunnel syndrome (354 0) (G56 03)   4  Bradycardia, pre-operative cardiovascular examination (S52 07,346  89) (Z01 810,R00 1)   5  Bullae (709 8) (R23 8)   6  Carpal tunnel syndrome on left (354 0) (G56 02)   7  Carpal tunnel syndrome on right (354 0) (G56 01)   8  Dyshidrotic eczema (705 81) (L30 1)   9  Dyslipidemia (272 4) (E78 5)   10  Eczema (692 9) (L30 9)   11  Exposure to potentially hazardous body fluids (V15 85) (Z77 21)   12  Generalized obesity (278 00) (E66 9)   13  Herpes zoster (053 9) (B02 9)   14  History of thyroid nodule (V12 29) (Z86 39)   15  Large breasts (611 1) (N62)   16  Midline thoracic back pain (724 1) (M54 6)   17  Morbid obesity (278 01) (E66 01)   18  Oligomenorrhea (626 1) (N91 5)   19  Onychomycosis (110 1) (B35 1)   20  Paresthesia of arm (782 0) (R20 2)   21  Perimenopause (627 2) (N95 1)   22  Plantar fasciitis (728 71) (M72 2)   23  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)   24  Preoperative cardiovascular examination (V72 81) (Z01 810)   25  Pre-operative laboratory examination (V72 63) (Z01 812)   26  Screening for breast cancer (V76 10) (Z12 31)   27  Seborrheic keratosis (702 19) (L82 1)   28  Segmental and somatic dysfunction of cervical region (739 1) (M99 01)   29  Somatic dysfunction of spine, thoracic (739 2) (M99 02)   30  Tendonitis, Achilles (726 71) (M76 60)   31  Tendonitis, Achilles (726 71) (M76 60)   32  Thoracic myofascial strain (847 1) (S29 019A)   33  Tingling (782 0) (R20 2)   34  Trigger middle finger of right hand (727 03) (M65 331)    Current Meds   1  Daily Multiple Vitamins Oral Tablet;    Therapy: (Recorded:19Oct2016) to Recorded   2  Ginger Root 550 MG Oral Capsule; Therapy: (Recorded:74Fsn6918) to Recorded   3  Naproxen 500 MG Oral Tablet; TAKE 1 TABLET EVERY 12 HOURS DAILY; Therapy: 09QEE0804 to (Evaluate:19Oct2017)  Requested for: 19Sep2017; Last   Rx:19Sep2017 Ordered   4  Omeprazole 20 MG Oral Capsule Delayed Release; TAKE 1 CAPSULE DAILY EVERY   MORNING BEFORE BREAKFAST; Therapy: 11Yzp0755 to (Evaluate:07Oct2018); Last Rx:12Oct2017; Status: ACTIVE -   Retrospective By Protocol Authorization Ordered   5  Oxycodone-Acetaminophen 5-325 MG Oral Tablet (Percocet); TAKE 1 TABLET EVERY 4   TO 6 HOURS AS NEEDED FOR PAIN;   Therapy: 24GXA2145 to (Evaluate:14Oct2017); Last Rx:09Oct2017; Status: ACTIVE -   Retrospective By Protocol Authorization Ordered   6  Turmeric 500 MG Oral Capsule; TAKE 2 CAPSULE Twice daily; Therapy: (Recorded:06Apr2017) to Recorded    Allergies    1   No Known Drug Allergies    Signatures   Electronically signed by : ALEXA Nolan; Oct 30 2017  2:34PM EST                       (Author)

## 2018-01-13 NOTE — PROGRESS NOTES
Message  "Ed Mcburney 1970  Dr Kareen Baltazar would like her to have her next 3 weight checks with you, she is scheduled for her 6/6 with Dr Kareen Baltazar in August    She would schedule her 3/6 in May, 4/6 in June , and 5/6 in July "    Called pt, left voicemail message with contact # and office hours requesting return call to schedule  Active Problems    1  Ankle pain (719 47) (M25 579)   2  Asymptomatic varicose veins, unspecified laterality (454 9) (I83 90)   3  Bilateral carpal tunnel syndrome (354 0) (G56 03)   4  Bradycardia, pre-operative cardiovascular examination (X49 74,595  89) (Z01 810,R00 1)   5  Carpal tunnel syndrome on left (354 0) (G56 02)   6  Carpal tunnel syndrome on right (354 0) (G56 01)   7  Dyshidrotic eczema (705 81) (L30 1)   8  Dyslipidemia (272 4) (E78 5)   9  Eczema (692 9) (L30 9)   10  Exposure to potentially hazardous body fluids (V15 85) (Z77 21)   11  Generalized obesity (278 00) (E66 9)   12  Herpes zoster (053 9) (B02 9)   13  History of thyroid nodule (V12 29) (Z86 39)   14  Large breasts (611 1) (N62)   15  Midline thoracic back pain (724 1) (M54 6)   16  Morbid obesity (278 01) (E66 01)   17  Oligomenorrhea (626 1) (N91 5)   18  Paresthesia of arm (782 0) (R20 2)   19  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)   20  Preoperative cardiovascular examination (V72 81) (Z01 810)   21  Pre-operative laboratory examination (V72 63) (Z01 812)   22  Screening for breast cancer (V76 10) (Z12 39)   23  Seborrheic keratosis (702 19) (L82 1)   24  Segmental and somatic dysfunction of cervical region (739 1) (M99 01)   25  Somatic dysfunction of spine, thoracic (739 2) (M99 02)   26  Tendonitis, Achilles (726 71) (M76 60)   27  Thoracic myofascial strain (847 1) (S29 019A)   28  Tingling (782 0) (R20 2)   29  Trigger middle finger of right hand (727 03) (M65 331)    Current Meds   1  Daily Multiple Vitamins Oral Tablet; Therapy: (Recorded:19Oct2016) to Recorded   2   Gabapentin 300 MG Oral Capsule; For the first 3 days, 300mg po qam and 900mg po   qhs, then may increase to 600mg po qam and 900mg po qhs for next 3 days, then   900mg po BID; Therapy: 36TPD5456 to (Last Rx:72Yih9006)  Requested for: 02CTF4438 Ordered   3  Isabell Root 550 MG Oral Capsule; Therapy: (Recorded:40Lre3217) to Recorded   4  Naproxen 500 MG Oral Tablet; TAKE 1 TABLET EVERY 12 HOURS DAILY; Therapy: 12RWQ6433 to (Evaluate:94Czr1685)  Requested for: 06DGT3759; Last   Rx:96Chj7850 Ordered   5  Turmeric 500 MG Oral Capsule; TAKE 2 CAPSULE Twice daily; Therapy: (Recorded:06Apr2017) to Recorded    Allergies    1   No Known Drug Allergies    Signatures   Electronically signed by : DANNIE López,KEVIN; Apr 14 2017 11:08AM EST                       (Author)

## 2018-01-14 VITALS
RESPIRATION RATE: 18 BRPM | HEART RATE: 80 BPM | BODY MASS INDEX: 39.74 KG/M2 | HEIGHT: 61 IN | SYSTOLIC BLOOD PRESSURE: 112 MMHG | TEMPERATURE: 97 F | WEIGHT: 210.5 LBS | DIASTOLIC BLOOD PRESSURE: 64 MMHG

## 2018-01-14 VITALS
HEART RATE: 65 BPM | WEIGHT: 213.13 LBS | HEIGHT: 61 IN | DIASTOLIC BLOOD PRESSURE: 81 MMHG | BODY MASS INDEX: 40.24 KG/M2 | SYSTOLIC BLOOD PRESSURE: 120 MMHG

## 2018-01-14 VITALS
SYSTOLIC BLOOD PRESSURE: 122 MMHG | RESPIRATION RATE: 14 BRPM | HEIGHT: 62 IN | HEART RATE: 74 BPM | DIASTOLIC BLOOD PRESSURE: 70 MMHG | WEIGHT: 216.25 LBS | TEMPERATURE: 96.7 F | BODY MASS INDEX: 39.79 KG/M2

## 2018-01-14 NOTE — PROGRESS NOTES
Assessment    1  Encounter for preventive health examination (V70 0) (Z00 00)   2  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)    Plan  Positive QuantiFERON-TB Gold test    · * XR CHEST PA & LATERAL; Status:Resulted - Requires Verification;   Done: 70LTU8215  02:42PM    Discussion/Summary    Sonny Shen is a 40year old woman who presents for work physical      Health Maintenance  - Pt in good health, no acute complaints  - Needs CXR to evaluate TB status as pt has previous (+) Quantiferon Gold (previously worked as a respiratory therapist)   - Will mail physical exam form to pt once CXR has resulted     Pt has also been seeing Dr Mary Knox  Will request records  Suggested pt discuss long-term weight loss plan at her next appointment with him as she has been on Phentermine for approximately one year  Follow up in one year, or prior as needed  Possible side effects of new medications were reviewed with the patient/guardian today  The patient was counseled regarding instructions for management  Self Referrals: Yes Dr Mary Knox      Chief Complaint  Work Physical      History of Present Illness  HPI: Sonny Shen is a 40year old woman who presents for work physical  Denies any chronic medical issues, and states she is feeling well without any acute medical concerns  Review of Systems    Respiratory: Denies cough, shortness of breath, difficulty breathing  Active Problems    1  Ankle pain (719 47) (M25 579)   2  Bacterial vaginosis (616 10,041 9) (N76 0,B96 89)   3  Carpal tunnel syndrome (354 0) (G56 00)   4  Exposure to potentially hazardous body fluids (V15 85) (Z77 21)   5  Itching in the vaginal area (698 1) (L29 8)   6  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)   7  Screening for breast cancer (V76 10) (Z12 39)   8  Seborrheic keratosis (702 19) (L82 1)   9  Tendonitis, Achilles (726 71) (M76 60)   10  Tingling (782 0) (R20 2)   11   Trigger middle finger of right hand (727 03) (M65 331)   12  Viral warts (078 10) (B07 9)   13  Visit for routine gyn exam (V72 31) (Z01 419)    Past Medical History    · Acute conjunctivitis (372 00) (H10 30)   · Acute frontal sinusitis (461 1) (J01 10)   · History of Depression (311) (F32 9)   · History of Heel pain (729 5) (M79 673)   · History of Pain, upper back (724 5) (M54 9)   · History of Palpitations (785 1) (R00 2)   · History of Positive PPD (795 51) (R76 11)    Surgical History    · History of  Section   · History of Nose Surgery   · History of Tonsillectomy    Family History  Mother    · Family history of Diabetes Mellitus (V18 0)   · Family history of Heart Disease (V17 49)   · Family history of Stroke Syndrome (V17 1)  Father    · Family history of Heart Disease (V17 49)   · Family history of Hypertension (V17 49)   · Family history of Renal Disease  Maternal Grandmother    · Family history of Stroke Syndrome (V17 1)  Maternal Grandfather    · Family history of Alzheimer Disease  Maternal Aunt    · Family history of Arthritis (V17 7)   · Family history of Diabetes Mellitus (V18 0)   · Family history of Hypertension (V17 49)    Social History    · History of Being A Social Drinker   · Former smoker (V15 82) (G58 417)   · No alcohol use   · No drug use   · Occupation:   · 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 [employer]    Current Meds   1  Fluticasone Propionate 50 MCG/ACT Nasal Suspension; 1 spray in ea nostril bid; Therapy: 40DFJ9917 to (Last Rx:51Ycg7496)  Requested for: 52BUS3691 Ordered   2  Phentermine HCl TABS; Therapy: (Recorded:16Eja2023) to Recorded   3  Tobramycin 0 3 % Ophthalmic Solution; INSTILL 1 DROP INTO AFFECTED EYE(S) 4   TIMES DAILY; Therapy: 15SYN7639 to (Last Rx:90Qgc7926)  Requested for: 78BEN4721 Ordered   4  Trokendi XR CP24;   Therapy: (Recorded:75Ojo6720) to Recorded    Allergies    1   No Known Drug Allergies    Vitals   Recorded: 47TYY0970 76:60UA   Systolic 920   Diastolic 78   Heart Rate 72   Respiration 16   Temperature 97 3 F   Pain Scale 0   Height 5 ft 2 in   Weight 180 lb 6 oz   BMI Calculated 32 99   BSA Calculated 1 83     Physical Exam    Constitutional No acute distress  Head and Face NC/AT  Eyes Pupils equal and reactive to light  Ears, Nose, Mouth, and Throat TM (+) light reflex, non-bulging, non-erythematous bilaterally  Pharynx non-erythematous  Pulmonary Fair respiratory effort  Lungs clear to ausculation bilaterally  Cardiovascular +S1 +S2, regular rate and rhythm, no murmurs appreciated  Abdomen Soft, non-distended, non-tender to palpation, normal active bowel sounds in LLQ  Musculoskeletal 5/5 strength UE and LE bilaterally  Neurologic CN II-XII intact  Psychiatric Alert and oriented  Results/Data  * XR CHEST PA & LATERAL 51VFV1212 02:42PM Eddi Main Order Number: [id number]     Test Name Result Flag Reference   XR CHEST PA & LATERAL (Report)     CHEST - DUAL ENERGY     INDICATION: response to cell mediated gamma interferon atigen without active tuberculosis  COMPARISON: Chest x-ray from 10/28/2014  VIEWS: PA (including soft tissue/bone algorithms) and lateral projections; 4 images     FINDINGS:        Cardiomediastinal silhouette appears unremarkable  The lungs are clear  No pneumothorax or pleural effusion  Visualized osseous structures appear within normal limits for the patient's age  IMPRESSION:     Normal examination  Workstation performed: [id number]     Signed by:   Ana Moralez MD   8/11/16       Attending Note  Attending Note: Attending Note: I discussed the case with the Resident and reviewed the Resident's note, I supervised the Resident and I agree with the Resident management plan as it was presented to me  Level of Participation: I was present in clinic and examined the patient  Comments/Additional Findings: work physical: normal  I agree with the Resident's note        Signatures   Electronically signed by Raya Obrien DO; Aug 11 2016  9:26AM EST                       (Author)    Electronically signed by : ADELAIDA Hamm ; Aug 11 2016  4:42PM EST                       (Author)

## 2018-01-15 VITALS — BODY MASS INDEX: 40.76 KG/M2 | WEIGHT: 215.7 LBS

## 2018-01-15 VITALS
DIASTOLIC BLOOD PRESSURE: 70 MMHG | RESPIRATION RATE: 16 BRPM | TEMPERATURE: 97.4 F | SYSTOLIC BLOOD PRESSURE: 122 MMHG | WEIGHT: 203 LBS | HEART RATE: 80 BPM | BODY MASS INDEX: 37.13 KG/M2

## 2018-01-15 NOTE — PROGRESS NOTES
History of Present Illness  Bariatric Behavioral Health Evaluation [facility]:   She is here today because: Increase health, increase mobility, reduce chronic pain, prevent family diseases  She is seeking a Bariatric surgery evaluation  She researched this option for: 2 years  She realizes the post-op requirements has community contacts with bariatric surgery   Her Psychiatric/Psychological diagnosis: She does not have an outpatient counselor  She does not have a Psychiatrist    She has not had Inpatient Treatment  Family Constellation: Family Constellation: Mother: hypertension, obesity, , diabetes, heart failure  Father: , obesity, hypertension, heart failure  Siblings: n/a  Spouse: significant other--3 years--good health  Children: 3) good health  She lives withher significant other and children  Domestic Violence: has not happened  Abuse History: (denies childhood trauma)   Additional Comments: health, weight, chronic pain  Physical/psychological assessment Appearance: appropriate   Sociability: average  Affect: appropriate  Mood: calm  Thought Process: coherent  Speech: normal  Content: no impairment  The patient was oriented to person, oriented to place, oriented to time and normal memory   normal attention span  no decreased concentration ability  normal judgment  Her emotional insight was: fair  Her intellectual insight was: fair  Risk assessment: The patient is currently asymptomatic  No associated symptoms are reported  Sexual history: She is not pregnant  She does not have a history of STD's  Recommendations: She is recommended for surgery  Risk of Harm to Self or Others: The following are demographic risk factors associated with harm to self: , [place], or   (n/a)  Recent Specific Risk Factors: The patient is currently asymptomatic  No associated symptoms are reported     Access to Weapons:   She has access to the following weapons: denies access to weappons   Summary and Recommendations:   Low: No thoughts or occasional thoughts of suicide, but no intent or actions  Self inflicted scratches, abrasions, or other self- injurious of behavior where medical attention is typically not warranted  No elements of homicidality or occasional thoughts but no plan, intent, or actions  Active Problems    1  Ankle pain (719 47) (M25 579)   2  Asymptomatic varicose veins, unspecified laterality (454 9) (I83 90)   3  Bacterial vaginosis (616 10,041 9) (N76 0,B96 89)   4  Bilateral carpal tunnel syndrome (354 0) (G56 03)   5  Carpal tunnel syndrome on left (354 0) (G56 02)   6  Carpal tunnel syndrome on right (354 0) (G56 01)   7  Dyshidrotic eczema (705 81) (L30 1)   8  Eczema (692 9) (L30 9)   9  Encounter for screening mammogram for breast cancer (V76 12) (Z12 31)   10  Exposure to potentially hazardous body fluids (V15 85) (Z77 21)   11  Generalized obesity (278 00) (E66 9)   12  Herpes zoster (053 9) (B02 9)   13  History of thyroid nodule (V12 29) (Z86 39)   14  Influenza vaccine needed (V04 81) (Z23)   15  Itching in the vaginal area (698 1) (L29 8)   16  Large breasts (611 1) (N62)   17  Midline thoracic back pain (724 1) (M54 6)   18  Morbid obesity (278 01) (E66 01)   19  Oligomenorrhea (626 1) (N91 5)   20  Paresthesia of arm (782 0) (R20 2)   21  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)   22  Screening for breast cancer (V76 10) (Z12 39)   23  Seborrheic keratosis (702 19) (L82 1)   24  Segmental and somatic dysfunction of cervical region (739 1) (M99 01)   25  Somatic dysfunction of spine, thoracic (739 2) (M99 02)   26  Tendonitis, Achilles (726 71) (M76 60)   27  Tendonitis, Achilles (726 71) (M76 60)   28  Thoracic myofascial strain (847 1) (S29 019A)   29  Tingling (782 0) (R20 2)   30  Trigger middle finger of right hand (727 03) (M65 331)   31  Viral warts (078 10) (B07 9)   32   Visit for routine gyn exam (V72 31) (Z01 419)    Past Medical History    1  Acute conjunctivitis (372 00) (H10 30)   2  Acute frontal sinusitis (461 1) (J01 10)   3  History of Depression (311) (F32 9)   4  History of Generalized obesity (278 00) (E66 9)   5  History of Heel pain (729 5) (M79 673)   6  History of carpal tunnel syndrome (V12 49) (Z86 69)   7  History of Pain, upper back (724 5) (M54 9)   8  History of Palpitations (785 1) (R00 2)   9  History of Positive PPD (795 51) (R76 11)    Surgical History    1  History of  Section   2  History of Neuroplasty Decompression Median Nerve At Carpal Tunnel   3  History of Nose Surgery   4  History of Tonsillectomy   5  History of Tubal Ligation    Family History  Mother    1  Family history of Diabetes Mellitus (V18 0)   2  Family history of Heart Disease (V17 49)   3  Family history of Stroke Syndrome (V17 1)  Father    4  Family history of Heart Disease (V17 49)   5  Family history of Hypertension (V17 49)   6  Family history of Renal Disease  Maternal Grandmother    7  Family history of Stroke Syndrome (V17 1)  Maternal Grandfather    8  Family history of Alzheimer Disease  Maternal Aunt    9  Family history of Arthritis (V17 7)   10  Family history of Diabetes Mellitus (V18 0)   11  Family history of Hypertension (V17 49)    Social History    · History of Being A Social Drinker   · Former smoker (V15 82) (X63 983)   · No alcohol use   · No drug use   · Occupation:    Current Meds   1  Daily Multiple Vitamins Oral Tablet; Therapy: (Recorded:2016) to Recorded   2  Gabapentin 300 MG Oral Capsule; For the first 3 days, 300mg po qam and 900mg po   qhs, then may increase to 600mg po qam and 900mg po qhs for next 3 days, then   900mg po BID; Therapy: 80QDF4663 to (Last Rx:48Ytl7581)  Requested for: 70Xvt7617 Ordered   3  Isabell Root 550 MG Oral Capsule; Therapy: (Recorded:66Sgm7472) to Recorded   4  Hydrocortisone 1 % External Cream; APPLY SPARINGLY TO AFFECTED AREA(S) TWICE   DAILY;    Therapy: 85HZC5820 to (Last Rx:68Ipt5037)  Requested for: 68QVE6806 Ordered   5  Naproxen 500 MG Oral Tablet; TAKE 1 TABLET EVERY 12 HOURS DAILY; Therapy: 70NJK5099 to (Evaluate:52Dxb7820)  Requested for: 18QUO2396; Last   Rx:96Ayi3895 Ordered   6  TraMADol HCl - 50 MG Oral Tablet; take 1 tablet twice a day as needed; Therapy: 77YRA9390 to (Evaluate:03Mar2017)  Requested for: 76IQF5043; Last   Rx:63Agy3096 Ordered   7  Triamcinolone Acetonide 0 5 % External Cream; APPLY 2-3 TIMES DAILY TO AFFECTED   AREA(S); Therapy: 74Gkm5927 to (Last Rx:73Ifk4560)  Requested for: 84Fbr8450 Ordered   8  Turmeric 500 MG Oral Capsule; Therapy: (Recorded:00Bgt0725) to Recorded    Allergies    1  No Known Drug Allergies     Note   Note:   Patient denies Axis I diagnosis or treatment  Patient educated regarding the impact of nicotine and alcohol on the post bariatric surgery patient  Patient meets criteria for surgery at this program and is referred to physician  Future Appointments    Date/Time Provider Specialty Site   03/01/2017 09:50 AM ADELAIDA Baeza  [address] [place] [address]   03/16/2017 10:15 ADELAIDA Ambrocio  General Surgery [facility] [place]   02/16/2017 09:10 AM ADELAIDA Fuentes  Orthopedic Surgery [facility]   04/13/2017 10:00 AM ADELAIDA Mulligan  Bariatric Medicine [place] [address] [facility]   02/15/2017 02:00 PM Mildred Shepherd DO Family [address]   03/03/2017 10:30 AM Brandon Robertson DO Family [address]     Signatures   Electronically signed by : Adele Almonte, ANNIEWMSWMSHIRO,[facility]; Feb 13 2017 11:27AM EST                       (Author)    Electronically signed by :  ADELAIDA Weller ; Mar 16 2017 10:54AM EST

## 2018-01-15 NOTE — RESULT NOTES
Dear Jackeline De León,   Your test results have returned and are listed below:      Discussion/Summary  We have received the results from your blood work drawn 4/7/2017  Your results have some minor abnormalities, but nothing that is concerning  Your lipid panel was abnormal  A lipid profile, or lipid panel, is a blood test to check your lipid levels  Lipids are fats that cannot dissolve in blood  High lipid levels increase your risk for heart disease and a heart attack or stroke  It is recommended that your follow-up with your primary care physician  Weight loss and lifestyle changes that are seen with bariatric surgery can also help to improve these results  Please keep your regularly scheduled follow-up appointment  If you do not have a follow-up scheduled, please call the office to schedule a follow-up visit  If you have any questions, please don't hesitate to call the office  Sincerely,      Signatures   Electronically signed by : LAVINIA Anglin; May  9 2017  8:26AM EST                       (Author)    Electronically signed by :  ADELAIDA Meredith ; May 25 2017  2:04PM EST

## 2018-01-15 NOTE — MISCELLANEOUS
Message  Patient called to complaining of feeling as though she has "food stuck in her throat " She recently increased to a soft diet on Friday  I told her to go on a a liquid diet and to try warm teas and warm broth to help alleviate that feeling  She has vomited twice which why I recommended going on a liquid diet until Wednesday  Active Problems    1  Ankle pain (719 47) (M25 579)   2  Asymptomatic varicose veins, unspecified laterality (454 9) (I83 90)   3  Bilateral carpal tunnel syndrome (354 0) (G56 03)   4  Bradycardia, pre-operative cardiovascular examination (S11 94,644  89) (Z01 810,R00 1)   5  Bullae (709 8) (R23 8)   6  Carpal tunnel syndrome on left (354 0) (G56 02)   7  Carpal tunnel syndrome on right (354 0) (G56 01)   8  Dyshidrotic eczema (705 81) (L30 1)   9  Dyslipidemia (272 4) (E78 5)   10  Eczema (692 9) (L30 9)   11  Exposure to potentially hazardous body fluids (V15 85) (Z77 21)   12  Gastric bypass status for obesity (V45 86) (Z98 84)   13  Generalized obesity (278 00) (E66 9)   14  Herpes zoster (053 9) (B02 9)   15  History of thyroid nodule (V12 29) (Z86 39)   16  Influenza vaccine needed (V04 81) (Z23)   17  Large breasts (611 1) (N62)   18  Midline thoracic back pain (724 1) (M54 6)   19  Morbid obesity (278 01) (E66 01)   20  Nausea (787 02) (R11 0)   21  Oligomenorrhea (626 1) (N91 5)   22  Onychomycosis (110 1) (B35 1)   23  Paresthesia of arm (782 0) (R20 2)   24  Perimenopause (627 2) (N95 1)   25  Plantar fasciitis (728 71) (M72 2)   26  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)   27  Postgastrectomy malabsorption (579 3) (K91 2,Z90 3)   28  Preoperative cardiovascular examination (V72 81) (Z01 810)   29  Pre-operative laboratory examination (V72 63) (Z01 812)   30  Screening for breast cancer (V76 10) (Z12 31)   31  Seborrheic keratosis (702 19) (L82 1)   32  Segmental and somatic dysfunction of cervical region (739 1) (M99 01)   33   Somatic dysfunction of spine, thoracic (739 2) (M99 02)   34  Tendonitis, Achilles (726 71) (M76 60)   35  Tendonitis, Achilles (726 71) (M76 60)   36  Thoracic myofascial strain (847 1) (S29 019A)   37  Tingling (782 0) (R20 2)   38  Trigger middle finger of right hand (727 03) (M65 331)    Current Meds   1  Acetaminophen Extra Strength 500 MG/15ML Oral Liquid; SWALLOW 20 ML Every 8   hours PRN; Therapy: 44ACW6847 to (Evaluate:49Lkq6235)  Requested for: 2017; Last   Rx:2017 Ordered   2  Calcium 600 MG Oral Tablet; TAKE 1 TABLET 3 times daily; Therapy: 63ASY8918 to Recorded   3  Cyanocobalamin Powder; Therapy: 12EGB3039 to Recorded   4  Daily Multiple Vitamins Oral Tablet; Therapy: (Recorded:2016) to Recorded   5  Gabapentin 300 MG/6ML Oral Solution; 8mL po q AM and 8mL po qhs for 1 week and   then 4mLpo q AM and 4mL po qhs for 1 week, then stop medication; Therapy: 09RKK3073 to (Rosetta Apgar)  Requested for: 19DHH8772; Last   Rx:2017 Ordered   6  Melatonin 10 MG Oral Tablet; TAKE 1 TABLET Bedtime; Therapy: 74QCC9396 to Recorded   7  Metoclopramide HCl - 5 MG Oral Tablet (Reglan); TAKE 1 TABLET 4 TIMES DAILY AS   NEED FOR NAUSEA; Therapy: 03DSQ0200 to (Dian 3554)  Requested for: 29AJQ6472; Last   Rx:2017 Ordered   8  Omeprazole 20 MG Oral Capsule Delayed Release; TAKE 1 CAPSULE DAILY EVERY   MORNING BEFORE BREAKFAST; Therapy: 64Ynu6950 to (Evaluate:2018); Last Rx:2017 Ordered   9  Ondansetron 4 MG Oral Tablet Disintegrating (Zofran ODT); Take one tablet every 6   hours as needed for nausea; Therapy: 95TSJ7738-99RQB5201  Requested for: 91CXW3600; Last Rx:2017   Ordered   10  Turmeric 500 MG Oral Capsule; TAKE 2 CAPSULE Twice daily; Therapy: (Recorded:2017) to Recorded    Allergies    1   No Known Drug Allergies    Signatures   Electronically signed by : Beatriz Jara, ; 2017 11:26AM EST                       (Author)

## 2018-01-16 NOTE — PROGRESS NOTES
Active Problems    1  Ankle pain (719 47) (M25 579)   2  Asymptomatic varicose veins, unspecified laterality (454 9) (I83 90)   3  Bilateral carpal tunnel syndrome (354 0) (G56 03)   4  Bradycardia, pre-operative cardiovascular examination (E73 31,980  89) (Z01 810,R00 1)   5  Carpal tunnel syndrome on left (354 0) (G56 02)   6  Carpal tunnel syndrome on right (354 0) (G56 01)   7  Dyshidrotic eczema (705 81) (L30 1)   8  Dyslipidemia (272 4) (E78 5)   9  Eczema (692 9) (L30 9)   10  Exposure to potentially hazardous body fluids (V15 85) (Z77 21)   11  Generalized obesity (278 00) (E66 9)   12  Herpes zoster (053 9) (B02 9)   13  History of thyroid nodule (V12 29) (Z86 39)   14  Large breasts (611 1) (N62)   15  Midline thoracic back pain (724 1) (M54 6)   16  Morbid obesity (278 01) (E66 01)   17  Oligomenorrhea (626 1) (N91 5)   18  Paresthesia of arm (782 0) (R20 2)   19  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)   20  Preoperative cardiovascular examination (V72 81) (Z01 810)   21  Pre-operative laboratory examination (V72 63) (Z01 812)   22  Screening for breast cancer (V76 10) (Z12 39)   23  Seborrheic keratosis (702 19) (L82 1)   24  Segmental and somatic dysfunction of cervical region (739 1) (M99 01)   25  Somatic dysfunction of spine, thoracic (739 2) (M99 02)   26  Tendonitis, Achilles (726 71) (M76 60)   27  Thoracic myofascial strain (847 1) (S29 019A)   28  Tingling (782 0) (R20 2)   29  Trigger middle finger of right hand (727 03) (M65 331)    Current Meds   1  Daily Multiple Vitamins Oral Tablet; Therapy: (Recorded:19Oct2016) to Recorded   2  Gabapentin 300 MG Oral Capsule; For the first 3 days, 300mg po qam and 900mg po   qhs, then may increase to 600mg po qam and 900mg po qhs for next 3 days, then   900mg po BID; Therapy: 78UMB6577 to (Last Rx:43Nic4684)  Requested for: 67LNR1246 Ordered   3  Isabell Root 550 MG Oral Capsule; Therapy: (Recorded:13Feb2017) to Recorded   4   Naproxen 500 MG Oral Tablet; TAKE 1 TABLET EVERY 12 HOURS DAILY; Therapy: 23EPP8063 to (Evaluate:74Ufm0874)  Requested for: 76YRS1312; Last   Rx:05Uuw2315 Ordered   5  Turmeric 500 MG Oral Capsule; TAKE 2 CAPSULE Twice daily; Therapy: (Recorded:06Apr2017) to Recorded    Allergies    1   No Known Drug Allergies    Discussion/Summary    Pt returned phone call, scheduled:  3 of 6 for Thursday, 5/11/17 at 10:30am   4 of 6 for Friday, 6/16/17 at 10:30am   5 of 6 for Friday, 7/14/17 at 10:30am       Signatures   Electronically signed by : DANNIE Goodson,KEVIN; Apr 17 2017  1:34PM EST                       (Author)

## 2018-01-17 NOTE — MISCELLANEOUS
Message  Pt called today complaining of nausea  Spoke to Marquise Rubio and she told me to advise pt to increase PPI to BID  She gave me orders to send in zofran and told me to advise pt to use only if needed  Active Problems    1  Ankle pain (719 47) (M25 579)   2  Asymptomatic varicose veins, unspecified laterality (454 9) (I83 90)   3  Bilateral carpal tunnel syndrome (354 0) (G56 03)   4  Bradycardia, pre-operative cardiovascular examination (S10 78,844  89) (Z01 810,R00 1)   5  Bullae (709 8) (R23 8)   6  Carpal tunnel syndrome on left (354 0) (G56 02)   7  Carpal tunnel syndrome on right (354 0) (G56 01)   8  Dyshidrotic eczema (705 81) (L30 1)   9  Dyslipidemia (272 4) (E78 5)   10  Eczema (692 9) (L30 9)   11  Exposure to potentially hazardous body fluids (V15 85) (Z77 21)   12  Gastric bypass status for obesity (V45 86) (Z98 84)   13  Generalized obesity (278 00) (E66 9)   14  Herpes zoster (053 9) (B02 9)   15  History of thyroid nodule (V12 29) (Z86 39)   16  Large breasts (611 1) (N62)   17  Midline thoracic back pain (724 1) (M54 6)   18  Morbid obesity (278 01) (E66 01)   19  Nausea (787 02) (R11 0)   20  Oligomenorrhea (626 1) (N91 5)   21  Onychomycosis (110 1) (B35 1)   22  Paresthesia of arm (782 0) (R20 2)   23  Perimenopause (627 2) (N95 1)   24  Plantar fasciitis (728 71) (M72 2)   25  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)   26  Preoperative cardiovascular examination (V72 81) (Z01 810)   27  Pre-operative laboratory examination (V72 63) (Z01 812)   28  Screening for breast cancer (V76 10) (Z12 31)   29  Seborrheic keratosis (702 19) (L82 1)   30  Segmental and somatic dysfunction of cervical region (739 1) (M99 01)   31  Somatic dysfunction of spine, thoracic (739 2) (M99 02)   32  Tendonitis, Achilles (726 71) (M76 60)   33  Tendonitis, Achilles (726 71) (M76 60)   34  Thoracic myofascial strain (847 1) (S29 019A)   35  Tingling (782 0) (R20 2)   36   Trigger middle finger of right hand (727 03) (M65 331)    Current Meds   1  Acetaminophen Extra Strength 500 MG/15ML Oral Liquid; SWALLOW 20 ML Every 8   hours PRN; Therapy: 74RSD5801 to (Evaluate:53Osi1783)  Requested for: 31Oct2017; Last   Rx:31Oct2017 Ordered   2  Calcium 600 MG Oral Tablet; TAKE 1 TABLET 3 times daily; Therapy: 62YLU7071 to Recorded   3  Cyanocobalamin Powder; Therapy: 26VRY8395 to Recorded   4  Daily Multiple Vitamins Oral Tablet; Therapy: (Recorded:19Oct2016) to Recorded   5  Gabapentin 300 MG/6ML Oral Solution; Take 6mL by mouth in the morning and 18mL   by mouth at bedtime; Therapy: 52FTJ7773 to (Evaluate:39Sql7190)  Requested for: 31Oct2017; Last   Rx:31Oct2017 Ordered   6  Melatonin 10 MG Oral Tablet; TAKE 1 TABLET Bedtime; Therapy: 11XQU0268 to Recorded   7  Omeprazole 20 MG Oral Capsule Delayed Release; TAKE 1 CAPSULE DAILY EVERY   MORNING BEFORE BREAKFAST; Therapy: 93Mco5908 to (Evaluate:07Oct2018); Last Rx:12Oct2017 Ordered   8  Turmeric 500 MG Oral Capsule; TAKE 2 CAPSULE Twice daily; Therapy: (Recorded:06Apr2017) to Recorded    Allergies    1  No Known Drug Allergies    Plan  Nausea    · Ondansetron 4 MG Oral Tablet Disintegrating (Zofran ODT);  Take one tablet every 6  hours as needed for nausea    Signatures   Electronically signed by : Jessica Doyle LPN; Nov 13 4007  8:62OR EST                       (Author)

## 2018-01-18 NOTE — MISCELLANEOUS
Message  Return to work or school:   Pop Haynes is under my professional care  She was seen in my office on 2/17/16   She is able to return to work on  In 1-2 days              Signatures   Electronically signed by : Rosie Anne MD; Feb 17 2016 10:19AM EST                       (Author)

## 2018-01-18 NOTE — MISCELLANEOUS
Message  Return to work or school:   Yovany Koroma is under my professional care   She was seen in my office on 10/19/16             Signatures   Electronically signed by : Jayme Wright MD; Oct 19 2016  9:37AM EST                       (Author)

## 2018-01-22 VITALS
HEIGHT: 61 IN | WEIGHT: 213.5 LBS | DIASTOLIC BLOOD PRESSURE: 70 MMHG | SYSTOLIC BLOOD PRESSURE: 112 MMHG | BODY MASS INDEX: 40.31 KG/M2 | HEART RATE: 78 BPM | TEMPERATURE: 97 F | RESPIRATION RATE: 16 BRPM

## 2018-01-22 VITALS
SYSTOLIC BLOOD PRESSURE: 100 MMHG | BODY MASS INDEX: 39.54 KG/M2 | HEIGHT: 61 IN | DIASTOLIC BLOOD PRESSURE: 66 MMHG | HEART RATE: 72 BPM | TEMPERATURE: 98.1 F | WEIGHT: 209.44 LBS

## 2018-01-22 VITALS
TEMPERATURE: 96.8 F | DIASTOLIC BLOOD PRESSURE: 80 MMHG | HEIGHT: 61 IN | BODY MASS INDEX: 39.46 KG/M2 | WEIGHT: 209 LBS | RESPIRATION RATE: 18 BRPM | SYSTOLIC BLOOD PRESSURE: 120 MMHG | HEART RATE: 78 BPM

## 2018-01-22 VITALS
SYSTOLIC BLOOD PRESSURE: 116 MMHG | DIASTOLIC BLOOD PRESSURE: 70 MMHG | HEART RATE: 80 BPM | TEMPERATURE: 96.8 F | BODY MASS INDEX: 39.21 KG/M2 | WEIGHT: 207.5 LBS | RESPIRATION RATE: 16 BRPM

## 2018-01-22 VITALS
TEMPERATURE: 97.7 F | HEART RATE: 76 BPM | SYSTOLIC BLOOD PRESSURE: 110 MMHG | DIASTOLIC BLOOD PRESSURE: 80 MMHG | BODY MASS INDEX: 39.71 KG/M2 | WEIGHT: 210.32 LBS | HEIGHT: 61 IN

## 2018-01-22 VITALS — HEIGHT: 61 IN | WEIGHT: 179.5 LBS | BODY MASS INDEX: 33.89 KG/M2

## 2018-01-23 VITALS
HEART RATE: 72 BPM | TEMPERATURE: 97.8 F | SYSTOLIC BLOOD PRESSURE: 82 MMHG | WEIGHT: 177.69 LBS | DIASTOLIC BLOOD PRESSURE: 60 MMHG | HEIGHT: 61 IN | BODY MASS INDEX: 33.55 KG/M2

## 2018-01-23 NOTE — PROGRESS NOTES
Discussion/Summary  Discussion Summary:   Patient attended 5 week post op class  Reviewed diet progression, vitamin and mineral recommendations, 30/60 rules, volume of foods, protein supplements, hydration needs, antacid guidelines, recommendation to f/u with pcp concerning med adjustments, exercise guidelines, hair shedding, contraception guidelines, constipation prevention and treatment, alcohol avoidance and recommendations of when to call the office  Patient was also weighed and filled out form for program   Time was left for discussion and to answer questions  Active Problems    1  Ankle pain (719 47) (M25 579)   2  Asymptomatic varicose veins, unspecified laterality (454 9) (I83 90)   3  Bilateral carpal tunnel syndrome (354 0) (G56 03)   4  Bradycardia, pre-operative cardiovascular examination (G52 96,476  89) (Z01 810,R00 1)   5  Bullae (709 8) (R23 8)   6  Carpal tunnel syndrome on left (354 0) (G56 02)   7  Carpal tunnel syndrome on right (354 0) (G56 01)   8  Dyshidrotic eczema (705 81) (L30 1)   9  Dyslipidemia (272 4) (E78 5)   10  Eczema (692 9) (L30 9)   11  Exposure to potentially hazardous body fluids (V15 85) (Z77 21)   12  Gastric bypass status for obesity (V45 86) (Z98 84)   13  Generalized obesity (278 00) (E66 9)   14  Herpes zoster (053 9) (B02 9)   15  History of thyroid nodule (V12 29) (Z86 39)   16  Influenza vaccine needed (V04 81) (Z23)   17  Large breasts (611 1) (N62)   18  Midline thoracic back pain (724 1) (M54 6)   19  Morbid obesity (278 01) (E66 01)   20  Nausea (787 02) (R11 0)   21  Oligomenorrhea (626 1) (N91 5)   22  Onychomycosis (110 1) (B35 1)   23  Paresthesia of arm (782 0) (R20 2)   24  Perimenopause (627 2) (N95 1)   25  Plantar fasciitis (728 71) (M72 2)   26  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)   27  Postgastrectomy malabsorption (579 3) (K91 2,Z90 3)   28  Preoperative cardiovascular examination (V72 81) (Z01 810)   29   Pre-operative laboratory examination (V72 63) (Z01 812)   30  Screening for breast cancer (V76 10) (Z12 31)   31  Seborrheic keratosis (702 19) (L82 1)   32  Segmental and somatic dysfunction of cervical region (739 1) (M99 01)   33  Somatic dysfunction of spine, thoracic (739 2) (M99 02)   34  Tendonitis, Achilles (726 71) (M76 60)   35  Tendonitis, Achilles (726 71) (M76 60)   36  Thoracic myofascial strain (847 1) (S29 019A)   37  Tingling (782 0) (R20 2)   38  Trigger middle finger of right hand (727 03) (M65 331)    Past Medical History    1  Acute conjunctivitis (372 00) (H10 30)   2  Acute frontal sinusitis (461 1) (J01 10)   3  History of Bacterial vaginosis (616 10,041 9) (N76 0,B96 89)   4  History of Depression (311) (F32 9)   5  History of Encounter for consultation (V65 9) (Z71 9)   6  History of Encounter for screening mammogram for breast cancer (V76 12) (Z12 31)   7  History of Generalized obesity (278 00) (E66 9)   8  History of Heel pain (729 5) (M79 673)   9  History of acute sinusitis (V12 69) (Z87 09)   10  History of carpal tunnel syndrome (V12 49) (Z86 69)   11  History of viral warts (V12 09) (Z86 19)   12  History of Influenza vaccine needed (V04 81) (Z23)   13  History of Itching in the vaginal area (698 1) (L29 8)   14  History of Pain, upper back (724 5) (M54 9)   15  History of Palpitations (785 1) (R00 2)   16  History of Positive PPD (795 51) (R76 11)   17  History of Tendonitis, Achilles (726 71) (M76 60)   18  History of Visit for routine gyn exam (V72 31) (Z01 419)    Surgical History    1  History of  Section   2  History of Gastric Surgery For Morbid Obesity Bypass With Lauro-en-Y   3  History of Neuroplasty Decompression Median Nerve At Carpal Tunnel   4  History of Nose Surgery   5  History of Tonsillectomy   6  History of Tubal Ligation    Family History  Mother    1  Family history of Diabetes Mellitus (V18 0)   2  Family history of Heart Disease (V17 49)   3   Family history of Stroke Syndrome (V17 1)  Father    4  Family history of Heart Disease (V17 49)   5  Family history of Hypertension (V17 49)   6  Family history of Renal Disease  Maternal Grandmother    7  Family history of Stroke Syndrome (V17 1)  Maternal Grandfather    8  Family history of Alzheimer Disease  Maternal Aunt    9  Family history of Arthritis (V17 7)   10  Family history of Diabetes Mellitus (V18 0)   11  Family history of Hypertension (V17 49)    Social History    · History of Being A Social Drinker   · Former smoker (V15 82) (V24 820)   · No alcohol use   · No drug use   · Occupation:    Current Meds   1  Acetaminophen Extra Strength 500 MG/15ML Oral Liquid; SWALLOW 20 ML Every 8   hours PRN; Therapy: 43BCS0274 to (Evaluate:33Knz8636)  Requested for: 31Oct2017; Last   Rx:31Oct2017 Ordered   2  Calcium 600 MG Oral Tablet; TAKE 1 TABLET 3 times daily; Therapy: 94MVP9232 to Recorded   3  Cyanocobalamin Powder; Therapy: 56ZXS6268 to Recorded   4  Daily Multiple Vitamins Oral Tablet; Therapy: (Recorded:19Oct2016) to Recorded   5  Gabapentin 300 MG/6ML Oral Solution; 8mL po q AM and 8mL po qhs for 1 week and   then 4mLpo q AM and 4mL po qhs for 1 week, then stop medication; Therapy: 18FAN7804 to (Tiffany Ruiz)  Requested for: 92XFI8347; Last   Rx:22Bbg7993 Ordered   6  Melatonin 10 MG Oral Tablet; TAKE 1 TABLET Bedtime; Therapy: 41KDW0804 to Recorded   7  Omeprazole 20 MG Oral Capsule Delayed Release; TAKE 1 CAPSULE DAILY EVERY   MORNING BEFORE BREAKFAST; Therapy: 61Eeb4232 to (Evaluate:07Oct2018); Last Rx:12Oct2017 Ordered   8  Turmeric 500 MG Oral Capsule; TAKE 2 CAPSULE Twice daily; Therapy: (Recorded:06Apr2017) to Recorded    Allergies    1   No Known Drug Allergies    Vitals  Signs   Recorded: 92Otg3445 04:25PM   Height: 5 ft 1 in  Weight: 179 lb 8 oz  BMI Calculated: 33 92  BSA Calculated: 1 8    Future Appointments    Date/Time Provider Specialty Site   05/17/2018 01:00 PM ADELAIDA Landin  Plastic Surgery BODY EVOLUTION PLASTIC RECONS [facility]   02/16/2018 02:00 PM Judson Roberson [facility] [place] [address] [facility]   12/26/2017 10:40 AM Specialty Clinic, Podiatry  [address]   01/04/2018 05:20 PM Negrito Phan DO Family Medicine [facility] [place] [place] [facility]     Signatures   Electronically signed by : DANNIE Sims RD; Dec 20 2017  4:25PM EST                       (Author)    Electronically signed by :  ADELAIDA Rene ; Jan 4 2018 12:39PM EST

## 2018-01-23 NOTE — CONSULTS
Signatures   Electronically signed by : ADELAIDA Mcneil ; Dec 26 2017  7:51AM EST                       (Author)

## 2018-01-23 NOTE — PROGRESS NOTES
Assessment    1  Plantar fasciitis (728 71) (M72 2)    Plan    · Follow-up PRN Evaluation and Treatment  Follow-up  Status: Complete  Done:  85TLS0068 11:05AM    Discussion/Summary    55 y/o F rtc for plantar fasciitis  - pt seen/examined  - instructed pt to c/w stretching exercises at least 2x/day  - pt to c/w orthotic wear  - pt will rtc prn, as she does not want PT (she will do exercises at home) or an injection at this time as pain is improving  History of Present Illness  HPI: 55 y/o F rtc for b/l plantar fasciitis  Pt states that her pain is improving  She admits that she does not do her stretching exercises everyday because it is hard to remember  Pt has gotten the orthotics and is ambulating to clinic today with the orthotics + sneakers  pt states that she wears the orthotics every day  Pt states that her pain when does does have it, is still localized to the bottom of her heel  she denies n/v/f/c/sob      Review of Systems    Constitutional: as noted in HPI    ENT: as noted in HPI  Cardiovascular: as noted in HPI  Respiratory: as noted in HPI  Breasts: as noted in HPI  Gastrointestinal: as noted in HPI  ROS reviewed  Active Problems    1  Ankle pain (719 47) (M25 579)   2  Asymptomatic varicose veins, unspecified laterality (454 9) (I83 90)   3  Bilateral carpal tunnel syndrome (354 0) (G56 03)   4  Bradycardia, pre-operative cardiovascular examination (J34 45,133  89) (Z01 810,R00 1)   5  Bullae (709 8) (R23 8)   6  Carpal tunnel syndrome on left (354 0) (G56 02)   7  Carpal tunnel syndrome on right (354 0) (G56 01)   8  Dyshidrotic eczema (705 81) (L30 1)   9  Dyslipidemia (272 4) (E78 5)   10  Eczema (692 9) (L30 9)   11  Exposure to potentially hazardous body fluids (V15 85) (Z77 21)   12  Gastric bypass status for obesity (V45 86) (Z98 84)   13  Generalized obesity (278 00) (E66 9)   14  Herpes zoster (053 9) (B02 9)   15  History of thyroid nodule (V12 29) (Z86 39)   16  Influenza vaccine needed (V04 81) (Z23)   17  Large breasts (611 1) (N62)   18  Midline thoracic back pain (724 1) (M54 6)   19  Morbid obesity (278 01) (E66 01)   20  Nausea (787 02) (R11 0)   21  Oligomenorrhea (626 1) (N91 5)   22  Onychomycosis (110 1) (B35 1)   23  Paresthesia of arm (782 0) (R20 2)   24  Perimenopause (627 2) (N95 1)   25  Plantar fasciitis (728 71) (M72 2)   26  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)   27  Postgastrectomy malabsorption (579 3) (K91 2,Z90 3)   28  Preoperative cardiovascular examination (V72 81) (Z01 810)   29  Pre-operative laboratory examination (V72 63) (Z01 812)   30  Screening for breast cancer (V76 10) (Z12 31)   31  Seborrheic keratosis (702 19) (L82 1)   32  Segmental and somatic dysfunction of cervical region (739 1) (M99 01)   33  Somatic dysfunction of spine, thoracic (739 2) (M99 02)   34  Tendonitis, Achilles (726 71) (M76 60)   35  Tendonitis, Achilles (726 71) (M76 60)   36  Thoracic myofascial strain (847 1) (S29 019A)   37  Tingling (782 0) (R20 2)   38   Trigger middle finger of right hand (727 03) (M65 331)    Past Medical History    · Acute conjunctivitis (372 00) (H10 30)   · Acute frontal sinusitis (461 1) (J01 10)   · History of Bacterial vaginosis (616 10,041 9) (N76 0,B96 89)   · History of Depression (311) (F32 9)   · History of Encounter for consultation (V65 9) (Z71 9)   · History of Encounter for screening mammogram for breast cancer (V76 12) (Z12 31)   · History of Generalized obesity (278 00) (E66 9)   · History of Heel pain (729 5) (M79 673)   · History of acute sinusitis (V12 69) (Z87 09)   · History of carpal tunnel syndrome (V12 49) (Z86 69)   · History of viral warts (V12 09) (Z86 19)   · History of Influenza vaccine needed (V04 81) (Z23)   · History of Itching in the vaginal area (698 1) (L29 8)   · History of Pain, upper back (724 5) (M54 9)   · History of Palpitations (785 1) (R00 2)   · History of Positive PPD (795 51) (R76 11)   · History of Tendonitis, Achilles (726 71) (M76 60)   · History of Visit for routine gyn exam (V7) (Z01 419)    The active problems and past medical history were reviewed and updated today  Surgical History    · History of  Section   · History of Gastric Surgery For Morbid Obesity Bypass With Lauro-en-Y   · History of Neuroplasty Decompression Median Nerve At Carpal Tunnel   · History of Nose Surgery   · History of Tonsillectomy   · History of Tubal Ligation    The surgical history was reviewed and updated today  Family History  Mother    · Family history of Diabetes Mellitus (V18 0)   · Family history of Heart Disease (V17 49)   · Family history of Stroke Syndrome (V17 1)  Father    · Family history of Heart Disease (V17 49)   · Family history of Hypertension (V17 49)   · Family history of Renal Disease  Maternal Grandmother    · Family history of Stroke Syndrome (V17 1)  Maternal Grandfather    · Family history of Alzheimer Disease  Maternal Aunt    · Family history of Arthritis (V17 7)   · Family history of Diabetes Mellitus (V18 0)   · Family history of Hypertension (V17 49)    The family history was reviewed and updated today  Social History    · History of Being A Social Drinker   · Former smoker (N58 44) (S92 641)   · No alcohol use   · No drug use   · Occupation:   · 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[place] [address] history was reviewed and updated today  Current Meds   1  Acetaminophen Extra Strength 500 MG/15ML Oral Liquid; SWALLOW 20 ML Every 8   hours PRN; Therapy: 77DID9174 to (Evaluate:32Ooy7993)  Requested for: 2017; Last   Rx:2017 Ordered   2  Calcium 600 MG Oral Tablet; TAKE 1 TABLET 3 times daily; Therapy: 12EVN1765 to Recorded   3  Cyanocobalamin Powder; Therapy: 75MZN3415 to Recorded   4  Daily Multiple Vitamins Oral Tablet; Therapy: (Recorded:2016) to Recorded   5   Gabapentin 300 MG/6ML Oral Solution; 8mL po q AM and 8mL po qhs for 1 week and   then 4mLpo q AM and 4mL po qhs for 1 week, then stop medication; Therapy: 09WTM6412 to (Jamee Martinez)  Requested for: 11GDL4111; Last   Rx:14Nov2017 Ordered   6  Melatonin 10 MG Oral Tablet; TAKE 1 TABLET Bedtime; Therapy: 40LDE9623 to Recorded   7  Omeprazole 20 MG Oral Capsule Delayed Release; TAKE 1 CAPSULE DAILY EVERY   MORNING BEFORE BREAKFAST; Therapy: 21Xvf7694 to (Evaluate:07Oct2018); Last Rx:12Oct2017 Ordered   8  Turmeric 500 MG Oral Capsule; TAKE 2 CAPSULE Twice daily; Therapy: (Recorded:06Apr2017) to Recorded    The medication list was reviewed and updated today  Allergies    1  No Known Drug Allergies    Vitals   Recorded: 50Hnq8448 10:56AM   Temperature 97 8 F   Heart Rate 72   Systolic 82   Diastolic 60   Height 5 ft 1 in   Weight 177 lb 11 04 oz   BMI Calculated 33 57   BSA Calculated 1 8     Physical Exam    Constitutional: no acute distress, well appearing and well nourished  Pulmonary: no labored breathing and no wheezing  Cardiovascular: normal dorsalis pedis pulse, normal posterior tibialis pulse, normal capillary refill and no edema  Orthopedic/Biomechanical: tenderness to b/l medial tubercle of calcaneus  mmt 5/5 b/l  Skin: normal skin turgor, no lesions, no wart(s), no ulcer(s), no edema, no keratoses and no interdigital maceration  Neurologic: grossly intact  Psychiatric: oriented to person, place, and time        Future Appointments    Date/Time Provider Specialty Site   05/17/2018 01:00 PM ADELAIDA Colón  Plastic Surgery BODY EVOLUTION PLASTIC RECONS [id number]   02/16/2018 02:00 PM Marisol Dan, [address] [facility]   01/04/2018 05:20 PM Armando Scott DO Family Medicine [facility]     Signatures   Electronically signed by : Honey Fleischer, DPM; Dec 26 2017 11:06AM EST                       (Author)

## 2018-01-24 VITALS
BODY MASS INDEX: 33.28 KG/M2 | SYSTOLIC BLOOD PRESSURE: 118 MMHG | HEIGHT: 61 IN | DIASTOLIC BLOOD PRESSURE: 66 MMHG | HEART RATE: 72 BPM | TEMPERATURE: 97.6 F | WEIGHT: 176.25 LBS | RESPIRATION RATE: 16 BRPM

## 2018-01-24 LAB — FUNGUS SPEC CULT: ABNORMAL

## 2018-01-30 ENCOUNTER — TELEPHONE (OUTPATIENT)
Dept: FAMILY MEDICINE CLINIC | Facility: CLINIC | Age: 48
End: 2018-01-30

## 2018-01-30 DIAGNOSIS — B35.1 ONYCHOMYCOSIS: Primary | ICD-10-CM

## 2018-01-30 NOTE — TELEPHONE ENCOUNTER
----- Message from Daniel Arreola DO sent at 1/30/2018 11:42 AM EST -----  Could you please call this Pt to let her know that her fungal culture of her toe nails was indeed positive for fungus, so I can try to prescribe her an antifungal oral medication that she can take for 6 weeks, and hopefully her insurance will cover it  However, before I can prescribe this medication, which is called lamisil, we need to check her liver enzymes first, because this medication has a potential to raise the liver enzymes over time  So we would need to check the liver enzymes now before starting, and then again in 6-8 weeks, when she's completed the therapy  Please let her know that we can mail the lab slip to her, if she likes  I ordered it as "Hepatic Function Panel "    Thank you,   Tawana    Pt informed to have bloodwork done prior to Lamisil treatment   She was told to call back when bloodwork completd

## 2018-02-03 ENCOUNTER — APPOINTMENT (OUTPATIENT)
Dept: LAB | Facility: HOSPITAL | Age: 48
End: 2018-02-03
Payer: COMMERCIAL

## 2018-02-03 ENCOUNTER — TRANSCRIBE ORDERS (OUTPATIENT)
Dept: LAB | Facility: HOSPITAL | Age: 48
End: 2018-02-03

## 2018-02-03 DIAGNOSIS — B35.1 ONYCHOMYCOSIS: ICD-10-CM

## 2018-02-03 LAB
ALBUMIN SERPL BCP-MCNC: 3.8 G/DL (ref 3.5–5)
ALP SERPL-CCNC: 66 U/L (ref 46–116)
ALT SERPL W P-5'-P-CCNC: 29 U/L (ref 12–78)
AST SERPL W P-5'-P-CCNC: 24 U/L (ref 5–45)
BILIRUB DIRECT SERPL-MCNC: 0.14 MG/DL (ref 0–0.2)
BILIRUB SERPL-MCNC: 0.57 MG/DL (ref 0.2–1)
PROT SERPL-MCNC: 7.5 G/DL (ref 6.4–8.2)

## 2018-02-03 PROCEDURE — 36415 COLL VENOUS BLD VENIPUNCTURE: CPT

## 2018-02-03 PROCEDURE — 80076 HEPATIC FUNCTION PANEL: CPT

## 2018-02-13 RX ORDER — UBIDECARENONE 75 MG
CAPSULE ORAL
COMMUNITY
Start: 2017-11-09 | End: 2018-08-02

## 2018-02-13 RX ORDER — ONDANSETRON 4 MG/1
TABLET, ORALLY DISINTEGRATING ORAL
Refills: 0 | COMMUNITY
Start: 2017-11-16 | End: 2018-08-02

## 2018-02-13 RX ORDER — PROPRANOLOL/HYDROCHLOROTHIAZID 40 MG-25MG
2 TABLET ORAL 2 TIMES DAILY
COMMUNITY
End: 2018-08-02

## 2018-02-13 RX ORDER — METOCLOPRAMIDE 5 MG/1
TABLET ORAL
Refills: 0 | COMMUNITY
Start: 2017-11-16 | End: 2018-08-02

## 2018-02-13 RX ORDER — IBUPROFEN 200 MG
1 CAPSULE ORAL 3 TIMES DAILY
COMMUNITY
Start: 2017-11-09 | End: 2018-08-02

## 2018-02-13 RX ORDER — ASPIRIN/CAFFEINE 500-32.5MG
TABLET ORAL
Refills: 1 | COMMUNITY
Start: 2017-11-25 | End: 2018-11-13 | Stop reason: ALTCHOICE

## 2018-02-13 RX ORDER — OMEPRAZOLE 20 MG/1
1 CAPSULE, DELAYED RELEASE ORAL DAILY
COMMUNITY
Start: 2017-08-02 | End: 2018-08-02

## 2018-02-13 RX ORDER — GABAPENTIN 250 MG/5ML
SOLUTION ORAL
Refills: 1 | COMMUNITY
Start: 2017-11-07 | End: 2018-08-02

## 2018-02-16 ENCOUNTER — OFFICE VISIT (OUTPATIENT)
Dept: BARIATRICS | Facility: CLINIC | Age: 48
End: 2018-02-16
Payer: COMMERCIAL

## 2018-02-16 VITALS
HEART RATE: 82 BPM | BODY MASS INDEX: 30.11 KG/M2 | DIASTOLIC BLOOD PRESSURE: 64 MMHG | TEMPERATURE: 98 F | WEIGHT: 159.5 LBS | SYSTOLIC BLOOD PRESSURE: 104 MMHG | RESPIRATION RATE: 18 BRPM | HEIGHT: 61 IN

## 2018-02-16 DIAGNOSIS — Z98.84 BARIATRIC SURGERY STATUS: Primary | ICD-10-CM

## 2018-02-16 DIAGNOSIS — K91.2 POSTSURGICAL MALABSORPTION: ICD-10-CM

## 2018-02-16 DIAGNOSIS — E78.5 DYSLIPIDEMIA: ICD-10-CM

## 2018-02-16 DIAGNOSIS — K21.9 GASTROESOPHAGEAL REFLUX DISEASE, ESOPHAGITIS PRESENCE NOT SPECIFIED: ICD-10-CM

## 2018-02-16 PROBLEM — E66.01 MORBID OBESITY (HCC): Status: RESOLVED | Noted: 2017-11-06 | Resolved: 2018-02-16

## 2018-02-16 PROCEDURE — 99213 OFFICE O/P EST LOW 20 MIN: CPT | Performed by: PHYSICIAN ASSISTANT

## 2018-02-16 NOTE — ASSESSMENT & PLAN NOTE
-s/p Lauro-En-Y Gastric Bypass with Dr Sharyle Smiling on 11/6/2017  Overall doing Fairly well-very well with her weight loss/notes occasional intolerance to chicken-advised on eating slowly, chewing well and preparation of moist proteins  She was on reglan for some nausea post-op which she reports is resolved  Advised she can discontinue reglan now  Initial:212 5 lb  Current: 159 5 lb  EWL: 66% excess body weight loss which is above average for this time frame  Brandon: Current  Current BMI is Body mass index is 30 14 kg/m²  Tolerating a regular diet-overall yes-advised on moist proteins  Eating at least 60 grams of protein per day-yes  Following 30/60 minute rule with liquids-yes  Drinking at least 64 ounces of fluid per day-yes  Drinking carbonated beverages-no  Sufficient exercise-yes  Using NSAIDs regularly-no  Using nicotine-no  Using alcohol-no    She notes she was on NSAID twice daily pre-op for plantar fasciitis and tylenol is not helping/encouraged her to follow-up with PCP or podiatrist regarding this

## 2018-02-16 NOTE — PROGRESS NOTES
Assessment/Plan:    GERD (gastroesophageal reflux disease)  Controlled on ppi-continue for now    Dyslipidemia  Pre-op lipids-May 2017 with high LDL of 149 and low HDL of 39-continued weight loss likely to help  Encouraged regular exercise for improvement in HDL as well but may have a component of genetic hyperlipidemia  Will check fasting lipid profile with her routine labs now  Postsurgical malabsorption  -At risk for malabsorption of vitamins/minerals secondary to malabsorption and restriction of intake from their procedure  -Currently taking adequate postop bariatric surgery vitamin supplementation-yes    -first set of bariatric labs ordered for approximately 1 month   She is taking one [employer] mvi and calcium three times daily      Bariatric surgery status  -s/p Lauro-En-Y Gastric Bypass with Dr Mic Ponce on 11/6/2017  Overall doing Fairly well-very well with her weight loss/notes occasional intolerance to chicken-advised on eating slowly, chewing well and preparation of moist proteins  She was on reglan for some nausea post-op which she reports is resolved  Advised she can discontinue reglan now  Initial:212 5 lb  Current: 159 5 lb  EWL: 66% excess body weight loss which is above average for this time frame  Brandon: Current  Current BMI is Body mass index is 30 14 kg/m²  Tolerating a regular diet-overall yes-advised on moist proteins  Eating at least 60 grams of protein per day-yes  Following 30/60 minute rule with liquids-yes  Drinking at least 64 ounces of fluid per day-yes  Drinking carbonated beverages-no  Sufficient exercise-yes  Using NSAIDs regularly-no  Using nicotine-no  Using alcohol-no    She notes she was on NSAID twice daily pre-op for plantar fasciitis and tylenol is not helping/encouraged her to follow-up with PCP or podiatrist regarding this         Diagnoses and all orders for this visit:    Bariatric surgery status  -     CBC and differential; Future  -     Lipid panel; Future  -     Vitamin B12; Future  -     Vitamin A; Future  -     PTH, intact; Future  -     Vitamin B1, whole blood; Future  -     Vitamin D 25 hydroxy; Future  -     Folate; Future  -     Ferritin; Future  -     Iron Saturation %; Future  -     Basic metabolic panel; Future    Postsurgical malabsorption  -     CBC and differential; Future  -     Lipid panel; Future  -     Vitamin B12; Future  -     Vitamin A; Future  -     PTH, intact; Future  -     Vitamin B1, whole blood; Future  -     Vitamin D 25 hydroxy; Future  -     Folate; Future  -     Ferritin; Future  -     Iron Saturation %; Future  -     Basic metabolic panel; Future    Gastroesophageal reflux disease, esophagitis presence not specified    Dyslipidemia  -     CBC and differential; Future  -     Lipid panel; Future  -     Vitamin B12; Future  -     Vitamin A; Future  -     PTH, intact; Future  -     Vitamin B1, whole blood; Future  -     Vitamin D 25 hydroxy; Future  -     Folate; Future  -     Ferritin; Future  -     Iron Saturation %; Future          Subjective:      Patient ID: Jarod Cates is a 53 y o  female  She is here for routine follow-up  Has intermittent problems with chicken but overall tolerating diet and taking bariatric supplements  She has plantar fasciitis but is using bike for exercise and tolerating this  She notes her feet pain have been an issue for her since she cannot use NSAIDs        The following portions of the patient's history were reviewed and updated as appropriate: allergies, current medications, past family history, past medical history, past social history, past surgical history and problem list        Review of Systems   Constitutional: Negative for chills and fever  Unexpected weight change: planned weight loss  Respiratory: Negative for shortness of breath and wheezing  Cardiovascular: Negative for chest pain and palpitations     Gastrointestinal: Negative for abdominal pain, constipation, diarrhea, nausea and vomiting  Psychiatric/Behavioral: Suicidal ideas: no complait of anxiety or depression  Objective:      /64   Pulse 82   Temp 98 °F (36 7 °C)   Resp 18   Ht 5' 1" (1 549 m)   Wt 72 3 kg (159 lb 8 oz)   BMI 30 14 kg/m²          Physical Exam   Constitutional: She is oriented to person, place, and time  She appears well-developed and well-nourished  HENT:   Mouth/Throat: Oropharynx is clear and moist    Eyes: Conjunctivae are normal  No scleral icterus  Cardiovascular: Normal rate, regular rhythm and normal heart sounds  Pulmonary/Chest: Effort normal and breath sounds normal    Abdominal: Soft  There is no tenderness  No incisional hernias appreciated   Musculoskeletal:   Normal gait   Neurological: She is alert and oriented to person, place, and time  Psychiatric: She has a normal mood and affect  Her behavior is normal  Judgment and thought content normal      GOALS: Continued weight loss with good nutrition intakes    Normal vitamin and mineral levels  Exercise as tolerated    BARRIERS: none identified

## 2018-02-16 NOTE — ASSESSMENT & PLAN NOTE
Pre-op lipids-May 2017 with high LDL of 149 and low HDL of 39-continued weight loss likely to help  Encouraged regular exercise for improvement in HDL as well but may have a component of genetic hyperlipidemia  Will check fasting lipid profile with her routine labs now

## 2018-02-16 NOTE — ASSESSMENT & PLAN NOTE
-At risk for malabsorption of vitamins/minerals secondary to malabsorption and restriction of intake from their procedure  -Currently taking adequate postop bariatric surgery vitamin supplementation-yes    -first set of bariatric labs ordered for approximately 1 month   She is taking one [employer] mvi and calcium three times daily

## 2018-02-16 NOTE — PATIENT INSTRUCTIONS
Follow-up in 3 months  Follow diet as discussed  Get lab work done prior to next office visit  It is recommended to check with your insurance BEFORE getting labs done to make sure they are covered by your policy  Make sure to HOLD any multivitamins that may contain biotin and any biotin supplements FOR 5 DAYS before any labs since it can affect the results  Follow vitamin  and mineral recommendations as reviewed with you  Exercise as tolerated  Call our office if you have any problems with abdominal pain especially associated with fever, chills, nausea, vomiting or any other concerns  All  Post-bariatric surgery patients should be aware that very small quantities of any alcohol  can cause impairment and it is very possible not to feel the effect  The effect can be in the system for several hours  It is also a stomach irritant  It is advised to AVOID alcohol, Nonsteroidal antiinflammatory drugs (NSAIDS) and nicotine of all forms   Any of these can cause stomach irritation/pain  Can stop reglan now if you are not having nausea    Continue omeprazole daily for now-continue to open capsule for this medication

## 2018-03-14 ENCOUNTER — HOSPITAL ENCOUNTER (OUTPATIENT)
Dept: RADIOLOGY | Facility: HOSPITAL | Age: 48
Discharge: HOME | End: 2018-03-14
Payer: COMMERCIAL

## 2018-03-14 DIAGNOSIS — Z12.39 SCREENING BREAST EXAMINATION: ICD-10-CM

## 2018-03-14 PROCEDURE — 77067 SCR MAMMO BI INCL CAD: CPT

## 2018-04-21 ENCOUNTER — APPOINTMENT (OUTPATIENT)
Dept: LAB | Facility: HOSPITAL | Age: 48
End: 2018-04-21
Payer: COMMERCIAL

## 2018-04-21 DIAGNOSIS — K91.2 POSTSURGICAL MALABSORPTION: ICD-10-CM

## 2018-04-21 DIAGNOSIS — E78.5 DYSLIPIDEMIA: ICD-10-CM

## 2018-04-21 DIAGNOSIS — Z98.84 BARIATRIC SURGERY STATUS: ICD-10-CM

## 2018-04-21 LAB
25(OH)D3 SERPL-MCNC: 39.2 NG/ML (ref 30–100)
ANION GAP SERPL CALCULATED.3IONS-SCNC: 4 MMOL/L (ref 4–13)
BASOPHILS # BLD AUTO: 0.02 THOUSANDS/ΜL (ref 0–0.1)
BASOPHILS NFR BLD AUTO: 0 % (ref 0–1)
BUN SERPL-MCNC: 14 MG/DL (ref 5–25)
CALCIUM SERPL-MCNC: 9.4 MG/DL (ref 8.3–10.1)
CHLORIDE SERPL-SCNC: 106 MMOL/L (ref 100–108)
CHOLEST SERPL-MCNC: 157 MG/DL (ref 50–200)
CO2 SERPL-SCNC: 31 MMOL/L (ref 21–32)
CREAT SERPL-MCNC: 0.64 MG/DL (ref 0.6–1.3)
EOSINOPHIL # BLD AUTO: 0.17 THOUSAND/ΜL (ref 0–0.61)
EOSINOPHIL NFR BLD AUTO: 3 % (ref 0–6)
ERYTHROCYTE [DISTWIDTH] IN BLOOD BY AUTOMATED COUNT: 12.7 % (ref 11.6–15.1)
FERRITIN SERPL-MCNC: 73 NG/ML (ref 8–388)
FOLATE SERPL-MCNC: 19.4 NG/ML (ref 3.1–17.5)
GFR SERPL CREATININE-BSD FRML MDRD: 107 ML/MIN/1.73SQ M
GLUCOSE P FAST SERPL-MCNC: 84 MG/DL (ref 65–99)
HCT VFR BLD AUTO: 41.3 % (ref 34.8–46.1)
HDLC SERPL-MCNC: 46 MG/DL (ref 40–60)
HGB BLD-MCNC: 14.1 G/DL (ref 11.5–15.4)
IRON SATN MFR SERPL: 27 %
IRON SERPL-MCNC: 87 UG/DL (ref 50–170)
LDLC SERPL CALC-MCNC: 91 MG/DL (ref 0–100)
LYMPHOCYTES # BLD AUTO: 2 THOUSANDS/ΜL (ref 0.6–4.47)
LYMPHOCYTES NFR BLD AUTO: 32 % (ref 14–44)
MCH RBC QN AUTO: 32 PG (ref 26.8–34.3)
MCHC RBC AUTO-ENTMCNC: 34.1 G/DL (ref 31.4–37.4)
MCV RBC AUTO: 94 FL (ref 82–98)
MONOCYTES # BLD AUTO: 0.42 THOUSAND/ΜL (ref 0.17–1.22)
MONOCYTES NFR BLD AUTO: 7 % (ref 4–12)
NEUTROPHILS # BLD AUTO: 3.61 THOUSANDS/ΜL (ref 1.85–7.62)
NEUTS SEG NFR BLD AUTO: 58 % (ref 43–75)
NONHDLC SERPL-MCNC: 111 MG/DL
NRBC BLD AUTO-RTO: 0 /100 WBCS
PLATELET # BLD AUTO: 223 THOUSANDS/UL (ref 149–390)
PMV BLD AUTO: 9.9 FL (ref 8.9–12.7)
POTASSIUM SERPL-SCNC: 4.4 MMOL/L (ref 3.5–5.3)
PTH-INTACT SERPL-MCNC: 48.2 PG/ML (ref 18.4–80.1)
RBC # BLD AUTO: 4.41 MILLION/UL (ref 3.81–5.12)
SODIUM SERPL-SCNC: 141 MMOL/L (ref 136–145)
TIBC SERPL-MCNC: 324 UG/DL (ref 250–450)
TRIGL SERPL-MCNC: 99 MG/DL
VIT B12 SERPL-MCNC: 1174 PG/ML (ref 100–900)
WBC # BLD AUTO: 6.23 THOUSAND/UL (ref 4.31–10.16)

## 2018-04-21 PROCEDURE — 85025 COMPLETE CBC W/AUTO DIFF WBC: CPT

## 2018-04-21 PROCEDURE — 82728 ASSAY OF FERRITIN: CPT

## 2018-04-21 PROCEDURE — 83540 ASSAY OF IRON: CPT

## 2018-04-21 PROCEDURE — 82746 ASSAY OF FOLIC ACID SERUM: CPT

## 2018-04-21 PROCEDURE — 36415 COLL VENOUS BLD VENIPUNCTURE: CPT

## 2018-04-21 PROCEDURE — 80061 LIPID PANEL: CPT

## 2018-04-21 PROCEDURE — 82306 VITAMIN D 25 HYDROXY: CPT

## 2018-04-21 PROCEDURE — 84425 ASSAY OF VITAMIN B-1: CPT

## 2018-04-21 PROCEDURE — 83970 ASSAY OF PARATHORMONE: CPT

## 2018-04-21 PROCEDURE — 80048 BASIC METABOLIC PNL TOTAL CA: CPT

## 2018-04-21 PROCEDURE — 83550 IRON BINDING TEST: CPT

## 2018-04-21 PROCEDURE — 82607 VITAMIN B-12: CPT

## 2018-04-21 PROCEDURE — 84590 ASSAY OF VITAMIN A: CPT

## 2018-04-26 ENCOUNTER — OFFICE VISIT (OUTPATIENT)
Dept: FAMILY MEDICINE CLINIC | Facility: CLINIC | Age: 48
End: 2018-04-26
Payer: COMMERCIAL

## 2018-04-26 VITALS
TEMPERATURE: 97 F | SYSTOLIC BLOOD PRESSURE: 110 MMHG | RESPIRATION RATE: 14 BRPM | HEIGHT: 62 IN | HEART RATE: 78 BPM | DIASTOLIC BLOOD PRESSURE: 70 MMHG | WEIGHT: 150.4 LBS | BODY MASS INDEX: 27.68 KG/M2

## 2018-04-26 DIAGNOSIS — R10.2 ACUTE PELVIC PAIN, FEMALE: ICD-10-CM

## 2018-04-26 DIAGNOSIS — Z00.00 ROUTINE ADULT HEALTH MAINTENANCE: ICD-10-CM

## 2018-04-26 DIAGNOSIS — N95.1 PERIMENOPAUSE: ICD-10-CM

## 2018-04-26 DIAGNOSIS — B35.1: Primary | ICD-10-CM

## 2018-04-26 LAB — VIT A SERPL-MCNC: 33 UG/DL (ref 33.1–100)

## 2018-04-26 PROCEDURE — 99213 OFFICE O/P EST LOW 20 MIN: CPT | Performed by: FAMILY MEDICINE

## 2018-04-26 RX ORDER — TERBINAFINE HYDROCHLORIDE 250 MG/1
250 TABLET ORAL DAILY
Qty: 90 TABLET | Refills: 0 | Status: SHIPPED | OUTPATIENT
Start: 2018-04-26 | End: 2018-07-25

## 2018-04-26 NOTE — PROGRESS NOTES
Desiree An 1970 female MRN: [id number]    Family Medicine Follow-up Visit    ASSESSMENT/PLAN  Problem List Items Addressed This Visit     Onychomycosis due to trichophyton mentagrophytes - Primary     Prescribed lamisil 250 daily for 12wk given previous culture+ toenail clippings and normal LFTs on recent hepatic function panel from Feb 2018         Relevant Medications    terbinafine (LamISIL) 250 mg tablet    Perimenopause     Last period over 4 months ago  Will continue to monitor - Pt denying sx of hot flashes         Routine adult health maintenance     Pt to return next month for pap smear  Mammogram [facility] 1 March 2018         Acute pelvic pain, female     Inconsistent and rarely occurring, not on a weekly basis  Pt to monitor possible triggers  Will examine internally during pelvic exam when she returns for her pap next month  If discomfort still persists, will consider TVUS                     Future Appointments  Date Time Provider Radha Miles   5/10/2018 1:20 PM Dang Farah, DO S [facility]  [facility]   5/17/2018 1:00 PM Derik Wolf MD BD L [facility]          SUBJECTIVE  CC: Follow-up and Pelvic Pain      HPI:  Desiree An is a 53 y o  female who presents for concern of lower pelvic pain and general follow up  Outside of her vagina she feels a brief pressure when she pees very rarely  Last week happened once the whole week  No dysuria, urinary urgency/frequency/hematuria  She also has a stabbing pain in her groin b/l, L>R that she has noticed for the past few weeks  This also happens extremely infrequently, not even once a week  She wonders if it is her ovaries  She says she still gets symptoms as if she is going to have her period monthly, e g  Back and breast tenderness, but then she doesn't bleed  She wonders if this sharp groin pain is a part of those hormonal symptoms  She is sexually active with her    Has not had her period for the past 4 months, and she believes she is becoming perimenopausal          Review of Systems   Constitutional: Negative for chills, fatigue and fever  Respiratory: Negative for cough and shortness of breath  Cardiovascular: Negative for chest pain and palpitations  Gastrointestinal: Negative for abdominal pain, constipation, diarrhea, nausea and vomiting  Genitourinary: Positive for pelvic pain  As noted in HPI   Skin: Negative for rash  Historical Information   The patient history was reviewed as follows:    Past Medical History:   Diagnosis Date    Achilles tendinitis     right     Back pain     Carpal tunnel syndrome     Last Assessed: 2016    Depression     Generalized obesity     Last Assessed: 10/5/2016    GERD (gastroesophageal reflux disease)     Hyperlipidemia     Multiple thyroid nodules     Palpitations     Last Assessed: 2013    Plantar fasciitis     bilateral    Positive PPD     Last Assessed: 10/21/2014    Tendonitis, Achilles     Last Assessed: 3/3/2017    Varicose vein of leg     Viral warts     Last Assessed: 2013     Past Surgical History:   Procedure Laterality Date    CARPAL TUNNEL RELEASE       SECTION      x3    ESOPHAGOGASTRODUODENOSCOPY N/A 2017    Procedure: ESOPHAGOGASTRODUODENOSCOPY (EGD); Surgeon: Ruthann Moore MD;  [facility];  Service: Bariatrics    NOSE SURGERY      PARAESOPHAGEAL HERNIA REPAIR N/A 2017    Procedure: REPAIR HERNIA PARAESOPHAGEAL  LAPAROSCOPIC;  Surgeon: Ruthann Moore MD;  [facility];  Service: Bariatrics    KS EGD TRANSORAL BIOPSY SINGLE/MULTIPLE N/A 2017    Procedure: ESOPHAGOGASTRODUODENOSCOPY (EGD) with biopsy;  Surgeon: Ruthann Moore MD;  [facility];   Service: Bariatrics    KS LAP GASTRIC BYPASS/MARCIN-EN-Y N/A 2017    Procedure: BYPASS GASTRIC  MARCIN-EN-Y LAPAROSCOPIC;  Surgeon: Ruthann Moore MD;  [facility];  Service: Bariatrics    KS WRIST [place] LIG Right 1/27/2017    Procedure: ENDOSCOPIC CARPAL TUNNEL RELEASE ;  Surgeon: Anuradha Owen MD;  [facility];  Service: Orthopedics    [place] WRIST [place] LIG Left 6/16/2017    Procedure: ENDOSCOPIC CARPAL TUNNEL RELEASE;  Surgeon: Anuradha Owen MD;  [facility];  Service: Orthopedics    TONSILLECTOMY      TUBAL LIGATION      WISDOM TOOTH EXTRACTION       Family History   Problem Relation Age of Onset    Diabetes Mother     Heart disease Mother     Stroke Mother      Syndrome    Heart disease Father     Hypertension Father     Kidney disease Father     Stroke Maternal Grandmother      Syndrome    Alzheimer's disease Maternal Grandfather     Arthritis Maternal Aunt     Diabetes Maternal Aunt     Hypertension Maternal Aunt       Social History   History   Alcohol Use No     History   Drug Use No     History   Smoking Status    Former Smoker    Quit date: 1/27/2014   Smokeless Tobacco    Never Used       Medications:     Current Outpatient Prescriptions:     Biotin w/ Vitamins C & E (HAIR/SKIN/NAILS) 1250-7 5-7 5 MCG-MG-UNT CHEW, Chew 1 tablet 2 (two) times a day  , Disp: , Rfl:     Calcium 600 MG tablet, Take 1 tablet by mouth 3 (three) times a day, Disp: , Rfl:     Calcium Citrate-Vitamin D (CALCIUM CITRATE+D3 PO), Take 1 tablet by mouth 3 (three) times a day, Disp: , Rfl:     [employer] PAIN RELIEF ADULT 500 MG/15ML LIQD, SWALLOW 20 ML EVERY 8 HOURS AS NEEDED, Disp: , Rfl: 1    cyanocobalamin (VITAMIN B-12) 100 mcg tablet, by Does not apply route, Disp: , Rfl:     cyanocobalamin 1000 MCG tablet, Take 100 mcg by mouth daily, Disp: , Rfl:     DAILY MULTIPLE VITAMINS/IRON PO, Take by mouth, Disp: , Rfl:     Melatonin 10 MG TABS, Take 10 mg by mouth daily at bedtime as needed, Disp: , Rfl:     multivitamin (THERAGRAN) TABS, Take 1 tablet by mouth daily Bariatric vitamin , Disp: , Rfl:     omeprazole (PriLOSEC) 20 mg delayed release capsule, Take 1 capsule by mouth daily, Disp: 30 capsule, Rfl: 6    gabapentin (NEURONTIN) 300 mg capsule, Take 600 mg by mouth 2 (two) times a day  , Disp: , Rfl:     Gabapentin 300 MG/6ML SOLN, TAKE 6ML BY MOUTH IN THE MORNING AND 18ML BY MOUTH AT BEDTIME, Disp: , Rfl: 1    metoclopramide (REGLAN) 5 mg tablet, TAKE 1 TABLET 4 TIMES DAILY AS NEED FOR NAUSEA, Disp: , Rfl: 0    omeprazole (PriLOSEC) 20 mg delayed release capsule, Take 20 mg by mouth daily, Disp: , Rfl:     omeprazole (PriLOSEC) 20 mg delayed release capsule, Take 1 capsule by mouth Daily, Disp: , Rfl:     ondansetron (ZOFRAN-ODT) 4 mg disintegrating tablet, DISSOLVE 1 TABLET EVERY 6 HOURS AS NEEDED FOR NAUSEA, Disp: , Rfl: 0    terbinafine (LamISIL) 250 mg tablet, Take 1 tablet (250 mg total) by mouth daily for 90 days, Disp: 90 tablet, Rfl: 0    Turmeric 1053 MG TABS, Take 2 tablets by mouth daily  , Disp: , Rfl:     Turmeric 500 MG CAPS, Take 2 capsules by mouth 2 (two) times a day, Disp: , Rfl:   Allergies   Allergen Reactions    Latex Itching       OBJECTIVE    Vitals:   Vitals:    04/26/18 1401   BP: 110/70   BP Location: Right arm   Patient Position: Sitting   Cuff Size: Large   Pulse: 78   Resp: 14   Temp: (!) 97 °F (36 1 °C)   TempSrc: Tympanic   Weight: 68 2 kg (150 lb 6 4 oz)   Height: 5' 1 5" (1 562 m)     Wt Readings from Last 3 Encounters:   04/26/18 68 2 kg (150 lb 6 4 oz)   02/16/18 72 3 kg (159 lb 8 oz)   01/04/18 79 9 kg (176 lb 4 oz)     Body mass index is 27 96 kg/m²  Physical Exam   Constitutional: She is oriented to person, place, and time  She appears well-developed and well-nourished  No distress  HENT:   Head: Normocephalic and atraumatic  Eyes: Conjunctivae and EOM are normal  Right eye exhibits no discharge  Left eye exhibits no discharge  No scleral icterus  Neck: Normal range of motion  Neck supple  No tracheal deviation present     Cardiovascular: Normal rate, regular rhythm, normal heart sounds and intact distal pulses  Exam reveals no gallop and no friction rub  No murmur heard  Pulmonary/Chest: Effort normal and breath sounds normal  No respiratory distress  She has no wheezes  She has no rales  Abdominal: Soft  Bowel sounds are normal  She exhibits no distension  There is no tenderness  There is no rebound and no guarding  No suprapubic tenderness, no tenderness of groin b/l   Musculoskeletal: She exhibits no edema or deformity  Lymphadenopathy:     She has no cervical adenopathy  Neurological: She is alert and oriented to person, place, and time  She exhibits normal muscle tone  Skin: Skin is warm and dry  No rash noted  She is not diaphoretic  No erythema  Psychiatric: She has a normal mood and affect  Her behavior is normal  Judgment and thought content normal    Vitals reviewed  Lab:  I have personally reviewed all pertinent results       Appointment on 04/21/2018   Component Date Value Ref Range Status    WBC 04/21/2018 6 23  4 31 - 10 16 Thousand/uL Final    RBC 04/21/2018 4 41  3 81 - 5 12 Million/uL Final    Hemoglobin 04/21/2018 14 1  11 5 - 15 4 g/dL Final    Hematocrit 04/21/2018 41 3  34 8 - 46 1 % Final    MCV 04/21/2018 94  82 - 98 fL Final    MCH 04/21/2018 32 0  26 8 - 34 3 pg Final    MCHC 04/21/2018 34 1  31 4 - 37 4 g/dL Final    RDW 04/21/2018 12 7  11 6 - 15 1 % Final    MPV 04/21/2018 9 9  8 9 - 12 7 fL Final    Platelets 25/98/9738 223  149 - 390 Thousands/uL Final    nRBC 04/21/2018 0  /100 WBCs Final    Neutrophils Relative 04/21/2018 58  43 - 75 % Final    Lymphocytes Relative 04/21/2018 32  14 - 44 % Final    Monocytes Relative 04/21/2018 7  4 - 12 % Final    Eosinophils Relative 04/21/2018 3  0 - 6 % Final    Basophils Relative 04/21/2018 0  0 - 1 % Final    Neutrophils Absolute 04/21/2018 3 61  1 85 - 7 62 Thousands/µL Final    Lymphocytes Absolute 04/21/2018 2 00  0 60 - 4 47 Thousands/µL Final    Monocytes Absolute 04/21/2018 0 42 0 17 - 1 22 Thousand/µL Final    Eosinophils Absolute 04/21/2018 0 17  0 00 - 0 61 Thousand/µL Final    Basophils Absolute 04/21/2018 0 02  0 00 - 0 10 Thousands/µL Final    Cholesterol 04/21/2018 157  50 - 200 mg/dL Final      Cholesterol:       Desirable         <200 mg/dl       Borderline         200-239 mg/dl       High              >239           Triglycerides 04/21/2018 99  <=150 mg/dL Final    Specimen collection should occur prior to N-Acetylcysteine or Metamizole administration due to the potential for falsely depressed results   HDL, Direct 04/21/2018 46  40 - 60 mg/dL Final      HDL Cholesterol:       High    >59 mg/dL       Low     <41 mg/dL    LDL Calculated 04/21/2018 91  0 - 100 mg/dL Final      This screening LDL is a calculated result  It does not have the accuracy of the Direct Measured LDL in the monitoring of patients with hyperlipidemia and/or statin therapy  Direct Measure LDL (TXE440) must be ordered separately in these patients   Non-HDL-Chol (CHOL-HDL) 04/21/2018 111  mg/dl Final    Vitamin B-12 04/21/2018 1174* 100 - 900 pg/mL Final    Vitamin A 04/21/2018 33 0* 33 1 - 100 0 ug/dL Final    Reference intervals for vitamin A determined from [facility] and  Nutrition Examination Survey, 9890-6553  Individuals with vitamin A  less than 20 ug/dL are considered vitamin A deficient and those with  serum concentrations less than 10 ug/dL are considered severely  deficient  This test was developed and its performance characteristics  determined by [facility]  It has not been cleared or approved  by the Food and Drug Administration  **Please note reference interval change**    PTH 04/21/2018 48 2  18 4 - 80 1 pg/mL Final    Vitamin B1, Whole Blood 04/21/2018 160 7  66 5 - 200 0 nmol/L Final    This test was developed and its performance characteristics  determined by [facility]  It has not been cleared or approved  by the Food and Drug Administration      Vit D, 25-Hydroxy 04/21/2018 39 2  30 0 - 100 0 ng/mL Final    Folate 04/21/2018 19 4* 3 1 - 17 5 ng/mL Final    Ferritin 04/21/2018 73  8 - 388 ng/mL Final    Iron Saturation 04/21/2018 27  % Final    TIBC 04/21/2018 324  250 - 450 ug/dL Final    Iron 04/21/2018 87  50 - 170 ug/dL Final      Patients treated with metal-binding drugs (ie  Deferoxamine) may have depressed iron values   Sodium 04/21/2018 141  136 - 145 mmol/L Final    Potassium 04/21/2018 4 4  3 5 - 5 3 mmol/L Final    Chloride 04/21/2018 106  100 - 108 mmol/L Final    CO2 04/21/2018 31  21 - 32 mmol/L Final    Anion Gap 04/21/2018 4  4 - 13 mmol/L Final    BUN 04/21/2018 14  5 - 25 mg/dL Final    Creatinine 04/21/2018 0 64  0 60 - 1 30 mg/dL Final    Standardized to IDMS reference method    Glucose, Fasting 04/21/2018 84  65 - 99 mg/dL Final      Specimen collection should occur prior to Sulfasalazine administration due to the potential for falsely depressed results  Specimen collection should occur prior to Sulfapyridine administration due to the potential for falsely elevated results   Calcium 04/21/2018 9 4  8 3 - 10 1 mg/dL Final    eGFR 04/21/2018 107  ml/min/1 73sq m Final   Transcribe Orders on 02/03/2018   Component Date Value Ref Range Status    Total Bilirubin 02/03/2018 0 57  0 20 - 1 00 mg/dL Final    Bilirubin, Direct 02/03/2018 0 14  0 00 - 0 20 mg/dL Final    Alkaline Phosphatase 02/03/2018 66  46 - 116 U/L Final    AST 02/03/2018 24  5 - 45 U/L Final      Specimen collection should occur prior to Sulfasalazine and/or Sulfapyridine administration due to the potential for falsely depressed results   ALT 02/03/2018 29  12 - 78 U/L Final      Specimen collection should occur prior to Sulfasalazine and/or Sulfapyridine administration due to the potential for falsely depressed results       Total Protein 02/03/2018 7 5  6 4 - 8 2 g/dL Final    Albumin 02/03/2018 3 8  3 5 - 5 0 g/dL Final           Imaging:  I have personally reviewed all pertinent results        Oralia Yanez DO, PGY-3  [facility]   4/29/2018

## 2018-04-27 LAB — VIT B1 BLD-SCNC: 160.7 NMOL/L (ref 66.5–200)

## 2018-04-29 PROBLEM — N95.1 PERIMENOPAUSE: Status: ACTIVE | Noted: 2018-04-29

## 2018-04-29 PROBLEM — Z00.00 ROUTINE ADULT HEALTH MAINTENANCE: Status: ACTIVE | Noted: 2018-04-29

## 2018-04-29 PROBLEM — R10.2 ACUTE PELVIC PAIN, FEMALE: Status: ACTIVE | Noted: 2018-04-29

## 2018-04-29 NOTE — ASSESSMENT & PLAN NOTE
Prescribed lamisil 250 daily for 12wk given previous culture+ toenail clippings and normal LFTs on recent hepatic function panel from Feb 2018

## 2018-04-29 NOTE — ASSESSMENT & PLAN NOTE
Inconsistent and rarely occurring, not on a weekly basis  Pt to monitor possible triggers  Will examine internally during pelvic exam when she returns for her pap next month  If discomfort still persists, will consider TVUS

## 2018-05-17 ENCOUNTER — OFFICE VISIT (OUTPATIENT)
Dept: PLASTIC SURGERY | Facility: CLINIC | Age: 48
End: 2018-05-17
Payer: COMMERCIAL

## 2018-05-17 VITALS — WEIGHT: 146 LBS | BODY MASS INDEX: 27.56 KG/M2 | HEIGHT: 61 IN

## 2018-05-17 DIAGNOSIS — E65 ABDOMINAL PANNUS: ICD-10-CM

## 2018-05-17 DIAGNOSIS — N62 MACROMASTIA: Primary | ICD-10-CM

## 2018-05-17 PROCEDURE — 99203 OFFICE O/P NEW LOW 30 MIN: CPT | Performed by: SURGERY

## 2018-05-17 NOTE — LETTER
June 7, 2018     Maria Isabel Zamarripa MD  [place] [id number]    Patient: Álvaro Ellison   YOB: 1970   Date of Visit: 5/17/2018       Dear Dr Pj Kent Recipients: Thank you for referring Alex Scott to me for evaluation  Below are my notes for this consultation  If you have questions, please do not hesitate to call me  I look forward to following your patient along with you  Sincerely,        Carson Negron MD        CC: No Recipients  Carson Negron MD  6/7/2018  8:53 AM  Sign at close encounter  Assessment/Plan:    Macromastia  78-year-old female status post gastric bypass surgery with massive weight loss who presents with large pendulous breasts that she believes be causing her neck pain, back pain, bra strap grooving and rashes beneath her breasts  She presents in consultation for breast reduction  She has symptomatic macromastia  I have recommended bilateral breast reduction to alleviate her symptoms  I have discussed with her the risks, benefits and alternatives of the procedure including but not limited to risks of bleeding, infection, characteristic inverted T pattern scar, seroma and possibility of loss of nipple sensation  She understands these risks and wishes to proceed  I will write a letter of medical necessity and pending prior authorization we will schedule her for surgery  There are no diagnoses linked to this encounter  Subjective:      Patient ID: Álvaro Ellison is a 53 y o  female  78-year-old female status post gastric bypass surgery with massive weight loss who presents with large pendulous breasts that she believes be causing her neck pain, back pain, bra strap grooving and rashes beneath her breasts  She presents in consultation for breast reduction          The following portions of the patient's history were reviewed and updated as appropriate: allergies, current medications, past family history, past medical history, past social history, past surgical history and problem list     Review of Systems   Constitutional: Negative  HENT: Negative  Eyes: Negative  Respiratory: Negative  Cardiovascular: Negative  Gastrointestinal: Negative  Endocrine: Negative  Genitourinary: Negative  Musculoskeletal:        As noted in HPI   Skin:        As noted in HPI   Allergic/Immunologic: Negative  Neurological: Negative  Hematological: Negative  Psychiatric/Behavioral: Negative  Objective:      Ht 5' 1" (1 549 m)   Wt 66 2 kg (146 lb)   BMI 27 59 kg/m²           Physical Exam   Constitutional: She is oriented to person, place, and time  She appears well-developed and well-nourished  HENT:   Head: Normocephalic and atraumatic  Eyes: Conjunctivae are normal  Pupils are equal, round, and reactive to light  Neck: Normal range of motion  Cardiovascular: Normal rate and regular rhythm  Pulmonary/Chest: Effort normal and breath sounds normal        Abdominal: Soft  Bowel sounds are normal    Musculoskeletal: Normal range of motion  Neurological: She is alert and oriented to person, place, and time  Skin: Skin is warm and dry  Psychiatric: She has a normal mood and affect   Her behavior is normal

## 2018-05-18 ENCOUNTER — OFFICE VISIT (OUTPATIENT)
Dept: BARIATRICS | Facility: CLINIC | Age: 48
End: 2018-05-18
Payer: COMMERCIAL

## 2018-05-18 VITALS
TEMPERATURE: 98 F | HEIGHT: 61 IN | SYSTOLIC BLOOD PRESSURE: 106 MMHG | HEART RATE: 72 BPM | DIASTOLIC BLOOD PRESSURE: 62 MMHG | BODY MASS INDEX: 27.38 KG/M2 | WEIGHT: 145 LBS | RESPIRATION RATE: 18 BRPM

## 2018-05-18 DIAGNOSIS — E78.5 DYSLIPIDEMIA: ICD-10-CM

## 2018-05-18 DIAGNOSIS — Z98.84 BARIATRIC SURGERY STATUS: Primary | ICD-10-CM

## 2018-05-18 DIAGNOSIS — K21.9 GASTROESOPHAGEAL REFLUX DISEASE, ESOPHAGITIS PRESENCE NOT SPECIFIED: ICD-10-CM

## 2018-05-18 DIAGNOSIS — R79.89 LOW SERUM VITAMIN A: ICD-10-CM

## 2018-05-18 DIAGNOSIS — K91.2 POSTSURGICAL MALABSORPTION: ICD-10-CM

## 2018-05-18 PROBLEM — M72.2 PLANTAR FASCIITIS: Status: ACTIVE | Noted: 2018-05-18

## 2018-05-18 PROBLEM — M54.9 CHRONIC BACK PAIN: Status: ACTIVE | Noted: 2018-05-18

## 2018-05-18 PROBLEM — G89.29 CHRONIC BACK PAIN: Status: ACTIVE | Noted: 2018-05-18

## 2018-05-18 PROCEDURE — 99214 OFFICE O/P EST MOD 30 MIN: CPT | Performed by: PHYSICIAN ASSISTANT

## 2018-05-18 NOTE — PATIENT INSTRUCTIONS
Can try to take omeprazole every other day for one month -then every 3 days for one month  If you are not having abdominal discomfort or heart burn you can stop it  If you need it, you can continue to take it  Follow-up in 6 months  We kindly ask that your arrive 15 minutes before your scheduled appointment time with your provider to allow our staff to room you, get your vital signs and update your chart  Follow diet as discussed  Get lab work done prior to next office visit  It is recommended to check with your insurance BEFORE getting labs done to make sure they are covered by your policy  Make sure to HOLD any multivitamins that may contain biotin and any biotin supplements FOR 5 DAYS before any labs since it can affect the results  Follow vitamin  and mineral recommendations as reviewed with you  Exercise as tolerated    Call our office if you have any problems with abdominal pain especially associated with fever, chills, nausea, vomiting or any other concerns  All  Post-bariatric surgery patients should be aware that very small quantities of any alcohol  can cause impairment and it is very possible not to feel the effect  The effect can be in the system for several hours  It is also a stomach irritant  It is advised to AVOID alcohol, Nonsteroidal antiinflammatory drugs (NSAIDS) and nicotine of all forms   Any of these can cause stomach irritation/pain

## 2018-05-18 NOTE — ASSESSMENT & PLAN NOTE
Vitamin A level is only marginally low-advised on adding high vitamin A foods in diet daily-advised on food sources-this will be rechecked with routine labs

## 2018-05-18 NOTE — ASSESSMENT & PLAN NOTE
-s/p Lauro-En-Y Gastric Bypass with Dr Keesha Reeves on 11/6/2017  Overall doing Well  She notes she is interested in plastic surgery repair-advised we generally recommend waiting until she is 1 1/2 years post-op---    Initial: 212 5 lb  Current: 145 lb  EWL: 84%   Brandon:Current  Current BMI is Body mass index is 27 4 kg/m²  Tolerating a regular diet-yes  Eating at least 60 grams of protein per day-yes  Following 30/60 minute rule with liquids-yes  Drinking at least 64 ounces of fluid per day-yes  Drinking carbonated beverages-no  Sufficient exercise-yes  Using NSAIDs regularly-no  Using nicotine-no  Using alcohol-no    Advised on tapering off her ppi now-she should not need this long-term but if she needs it, would consider it daily until I see her back in 6 months

## 2018-05-18 NOTE — PROGRESS NOTES
Assessment/Plan:    Low serum vitamin A  Vitamin A level is only marginally low-advised on adding high vitamin A foods in diet daily-advised on food sources-this will be rechecked with routine labs    GERD (gastroesophageal reflux disease)  Controlled on daily ppi-advised on gradual taper to off as tolerated  Bariatric surgery status  -s/p Lauro-En-Y Gastric Bypass with Dr Richie Stevenson on 11/6/2017  Overall doing Well  She notes she is interested in plastic surgery repair-advised we generally recommend waiting until she is 1 1/2 years post-op---    Initial: 212 5 lb  Current: 145 lb  EWL: 84%   Brandon:Current  Current BMI is Body mass index is 27 4 kg/m²  Tolerating a regular diet-yes  Eating at least 60 grams of protein per day-yes  Following 30/60 minute rule with liquids-yes  Drinking at least 64 ounces of fluid per day-yes  Drinking carbonated beverages-no  Sufficient exercise-yes  Using NSAIDs regularly-no  Using nicotine-no  Using alcohol-no    Advised on tapering off her ppi now-she should not need this long-term but if she needs it, would consider it daily until I see her back in 6 months  Postsurgical malabsorption  -At risk for malabsorption of vitamins/minerals secondary to malabsorption and restriction of intake from their procedure  -Currently taking adequate postop bariatric surgery vitamin supplementation-yes  -Last set of bariatric labs completed on May 2018and are not within acceptable limits   -Next set of bariatric labs ordered for approximately 6 months     She is taking [facility] mvi with 45 mg of iron and 3-500 mg calcium citrate with D and biotin daily        Dyslipidemia  Improved/corrected with her excellent weight loss       Diagnoses and all orders for this visit:    Bariatric surgery status  -     Comprehensive metabolic panel; Future  -     CBC and differential; Future  -     Vitamin B12; Future  -     Vitamin A; Future  -     PTH, intact;  Future  -     Vitamin B1, whole blood; Future  -     Vitamin D 25 hydroxy; Future  -     Folate; Future  -     Ferritin; Future  -     Iron Saturation %; Future    Postsurgical malabsorption  -     Comprehensive metabolic panel; Future  -     CBC and differential; Future  -     Vitamin B12; Future  -     Vitamin A; Future  -     PTH, intact; Future  -     Vitamin B1, whole blood; Future  -     Vitamin D 25 hydroxy; Future  -     Folate; Future  -     Ferritin; Future  -     Iron Saturation %; Future    Gastroesophageal reflux disease, esophagitis presence not specified    Low serum vitamin A  -     Vitamin A; Future    Dyslipidemia         Subjective:      Patient ID: Ragini Gary is a 53 y o  female  She is here in routine follow-up  Tolerating a regular diet  She is taking bariatric supplements and exercising regularly  She is interested in plastic surgery repair for excess skin as she notes she has chronic low back pain from her breasts  She notes she had consultation with plastic surgeon who advised her to wait a few more months        The following portions of the patient's history were reviewed and updated as appropriate: allergies, current medications, past family history, past medical history, past social history, past surgical history and problem list     Review of Systems   Constitutional: Negative for chills and fever  Unexpected weight change: planned weight loss  Respiratory: Negative for shortness of breath and wheezing  Cardiovascular: Positive for palpitations  Negative for chest pain  Notes she has seconds of palpitations at times-not with meals/advised if this becomes bothersome to follow-up with pcp*   Gastrointestinal: Negative for abdominal pain, constipation, diarrhea, nausea and vomiting  Psychiatric/Behavioral: Suicidal ideas: no complait of anxiety or depression           Objective:      /62   Pulse 72   Temp 98 °F (36 7 °C)   Resp 18   Ht 5' 1" (1 549 m)   Wt 65 8 kg (145 lb)   BMI 27 40 kg/m²          Physical Exam   Constitutional: She is oriented to person, place, and time  She appears well-developed and well-nourished  HENT:   Mouth/Throat: Oropharynx is clear and moist    Eyes: Conjunctivae are normal  No scleral icterus  Cardiovascular: Normal rate and regular rhythm  Pulmonary/Chest: Effort normal and breath sounds normal    Abdominal: Soft  There is no tenderness  No incisional hernias appreciated   Musculoskeletal:   Normal gait   Neurological: She is alert and oriented to person, place, and time  Psychiatric: She has a normal mood and affect  Her behavior is normal  Judgment and thought content normal    Nursing note and vitals reviewed  GOALS: Maintain weight loss with good nutrition intakes    Normal vitamin and mineral levels  Exercise as tolerated    BARRIERS: none identified

## 2018-05-18 NOTE — ASSESSMENT & PLAN NOTE
-At risk for malabsorption of vitamins/minerals secondary to malabsorption and restriction of intake from their procedure  -Currently taking adequate postop bariatric surgery vitamin supplementation-yes  -Last set of bariatric labs completed on May 2018and are not within acceptable limits   -Next set of bariatric labs ordered for approximately 6 months     She is taking [facility] mvi with 45 mg of iron and 3-500 mg calcium citrate with D and biotin daily

## 2018-06-07 PROBLEM — N62 MACROMASTIA: Status: ACTIVE | Noted: 2018-06-07

## 2018-06-07 NOTE — ASSESSMENT & PLAN NOTE
46-year-old female status post gastric bypass surgery with massive weight loss who presents with large pendulous breasts that she believes be causing her neck pain, back pain, bra strap grooving and rashes beneath her breasts  She presents in consultation for breast reduction  She has symptomatic macromastia  I have recommended bilateral breast reduction to alleviate her symptoms  I have discussed with her the risks, benefits and alternatives of the procedure including but not limited to risks of bleeding, infection, characteristic inverted T pattern scar, seroma and possibility of loss of nipple sensation  She understands these risks and wishes to proceed  I will write a letter of medical necessity and pending prior authorization we will schedule her for surgery

## 2018-06-07 NOTE — PROGRESS NOTES
Assessment/Plan:    Macromastia  52-year-old female status post gastric bypass surgery with massive weight loss who presents with large pendulous breasts that she believes be causing her neck pain, back pain, bra strap grooving and rashes beneath her breasts  She presents in consultation for breast reduction  She has symptomatic macromastia  I have recommended bilateral breast reduction to alleviate her symptoms  I have discussed with her the risks, benefits and alternatives of the procedure including but not limited to risks of bleeding, infection, characteristic inverted T pattern scar, seroma and possibility of loss of nipple sensation  She understands these risks and wishes to proceed  I will write a letter of medical necessity and pending prior authorization we will schedule her for surgery  Abdominal pannus  52-year-old female status post massive weight loss after gastric bypass surgery who presents with a hanging abdominal pannus  The pannus causes rashing and irritation in the folds of skin under the pannus  It is worse when the weather is warm  It interferes with her exercise regimen  The requires use of powders and creams  I recommended panniculectomy to alleviate her symptoms  I discussed with her the risks, benefits and alternatives of the procedure including not limited to risk of bleeding, infection, scar, seroma and wound dehiscence  She understands these risks and wishes to proceed  I personally obtained her informed consent  Diagnoses and all orders for this visit:    Macromastia    Abdominal pannus          Subjective:      Patient ID: Madhu Garduno is a 53 y o  female  52-year-old female status post gastric bypass surgery with massive weight loss who presents with large pendulous breasts that she believes be causing her neck pain, back pain, bra strap grooving and rashes beneath her breasts  She presents in consultation for breast reduction    She also presents with a hanging abdominal pannus  The pannus causes rashing and irritation in the folds of skin under the pannus  It is worse when the weather is warm  It interferes with her exercise regimen  The requires use of powders and creams  The following portions of the patient's history were reviewed and updated as appropriate: allergies, current medications, past family history, past medical history, past social history, past surgical history and problem list     Review of Systems   Constitutional: Negative  HENT: Negative  Eyes: Negative  Respiratory: Negative  Cardiovascular: Negative  Gastrointestinal: Negative  Endocrine: Negative  Genitourinary: Negative  Musculoskeletal:        As noted in HPI   Skin:        As noted in HPI   Allergic/Immunologic: Negative  Neurological: Negative  Hematological: Negative  Psychiatric/Behavioral: Negative  Objective:      Ht 5' 1" (1 549 m)   Wt 66 2 kg (146 lb)   BMI 27 59 kg/m²          Physical Exam   Constitutional: She is oriented to person, place, and time  She appears well-developed and well-nourished  HENT:   Head: Normocephalic and atraumatic  Eyes: Conjunctivae are normal  Pupils are equal, round, and reactive to light  Neck: Normal range of motion  Cardiovascular: Normal rate and regular rhythm  Pulmonary/Chest: Effort normal and breath sounds normal        Abdominal: Soft  Bowel sounds are normal    The pannus hangs below the level of the pubis and covers genitals  Stigmata of irritation in the folds with hyperpigmentation   Musculoskeletal: Normal range of motion  Neurological: She is alert and oriented to person, place, and time  Skin: Skin is warm and dry  Psychiatric: She has a normal mood and affect   Her behavior is normal

## 2018-06-12 ENCOUNTER — APPOINTMENT (OUTPATIENT)
Dept: LAB | Facility: HOSPITAL | Age: 48
End: 2018-06-12
Payer: COMMERCIAL

## 2018-06-12 ENCOUNTER — ANNUAL EXAM (OUTPATIENT)
Dept: FAMILY MEDICINE CLINIC | Facility: CLINIC | Age: 48
End: 2018-06-12
Payer: COMMERCIAL

## 2018-06-12 VITALS
WEIGHT: 147.6 LBS | DIASTOLIC BLOOD PRESSURE: 70 MMHG | RESPIRATION RATE: 16 BRPM | BODY MASS INDEX: 27.16 KG/M2 | SYSTOLIC BLOOD PRESSURE: 116 MMHG | HEART RATE: 68 BPM | TEMPERATURE: 96.5 F | HEIGHT: 62 IN

## 2018-06-12 DIAGNOSIS — N95.1 PERIMENOPAUSE: ICD-10-CM

## 2018-06-12 DIAGNOSIS — Z01.419 ENCOUNTER FOR ROUTINE GYNECOLOGICAL EXAMINATION WITH PAPANICOLAOU SMEAR OF CERVIX: Primary | ICD-10-CM

## 2018-06-12 DIAGNOSIS — R10.2 ACUTE PELVIC PAIN, FEMALE: ICD-10-CM

## 2018-06-12 DIAGNOSIS — Z11.3 SCREENING FOR STD (SEXUALLY TRANSMITTED DISEASE): ICD-10-CM

## 2018-06-12 DIAGNOSIS — N62 MACROMASTIA: ICD-10-CM

## 2018-06-12 PROCEDURE — 36415 COLL VENOUS BLD VENIPUNCTURE: CPT

## 2018-06-12 PROCEDURE — 87591 N.GONORRHOEAE DNA AMP PROB: CPT | Performed by: FAMILY MEDICINE

## 2018-06-12 PROCEDURE — 86803 HEPATITIS C AB TEST: CPT

## 2018-06-12 PROCEDURE — 87389 HIV-1 AG W/HIV-1&-2 AB AG IA: CPT

## 2018-06-12 PROCEDURE — G0145 SCR C/V CYTO,THINLAYER,RESCR: HCPCS | Performed by: FAMILY MEDICINE

## 2018-06-12 PROCEDURE — 87340 HEPATITIS B SURFACE AG IA: CPT

## 2018-06-12 PROCEDURE — 87624 HPV HI-RISK TYP POOLED RSLT: CPT | Performed by: FAMILY MEDICINE

## 2018-06-12 PROCEDURE — 99396 PREV VISIT EST AGE 40-64: CPT | Performed by: FAMILY MEDICINE

## 2018-06-12 PROCEDURE — 86592 SYPHILIS TEST NON-TREP QUAL: CPT

## 2018-06-12 PROCEDURE — 87491 CHLMYD TRACH DNA AMP PROBE: CPT | Performed by: FAMILY MEDICINE

## 2018-06-12 NOTE — PROGRESS NOTES
Mitch Guerrero 1970 female MRN: [id number]    Family Medicine Annual GYN Visit    ASSESSMENT/PLAN  Problem List Items Addressed This Visit     Perimenopause     Mild hot flashes - treating with Amberen natural supplement otc  Advised Pt to follow up with office if her sx worsen and she desires hormonal vs SSRI therapy           Acute pelvic pain, female     Improved  No TVUS required at this time  Instructed Pt to follow up if sx recur or worsen         Macromastia     Pt likely to have breast reduction in Nov 2018  She will likely need pre-op visit         Encounter for routine gynecological examination with Papanicolaou smear of cervix - Primary     Pap with HPV cotesting today  Mammogram Femi Kim in March 2018 - repeat in March 2019  STD testing at Pt's request  Recommended coconut oil or lubricant during intercourse to help with the vaginal burning she is experiencing after intercourse         Relevant Orders    Liquid-based pap, screening      Other Visit Diagnoses     Screening for STD (sexually transmitted disease)        Relevant Orders    HIV 1/2 AG-AB combo    Chlamydia/GC amplified DNA by PCR    RPR    Hepatitis C antibody    Hepatitis B surface antigen        F/U in 6mo after breast reduction and also likely before for pre-op visit      Future Appointments  Date Time Provider Radha Miles   11/20/2018 2:00 PM Yue Roberson PA-C [facility]          SUBJECTIVE  CC: Gynecologic Exam      HPI:  Mitch Guerrero is a 53 y o  female who presents for gyn HM      Jose Manuel Camp presents for her routine gynecologic HM  She is perimenopausal at this time, her periods last 2-3d every 4mo starting 1y ago, very light  Monthly cramping, back pain, tenderness still as if she was on her period  1y ago she started with hot flashes - amberen over the counter medication helps her sx  Sexually active with her  - monogamous  No dyspareunia, but she does have burning sensation after intercourse - long history of this  Does not use any lubricant  Decreased libido, affecting her relationship - increased stress with her family issues    For past 3-4mo she has been experiencing b/l lower pelvic pain 2 weeks prior to her periods    Dieting and exercising more after her bariatric surgery  Quit smoking 4y ago  Stopped drinking after bariatric surgery  Feels safe at home    Pregnant 3 times, 3 daughters, 3 grandkids    Maternal aunt - breast ca - 54yo  No other cancer history          Gynecologic History  OB History     No data available        No LMP recorded  Patient is perimenopausal   Contraception: condoms  Last Pap: unknown - several years ago   Results were: normal, per Pt  Last mammogram: March 2018  Results were: [facility] 1        Review of Systems   Constitutional: Negative for chills, fatigue and fever  HENT: Negative for ear pain and sore throat  Respiratory: Negative for cough and shortness of breath  Cardiovascular: Negative for chest pain and palpitations  Gastrointestinal: Negative for abdominal pain, constipation, diarrhea, nausea and vomiting  Genitourinary:        As per HPI   Musculoskeletal: Positive for back pain (Plans to have breast reduction in Nov 2018)  Skin: Negative for rash  Psychiatric/Behavioral: Negative for dysphoric mood         Historical Information   The patient history was reviewed as follows:    Past Medical History:   Diagnosis Date    Achilles tendinitis     right     Back pain     Carpal tunnel syndrome     Last Assessed: 11/16/2016    Depression     Generalized obesity     Last Assessed: 10/5/2016    GERD (gastroesophageal reflux disease)     Hyperlipidemia     Multiple thyroid nodules     Palpitations     Last Assessed: 4/29/2013    Plantar fasciitis     bilateral    Positive PPD     Last Assessed: 10/21/2014    Tendonitis, Achilles     Last Assessed: 3/3/2017    Varicose vein of leg     Viral warts     Last Assessed: 4/29/2013     Past Surgical History:   Procedure Laterality Date    CARPAL TUNNEL RELEASE       SECTION      x3    ESOPHAGOGASTRODUODENOSCOPY N/A 2017    Procedure: ESOPHAGOGASTRODUODENOSCOPY (EGD); Surgeon: Raquel Borjas MD;  [facility];  Service: Bariatrics    NOSE SURGERY      PARAESOPHAGEAL HERNIA REPAIR N/A 2017    Procedure: REPAIR HERNIA PARAESOPHAGEAL  LAPAROSCOPIC;  Surgeon: Raquel Borjas MD;  [facility];  Service: Bariatrics    CT EGD TRANSORAL BIOPSY SINGLE/MULTIPLE N/A 2017    Procedure: ESOPHAGOGASTRODUODENOSCOPY (EGD) with biopsy;  Surgeon: Raquel Borjas MD;  [facility];   Service: Bariatrics    CT LAP GASTRIC BYPASS/MARCIN-EN-Y N/A 2017    Procedure: BYPASS GASTRIC  MARCIN-EN-Y LAPAROSCOPIC;  Surgeon: Raqeul Borjas MD;  [facility];  Service: Bariatrics    CT WRIST Evette Mourning LIG Right 2017    Procedure: ENDOSCOPIC CARPAL TUNNEL RELEASE ;  Surgeon: Paty Bashir MD;  [facility];  Service: Orthopedics    CT WRIST Evette Mourning LIG Left 2017    Procedure: ENDOSCOPIC CARPAL TUNNEL RELEASE;  Surgeon: Paty Bashir MD;  [facility];  Service: Orthopedics    TONSILLECTOMY      TUBAL LIGATION      WISDOM TOOTH EXTRACTION       Family History   Problem Relation Age of Onset    Diabetes Mother     Heart disease Mother     Stroke Mother      Syndrome    Heart disease Father     Hypertension Father     Kidney disease Father     Stroke Maternal Grandmother      Syndrome    Alzheimer's disease Maternal Grandfather     Arthritis Maternal Aunt     Diabetes Maternal Aunt     Hypertension Maternal Aunt       Social History       Medications:     Current Outpatient Prescriptions:     Biotin w/ Vitamins C & E (HAIR/SKIN/NAILS) 1250-7 5-7 5 MCG-MG-UNT CHEW, Chew 1 tablet 2 (two) times a day  , Disp: , Rfl:     Calcium 600 MG tablet, Take 1 tablet by mouth 3 (three) times a day, Disp: , Rfl:   Calcium Citrate-Vitamin D (CALCIUM CITRATE+D3 PO), Take 1 tablet by mouth 3 (three) times a day, Disp: , Rfl:     [employer] PAIN RELIEF ADULT 500 MG/15ML LIQD, SWALLOW 20 ML EVERY 8 HOURS AS NEEDED, Disp: , Rfl: 1    cyanocobalamin (VITAMIN B-12) 100 mcg tablet, by Does not apply route, Disp: , Rfl:     cyanocobalamin 1000 MCG tablet, Take 100 mcg by mouth daily, Disp: , Rfl:     DAILY MULTIPLE VITAMINS/IRON PO, Take by mouth, Disp: , Rfl:     gabapentin (NEURONTIN) 300 mg capsule, Take 600 mg by mouth 2 (two) times a day  , Disp: , Rfl:     Gabapentin 300 MG/6ML SOLN, TAKE 6ML BY MOUTH IN THE MORNING AND 18ML BY MOUTH AT BEDTIME, Disp: , Rfl: 1    Melatonin 10 MG TABS, Take 10 mg by mouth daily at bedtime as needed, Disp: , Rfl:     metoclopramide (REGLAN) 5 mg tablet, TAKE 1 TABLET 4 TIMES DAILY AS NEED FOR NAUSEA, Disp: , Rfl: 0    multivitamin (THERAGRAN) TABS, Take 1 tablet by mouth daily Bariatric vitamin , Disp: , Rfl:     omeprazole (PriLOSEC) 20 mg delayed release capsule, Take 1 capsule by mouth Daily, Disp: , Rfl:     ondansetron (ZOFRAN-ODT) 4 mg disintegrating tablet, DISSOLVE 1 TABLET EVERY 6 HOURS AS NEEDED FOR NAUSEA, Disp: , Rfl: 0    terbinafine (LamISIL) 250 mg tablet, Take 1 tablet (250 mg total) by mouth daily for 90 days, Disp: 90 tablet, Rfl: 0    Turmeric 1053 MG TABS, Take 2 tablets by mouth daily  , Disp: , Rfl:     Turmeric 500 MG CAPS, Take 2 capsules by mouth 2 (two) times a day, Disp: , Rfl:     Allergies   Allergen Reactions    Latex Itching       OBJECTIVE  Vitals:   Vitals:    06/12/18 0849   BP: 116/70   Pulse: 68   Resp: 16   Temp: (!) 96 5 °F (35 8 °C)   Weight: 67 kg (147 lb 9 6 oz)   Height: 5' 1 9" (1 572 m)     Wt Readings from Last 3 Encounters:   06/12/18 67 kg (147 lb 9 6 oz)   05/18/18 65 8 kg (145 lb)   05/17/18 66 2 kg (146 lb)     Body mass index is 27 08 kg/m²  Physical Exam   Constitutional: She is oriented to person, place, and time   She appears well-developed and well-nourished  No distress  HENT:   Head: Normocephalic and atraumatic  Eyes: Conjunctivae and EOM are normal  Right eye exhibits no discharge  Left eye exhibits no discharge  No scleral icterus  Neck: Normal range of motion  Neck supple  No tracheal deviation present  Cardiovascular: Normal rate, regular rhythm, normal heart sounds and intact distal pulses  Exam reveals no gallop and no friction rub  No murmur heard  Pulmonary/Chest: Effort normal and breath sounds normal  No respiratory distress  She has no wheezes  She has no rales  Abdominal: Soft  Bowel sounds are normal  She exhibits no distension  There is no tenderness  There is no rebound and no guarding  Musculoskeletal: She exhibits no edema or deformity  Lymphadenopathy:     She has no cervical adenopathy  Neurological: She is alert and oriented to person, place, and time  She exhibits normal muscle tone  Skin: Skin is warm and dry  No rash noted  She is not diaphoretic  No erythema  Psychiatric: She has a normal mood and affect  Her behavior is normal  Judgment and thought content normal    Vitals reviewed  Lab:  I have personally reviewed all pertinent results       Appointment on 04/21/2018   Component Date Value Ref Range Status    WBC 04/21/2018 6 23  4 31 - 10 16 Thousand/uL Final    RBC 04/21/2018 4 41  3 81 - 5 12 Million/uL Final    Hemoglobin 04/21/2018 14 1  11 5 - 15 4 g/dL Final    Hematocrit 04/21/2018 41 3  34 8 - 46 1 % Final    MCV 04/21/2018 94  82 - 98 fL Final    MCH 04/21/2018 32 0  26 8 - 34 3 pg Final    MCHC 04/21/2018 34 1  31 4 - 37 4 g/dL Final    RDW 04/21/2018 12 7  11 6 - 15 1 % Final    MPV 04/21/2018 9 9  8 9 - 12 7 fL Final    Platelets 81/64/8958 223  149 - 390 Thousands/uL Final    nRBC 04/21/2018 0  /100 WBCs Final    Neutrophils Relative 04/21/2018 58  43 - 75 % Final    Lymphocytes Relative 04/21/2018 32  14 - 44 % Final    Monocytes Relative 04/21/2018 7  4 - 12 % Final    Eosinophils Relative 04/21/2018 3  0 - 6 % Final    Basophils Relative 04/21/2018 0  0 - 1 % Final    Neutrophils Absolute 04/21/2018 3 61  1 85 - 7 62 Thousands/µL Final    Lymphocytes Absolute 04/21/2018 2 00  0 60 - 4 47 Thousands/µL Final    Monocytes Absolute 04/21/2018 0 42  0 17 - 1 22 Thousand/µL Final    Eosinophils Absolute 04/21/2018 0 17  0 00 - 0 61 Thousand/µL Final    Basophils Absolute 04/21/2018 0 02  0 00 - 0 10 Thousands/µL Final    Cholesterol 04/21/2018 157  50 - 200 mg/dL Final      Cholesterol:       Desirable         <200 mg/dl       Borderline         200-239 mg/dl       High              >239           Triglycerides 04/21/2018 99  <=150 mg/dL Final    Specimen collection should occur prior to N-Acetylcysteine or Metamizole administration due to the potential for falsely depressed results   HDL, Direct 04/21/2018 46  40 - 60 mg/dL Final      HDL Cholesterol:       High    >59 mg/dL       Low     <41 mg/dL    LDL Calculated 04/21/2018 91  0 - 100 mg/dL Final      This screening LDL is a calculated result  It does not have the accuracy of the Direct Measured LDL in the monitoring of patients with hyperlipidemia and/or statin therapy  Direct Measure LDL (TPL941) must be ordered separately in these patients   Non-HDL-Chol (CHOL-HDL) 04/21/2018 111  mg/dl Final    Vitamin B-12 04/21/2018 1174* 100 - 900 pg/mL Final    Vitamin A 04/21/2018 33 0* 33 1 - 100 0 ug/dL Final    Reference intervals for vitamin A determined from [facility] and  Nutrition Examination Survey, 2358-2590  Individuals with vitamin A  less than 20 ug/dL are considered vitamin A deficient and those with  serum concentrations less than 10 ug/dL are considered severely  deficient  This test was developed and its performance characteristics  determined by [facility]  It has not been cleared or approved  by the Food and Drug Administration              **Please note reference interval change**    PTH 04/21/2018 48 2  18 4 - 80 1 pg/mL Final    Vitamin B1, Whole Blood 04/21/2018 160 7  66 5 - 200 0 nmol/L Final    This test was developed and its performance characteristics  determined by [facility]  It has not been cleared or approved  by the Food and Drug Administration   Vit D, 25-Hydroxy 04/21/2018 39 2  30 0 - 100 0 ng/mL Final    Folate 04/21/2018 19 4* 3 1 - 17 5 ng/mL Final    Ferritin 04/21/2018 73  8 - 388 ng/mL Final    Iron Saturation 04/21/2018 27  % Final    TIBC 04/21/2018 324  250 - 450 ug/dL Final    Iron 04/21/2018 87  50 - 170 ug/dL Final      Patients treated with metal-binding drugs (ie  Deferoxamine) may have depressed iron values   Sodium 04/21/2018 141  136 - 145 mmol/L Final    Potassium 04/21/2018 4 4  3 5 - 5 3 mmol/L Final    Chloride 04/21/2018 106  100 - 108 mmol/L Final    CO2 04/21/2018 31  21 - 32 mmol/L Final    Anion Gap 04/21/2018 4  4 - 13 mmol/L Final    BUN 04/21/2018 14  5 - 25 mg/dL Final    Creatinine 04/21/2018 0 64  0 60 - 1 30 mg/dL Final    Standardized to IDMS reference method    Glucose, Fasting 04/21/2018 84  65 - 99 mg/dL Final      Specimen collection should occur prior to Sulfasalazine administration due to the potential for falsely depressed results  Specimen collection should occur prior to Sulfapyridine administration due to the potential for falsely elevated results      Calcium 04/21/2018 9 4  8 3 - 10 1 mg/dL Final    eGFR 04/21/2018 107  ml/min/1 73sq m Final       Appointment on 04/21/2018   Component Date Value Ref Range Status    WBC 04/21/2018 6 23  4 31 - 10 16 Thousand/uL Final    RBC 04/21/2018 4 41  3 81 - 5 12 Million/uL Final    Hemoglobin 04/21/2018 14 1  11 5 - 15 4 g/dL Final    Hematocrit 04/21/2018 41 3  34 8 - 46 1 % Final    MCV 04/21/2018 94  82 - 98 fL Final    MCH 04/21/2018 32 0  26 8 - 34 3 pg Final    MCHC 04/21/2018 34 1  31 4 - 37 4 g/dL Final    RDW 04/21/2018 12 7  11 6 - 15 1 % Final    MPV 04/21/2018 9 9  8 9 - 12 7 fL Final    Platelets 64/18/6851 223  149 - 390 Thousands/uL Final    nRBC 04/21/2018 0  /100 WBCs Final    Neutrophils Relative 04/21/2018 58  43 - 75 % Final    Lymphocytes Relative 04/21/2018 32  14 - 44 % Final    Monocytes Relative 04/21/2018 7  4 - 12 % Final    Eosinophils Relative 04/21/2018 3  0 - 6 % Final    Basophils Relative 04/21/2018 0  0 - 1 % Final    Neutrophils Absolute 04/21/2018 3 61  1 85 - 7 62 Thousands/µL Final    Lymphocytes Absolute 04/21/2018 2 00  0 60 - 4 47 Thousands/µL Final    Monocytes Absolute 04/21/2018 0 42  0 17 - 1 22 Thousand/µL Final    Eosinophils Absolute 04/21/2018 0 17  0 00 - 0 61 Thousand/µL Final    Basophils Absolute 04/21/2018 0 02  0 00 - 0 10 Thousands/µL Final    Cholesterol 04/21/2018 157  50 - 200 mg/dL Final      Cholesterol:       Desirable         <200 mg/dl       Borderline         200-239 mg/dl       High              >239           Triglycerides 04/21/2018 99  <=150 mg/dL Final    Specimen collection should occur prior to N-Acetylcysteine or Metamizole administration due to the potential for falsely depressed results   HDL, Direct 04/21/2018 46  40 - 60 mg/dL Final      HDL Cholesterol:       High    >59 mg/dL       Low     <41 mg/dL    LDL Calculated 04/21/2018 91  0 - 100 mg/dL Final      This screening LDL is a calculated result  It does not have the accuracy of the Direct Measured LDL in the monitoring of patients with hyperlipidemia and/or statin therapy  Direct Measure LDL (QJE568) must be ordered separately in these patients   Non-HDL-Chol (CHOL-HDL) 04/21/2018 111  mg/dl Final    Vitamin B-12 04/21/2018 1174* 100 - 900 pg/mL Final    Vitamin A 04/21/2018 33 0* 33 1 - 100 0 ug/dL Final    Reference intervals for vitamin A determined from [facility] and  Nutrition Examination Survey, 2702-8889   Individuals with vitamin A  less than 20 ug/dL are considered vitamin A deficient and those with  serum concentrations less than 10 ug/dL are considered severely  deficient  This test was developed and its performance characteristics  determined by [facility]  It has not been cleared or approved  by the Food and Drug Administration  **Please note reference interval change**    PTH 04/21/2018 48 2  18 4 - 80 1 pg/mL Final    Vitamin B1, Whole Blood 04/21/2018 160 7  66 5 - 200 0 nmol/L Final    This test was developed and its performance characteristics  determined by [facility]  It has not been cleared or approved  by the Food and Drug Administration   Vit D, 25-Hydroxy 04/21/2018 39 2  30 0 - 100 0 ng/mL Final    Folate 04/21/2018 19 4* 3 1 - 17 5 ng/mL Final    Ferritin 04/21/2018 73  8 - 388 ng/mL Final    Iron Saturation 04/21/2018 27  % Final    TIBC 04/21/2018 324  250 - 450 ug/dL Final    Iron 04/21/2018 87  50 - 170 ug/dL Final      Patients treated with metal-binding drugs (ie  Deferoxamine) may have depressed iron values   Sodium 04/21/2018 141  136 - 145 mmol/L Final    Potassium 04/21/2018 4 4  3 5 - 5 3 mmol/L Final    Chloride 04/21/2018 106  100 - 108 mmol/L Final    CO2 04/21/2018 31  21 - 32 mmol/L Final    Anion Gap 04/21/2018 4  4 - 13 mmol/L Final    BUN 04/21/2018 14  5 - 25 mg/dL Final    Creatinine 04/21/2018 0 64  0 60 - 1 30 mg/dL Final    Standardized to IDMS reference method    Glucose, Fasting 04/21/2018 84  65 - 99 mg/dL Final      Specimen collection should occur prior to Sulfasalazine administration due to the potential for falsely depressed results  Specimen collection should occur prior to Sulfapyridine administration due to the potential for falsely elevated results   Calcium 04/21/2018 9 4  8 3 - 10 1 mg/dL Final    eGFR 04/21/2018 107  ml/min/1 73sq m Final       Imaging:  I have personally reviewed all pertinent results          Eldon Crooks DO, PGY-3  [facility]   6/12/2018

## 2018-06-12 NOTE — ASSESSMENT & PLAN NOTE
Mild hot flashes - treating with Amberen natural supplement otc   Advised Pt to follow up with office if her sx worsen and she desires hormonal vs SSRI therapy

## 2018-06-12 NOTE — ASSESSMENT & PLAN NOTE
Pap with HPV cotesting today  Mammogram Derrell Loza in March 2018 - repeat in March 2019  STD testing at Pt's request  Recommended coconut oil or lubricant during intercourse to help with the vaginal burning she is experiencing after intercourse

## 2018-06-13 LAB
CHLAMYDIA DNA CVX QL NAA+PROBE: NORMAL
HBV SURFACE AG SER QL: NORMAL
HCV AB SER QL: NORMAL
HIV 1+2 AB+HIV1 P24 AG SERPL QL IA: NORMAL
N GONORRHOEA DNA GENITAL QL NAA+PROBE: NORMAL
RPR SER QL: NORMAL

## 2018-06-14 PROBLEM — E65 ABDOMINAL PANNUS: Status: ACTIVE | Noted: 2018-06-14

## 2018-06-14 LAB — HPV RRNA GENITAL QL NAA+PROBE: NORMAL

## 2018-06-14 NOTE — ASSESSMENT & PLAN NOTE
41-year-old female status post massive weight loss after gastric bypass surgery who presents with a hanging abdominal pannus  The pannus causes rashing and irritation in the folds of skin under the pannus  It is worse when the weather is warm  It interferes with her exercise regimen  The requires use of powders and creams  I recommended panniculectomy to alleviate her symptoms  I discussed with her the risks, benefits and alternatives of the procedure including not limited to risk of bleeding, infection, scar, seroma and wound dehiscence  She understands these risks and wishes to proceed  I personally obtained her informed consent

## 2018-06-15 LAB
LAB AP GYN PRIMARY INTERPRETATION: NORMAL
Lab: NORMAL

## 2018-07-12 ENCOUNTER — OFFICE VISIT (OUTPATIENT)
Dept: PLASTIC SURGERY | Facility: CLINIC | Age: 48
End: 2018-07-12
Payer: COMMERCIAL

## 2018-07-12 VITALS — HEIGHT: 61 IN | WEIGHT: 144 LBS | BODY MASS INDEX: 27.19 KG/M2

## 2018-07-12 DIAGNOSIS — N62 MACROMASTIA: Primary | ICD-10-CM

## 2018-07-12 PROCEDURE — 99212 OFFICE O/P EST SF 10 MIN: CPT | Performed by: SURGERY

## 2018-07-12 NOTE — PROGRESS NOTES
Macromastia  56-year-old female who presents for a 2nd consultation for breast reduction  I again went over risks, benefits and alternatives of the procedure including not limited to risk of bleeding, infection, characteristic inverted T scar, T point dehiscence, loss of nipple sensation and asymmetry  She understands these risks and wishes to proceed  She is approved for 338 grams reduction  I informed her that she will likely be a B or even an A cup given the size of her breasts and the required reduction volume  She is okay with this  We also discussed the possibility of doing bilateral medial thigh plasty at the same time  She does have some redundant skin and subcutaneous tissue the medial portions of her legs  We will work up the estimated cost for this in added as a 2nd procedure with her breast reduction surgery  I spent 10 minutes with the patient with greater than half that time involving counseling

## 2018-07-12 NOTE — ASSESSMENT & PLAN NOTE
59-year-old female who presents for a 2nd consultation for breast reduction  I again went over risks, benefits and alternatives of the procedure including not limited to risk of bleeding, infection, characteristic inverted T scar, T point dehiscence, loss of nipple sensation and asymmetry  She understands these risks and wishes to proceed  She is approved for 338 grams reduction  I informed her that she will likely be a B or even an A cup given the size of her breasts and the required reduction volume  She is okay with this  We also discussed the possibility of doing bilateral medial thigh plasty at the same time  She does have some redundant skin and subcutaneous tissue the medial portions of her legs  We will work up the estimated cost for this in added as a 2nd procedure with her breast reduction surgery  I spent 10 minutes with the patient with greater than half that time involving counseling

## 2018-07-28 ENCOUNTER — TRANSCRIBE ORDERS (OUTPATIENT)
Dept: LAB | Facility: HOSPITAL | Age: 48
End: 2018-07-28

## 2018-07-28 ENCOUNTER — APPOINTMENT (OUTPATIENT)
Dept: LAB | Facility: HOSPITAL | Age: 48
End: 2018-07-28
Attending: SURGERY
Payer: COMMERCIAL

## 2018-07-28 DIAGNOSIS — N62 MACROMASTIA: ICD-10-CM

## 2018-07-28 LAB
ANION GAP SERPL CALCULATED.3IONS-SCNC: 4 MMOL/L (ref 4–13)
BASOPHILS # BLD AUTO: 0.02 THOUSANDS/ΜL (ref 0–0.1)
BASOPHILS NFR BLD AUTO: 0 % (ref 0–1)
BUN SERPL-MCNC: 16 MG/DL (ref 5–25)
CALCIUM SERPL-MCNC: 9 MG/DL (ref 8.3–10.1)
CHLORIDE SERPL-SCNC: 104 MMOL/L (ref 100–108)
CO2 SERPL-SCNC: 30 MMOL/L (ref 21–32)
CREAT SERPL-MCNC: 0.68 MG/DL (ref 0.6–1.3)
EOSINOPHIL # BLD AUTO: 0.13 THOUSAND/ΜL (ref 0–0.61)
EOSINOPHIL NFR BLD AUTO: 2 % (ref 0–6)
ERYTHROCYTE [DISTWIDTH] IN BLOOD BY AUTOMATED COUNT: 12.6 % (ref 11.6–15.1)
GFR SERPL CREATININE-BSD FRML MDRD: 105 ML/MIN/1.73SQ M
GLUCOSE P FAST SERPL-MCNC: 89 MG/DL (ref 65–99)
HCT VFR BLD AUTO: 39.8 % (ref 34.8–46.1)
HGB BLD-MCNC: 13.4 G/DL (ref 11.5–15.4)
IMM GRANULOCYTES # BLD AUTO: 0.01 THOUSAND/UL (ref 0–0.2)
IMM GRANULOCYTES NFR BLD AUTO: 0 % (ref 0–2)
LYMPHOCYTES # BLD AUTO: 2.38 THOUSANDS/ΜL (ref 0.6–4.47)
LYMPHOCYTES NFR BLD AUTO: 40 % (ref 14–44)
MCH RBC QN AUTO: 32.4 PG (ref 26.8–34.3)
MCHC RBC AUTO-ENTMCNC: 33.7 G/DL (ref 31.4–37.4)
MCV RBC AUTO: 96 FL (ref 82–98)
MONOCYTES # BLD AUTO: 0.49 THOUSAND/ΜL (ref 0.17–1.22)
MONOCYTES NFR BLD AUTO: 8 % (ref 4–12)
NEUTROPHILS # BLD AUTO: 2.94 THOUSANDS/ΜL (ref 1.85–7.62)
NEUTS SEG NFR BLD AUTO: 50 % (ref 43–75)
NRBC BLD AUTO-RTO: 0 /100 WBCS
PLATELET # BLD AUTO: 213 THOUSANDS/UL (ref 149–390)
PMV BLD AUTO: 9.9 FL (ref 8.9–12.7)
POTASSIUM SERPL-SCNC: 4.5 MMOL/L (ref 3.5–5.3)
RBC # BLD AUTO: 4.14 MILLION/UL (ref 3.81–5.12)
SODIUM SERPL-SCNC: 138 MMOL/L (ref 136–145)
WBC # BLD AUTO: 5.97 THOUSAND/UL (ref 4.31–10.16)

## 2018-07-28 PROCEDURE — 85025 COMPLETE CBC W/AUTO DIFF WBC: CPT

## 2018-07-28 PROCEDURE — 80048 BASIC METABOLIC PNL TOTAL CA: CPT

## 2018-07-28 PROCEDURE — 36415 COLL VENOUS BLD VENIPUNCTURE: CPT

## 2018-08-02 RX ORDER — SACCHAROMYCES BOULARDII 250 MG
250 CAPSULE ORAL 2 TIMES DAILY
COMMUNITY
End: 2019-01-18

## 2018-08-02 NOTE — PRE-PROCEDURE INSTRUCTIONS
Pre-Surgery Instructions:   Medication Instructions    Biotin w/ Vitamins C & E (HAIR/SKIN/NAILS) 1250-7 5-7 5 MCG-MG-UNT CHEW Patient was instructed by Physician and understands   Calcium Citrate-Vitamin D (CALCIUM CITRATE+D3 PO) Patient was instructed by Physician and understands   [employer]S PAIN RELIEF ADULT 500 MG/15ML LIQD Instructed patient per Anesthesia Guidelines   DAILY MULTIPLE VITAMINS/IRON PO Instructed patient per Anesthesia Guidelines   Melatonin 10 MG TABS Instructed patient per Anesthesia Guidelines   saccharomyces boulardii (FLORASTOR) 250 mg capsule Patient was instructed by Physician and understands  Pre op and bathing instructions reviewed   Pt has hibiclens

## 2018-08-12 NOTE — H&P
H&P - Plastic Surgery   Flory Ramirez 53 y o  female MRN: [id number]  Unit/Bed#:  Encounter: [id number]           Assessment:  macromastia  Plan:  breast reduction        HPI:   Eduar Naik is a 53y o  year old female who presents with status post gastric bypass surgery with massive weight loss who presents with large pendulous breasts that she believes are causing her neck pain, back pain, bra strap grooving and rashes beneath her breasts  She presents in consultation for breast reduction      She has symptomatic macromastia  Dr Sylvie Colunga has recommended bilateral breast reduction to alleviate her symptoms  He discussed with her the risks, benefits and alternatives of the procedure including but not limited to risks of bleeding, infection, characteristic inverted T pattern scar, seroma and possibility of loss of nipple sensation  She understands these risks and wishes to proceed  REVIEW OF SYSTEMS    GENERAL/CONSTITUTIONAL: The patient denies fever, fatigue, weakness, weight gain or weight loss  HEAD, EYES, EARS, NOSE AND THROAT: Eyes - The patient denies pain, redness, loss of vision, double or blurred vision and denies wearing glasses  The patient denies ringing in the ears, nosebleeds sinusitis, post nasal drip  Also denies frequent sore throats, hoarseness, painful swallowing  CARDIOVASCULAR: The patient denies chest pain, irregular heartbeats, palpitations, shortness of breath, heart murmurs, high blood pressure, cramps in his legs with walking, pain in his feet or toes at night or varicose veins  RESPIRATORY: The patient denies chronic cough, wheezing or night sweats  GASTROINTESTINAL: The patient denies decreased appetite, nausea, vomiting, diarrhea, constipation, blood in the stools  GENITOURINARY: The patient denies difficult urination, pain or burning with urination, blood in the urine  MUSCULOSKELETAL: The patient denies arm, thigh or calf cramps  No joint or muscle pain   No muscle weakness or tenderness  No joint swelling, neck pain, back pain or major orthopedic injuries  SKIN AND BREASTS:  AS per HPI The patient denies easy bruising, skin redness, skin rash, hives  NEUROLOGIC: The patient denies headache, dizziness, fainting, memory loss  PSYCHIATRIC: The patient denies depression anxiety  ENDOCRINE: The patient denies intolerance to hot or cold temperature, flushing, fingernail changes, increased thirst, increased salt intake or decreased sexual desire  HEMATOLOGIC/LYMPHATIC: The patient denies anemia, bleeding tendency or clotting tendency  ALLERGIC/IMMUNOLOGIC: The patient denies rhinitis, asthma, skin sensitivity, latex allergies or sensitivity  Historical Information   Past Medical History:   Diagnosis Date    Achilles tendinitis     right     Back pain     Carpal tunnel syndrome     Last Assessed: 2016    Generalized obesity     Last Assessed: 10/5/2016    Multiple thyroid nodules     Palpitations     Last Assessed: 2013    Plantar fasciitis     bilateral    Positive PPD     Last Assessed: 10/21/2014    Tendonitis, Achilles     Last Assessed: 3/3/2017    Varicose vein of leg     Viral warts     Last Assessed: 2013     Past Surgical History:   Procedure Laterality Date    CARPAL TUNNEL RELEASE       SECTION      x3    ESOPHAGOGASTRODUODENOSCOPY N/A 2017    Procedure: ESOPHAGOGASTRODUODENOSCOPY (EGD); Surgeon: Emre Lyons MD;  [facility];  Service: [place] [address]       NOSE SURGERY      PARAESOPHAGEAL HERNIA REPAIR N/A 2017    Procedure: REPAIR HERNIA PARAESOPHAGEAL  LAPAROSCOPIC;  Surgeon: Emre Lyons MD;  [facility];  Service: Bariatrics    WV EGD TRANSORAL BIOPSY SINGLE/MULTIPLE N/A 2017    Procedure: ESOPHAGOGASTRODUODENOSCOPY (EGD) with biopsy;  Surgeon: Emre Lyons MD;  [facility];   Service: Bariatrics    W[place] LAP GASTRIC BYPASS/MARCIN-EN-Y N/A 2017 Procedure: BYPASS GASTRIC  MARCIN-EN-Y LAPAROSCOPIC;  Surgeon: Brit Davidson MD;  [facility];  Service: Bariatrics    NH WRIST Wydavidnne Jamshid LIG Right 1/27/2017    Procedure: ENDOSCOPIC CARPAL TUNNEL RELEASE ;  Surgeon: Yannick West MD;  [facility];  Service: Orthopedics    NH WRIST Wyjennifer Breen LIG Left 6/16/2017    Procedure: ENDOSCOPIC CARPAL TUNNEL RELEASE;  Surgeon: Yannick West MD;  [facility];  Service: Orthopedics    TONSILLECTOMY      TUBAL LIGATION      WISDOM TOOTH EXTRACTION       Social History   History   Alcohol Use No     History   Drug Use No     History   Smoking Status    Former Smoker    Quit date: 1/27/2014   Smokeless Tobacco    Never Used     Family History:   Family History   Problem Relation Age of Onset    Diabetes Mother     Heart disease Mother     Stroke Mother         Syndrome    Heart disease Father     Hypertension Father     Kidney disease Father     Stroke Maternal Grandmother         Syndrome    Alzheimer's disease Maternal Grandfather     Arthritis Maternal Aunt     Diabetes Maternal Aunt     Hypertension Maternal Aunt        Meds/Allergies   current meds:   No current facility-administered medications for this encounter  Objective     Physical Exam   Constitutional: She is oriented to person, place, and time  She appears well-developed and well-nourished  HENT:   Head: Normocephalic and atraumatic  Eyes: Conjunctivae are normal  Pupils are equal, round, and reactive to light  Neck: Normal range of motion  Cardiovascular: Normal rate and regular rhythm  Pulmonary/Chest: Effort normal and breath sounds normal        Abdominal: Soft  Bowel sounds are normal    The pannus hangs below the level of the pubis and covers genitals  Stigmata of irritation in the folds with hyperpigmentation   Musculoskeletal: Normal range of motion     Neurological: She is alert and oriented to person, place, and time  Skin: Skin is warm and dry  Psychiatric: She has a normal mood and affect  Her behavior is normal      Lab Results:   Lab Results   Component Value Date    WBC 5 97 07/28/2018    HGB 13 4 07/28/2018    HCT 39 8 07/28/2018    MCV 96 07/28/2018     07/28/2018          No results found for: TISSUECULT, WOUNDCULT      Imaging Studies:   No results found  EKG, Pathology, and Other Studies:   Lab Results   Component Value Date/Time    [facility]  11/06/2017 08:42 AM     A   Hernia sac, abdominal (excision):  - Benign mature fibroadipose tissue   - One (1) reactive lymph node    Interpretation performed at [address] [address]            No intake or output data in the 24 hours ending 08/12/18 1746    Invasive Devices          No matching active lines, drains, or airways          VTE Prophylaxis: Sequential compression device Dennis Viktor)     Code Status: Prior  Advance Directive and Living Will:      Power of :       * H&P per Dr Ximena Oliveira

## 2018-08-13 ENCOUNTER — ANESTHESIA EVENT (OUTPATIENT)
Dept: PERIOP | Facility: HOSPITAL | Age: 48
End: 2018-08-13
Payer: COMMERCIAL

## 2018-08-14 ENCOUNTER — ANESTHESIA (OUTPATIENT)
Dept: PERIOP | Facility: HOSPITAL | Age: 48
End: 2018-08-14
Payer: COMMERCIAL

## 2018-08-14 ENCOUNTER — HOSPITAL ENCOUNTER (OUTPATIENT)
Facility: HOSPITAL | Age: 48
Setting detail: OUTPATIENT SURGERY
Discharge: HOME | End: 2018-08-14
Attending: SURGERY | Admitting: SURGERY
Payer: COMMERCIAL

## 2018-08-14 VITALS
TEMPERATURE: 98.5 F | RESPIRATION RATE: 17 BRPM | OXYGEN SATURATION: 96 % | SYSTOLIC BLOOD PRESSURE: 106 MMHG | BODY MASS INDEX: 26.81 KG/M2 | WEIGHT: 142 LBS | DIASTOLIC BLOOD PRESSURE: 61 MMHG | HEIGHT: 61 IN | HEART RATE: 86 BPM

## 2018-08-14 DIAGNOSIS — N62 MACROMASTIA: Primary | ICD-10-CM

## 2018-08-14 PROCEDURE — 19318 BREAST REDUCTION: CPT | Performed by: SURGERY

## 2018-08-14 PROCEDURE — 88305 TISSUE EXAM BY PATHOLOGIST: CPT | Performed by: PATHOLOGY

## 2018-08-14 PROCEDURE — 19318 BREAST REDUCTION: CPT | Performed by: PHYSICIAN ASSISTANT

## 2018-08-14 RX ORDER — SODIUM CHLORIDE 9 MG/ML
INJECTION, SOLUTION INTRAVENOUS CONTINUOUS PRN
Status: DISCONTINUED | OUTPATIENT
Start: 2018-08-14 | End: 2018-08-14 | Stop reason: SURG

## 2018-08-14 RX ORDER — MAGNESIUM HYDROXIDE 1200 MG/15ML
LIQUID ORAL AS NEEDED
Status: DISCONTINUED | OUTPATIENT
Start: 2018-08-14 | End: 2018-08-14 | Stop reason: HOSPADM

## 2018-08-14 RX ORDER — TRAMADOL HYDROCHLORIDE 50 MG/1
50 TABLET ORAL ONCE
Status: COMPLETED | OUTPATIENT
Start: 2018-08-14 | End: 2018-08-14

## 2018-08-14 RX ORDER — TRAMADOL HYDROCHLORIDE 50 MG/1
50 TABLET ORAL EVERY 8 HOURS PRN
Qty: 31 TABLET | Refills: 0 | Status: SHIPPED | OUTPATIENT
Start: 2018-08-14 | End: 2018-08-24

## 2018-08-14 RX ORDER — LIDOCAINE HYDROCHLORIDE 10 MG/ML
INJECTION, SOLUTION INFILTRATION; PERINEURAL AS NEEDED
Status: DISCONTINUED | OUTPATIENT
Start: 2018-08-14 | End: 2018-08-14 | Stop reason: SURG

## 2018-08-14 RX ORDER — SODIUM CHLORIDE, SODIUM LACTATE, POTASSIUM CHLORIDE, CALCIUM CHLORIDE 600; 310; 30; 20 MG/100ML; MG/100ML; MG/100ML; MG/100ML
50 INJECTION, SOLUTION INTRAVENOUS CONTINUOUS
Status: DISCONTINUED | OUTPATIENT
Start: 2018-08-14 | End: 2018-08-14 | Stop reason: HOSPADM

## 2018-08-14 RX ORDER — ROCURONIUM BROMIDE 10 MG/ML
INJECTION, SOLUTION INTRAVENOUS AS NEEDED
Status: DISCONTINUED | OUTPATIENT
Start: 2018-08-14 | End: 2018-08-14 | Stop reason: SURG

## 2018-08-14 RX ORDER — GLYCOPYRROLATE 0.2 MG/ML
INJECTION INTRAMUSCULAR; INTRAVENOUS AS NEEDED
Status: DISCONTINUED | OUTPATIENT
Start: 2018-08-14 | End: 2018-08-14 | Stop reason: SURG

## 2018-08-14 RX ORDER — PROPOFOL 10 MG/ML
INJECTION, EMULSION INTRAVENOUS AS NEEDED
Status: DISCONTINUED | OUTPATIENT
Start: 2018-08-14 | End: 2018-08-14 | Stop reason: SURG

## 2018-08-14 RX ORDER — HYDROMORPHONE HYDROCHLORIDE 2 MG/ML
INJECTION, SOLUTION INTRAMUSCULAR; INTRAVENOUS; SUBCUTANEOUS AS NEEDED
Status: DISCONTINUED | OUTPATIENT
Start: 2018-08-14 | End: 2018-08-14 | Stop reason: SURG

## 2018-08-14 RX ORDER — FENTANYL CITRATE 50 UG/ML
INJECTION, SOLUTION INTRAMUSCULAR; INTRAVENOUS AS NEEDED
Status: DISCONTINUED | OUTPATIENT
Start: 2018-08-14 | End: 2018-08-14 | Stop reason: SURG

## 2018-08-14 RX ORDER — BUPIVACAINE HYDROCHLORIDE 2.5 MG/ML
INJECTION, SOLUTION EPIDURAL; INFILTRATION; INTRACAUDAL AS NEEDED
Status: DISCONTINUED | OUTPATIENT
Start: 2018-08-14 | End: 2018-08-14 | Stop reason: HOSPADM

## 2018-08-14 RX ORDER — GABAPENTIN 100 MG/1
100 CAPSULE ORAL 3 TIMES DAILY
Qty: 30 CAPSULE | Refills: 0 | Status: SHIPPED | OUTPATIENT
Start: 2018-08-14 | End: 2018-11-13 | Stop reason: ALTCHOICE

## 2018-08-14 RX ORDER — FENTANYL CITRATE/PF 50 MCG/ML
50 SYRINGE (ML) INJECTION
Status: DISCONTINUED | OUTPATIENT
Start: 2018-08-14 | End: 2018-08-14 | Stop reason: HOSPADM

## 2018-08-14 RX ORDER — ONDANSETRON 2 MG/ML
INJECTION INTRAMUSCULAR; INTRAVENOUS AS NEEDED
Status: DISCONTINUED | OUTPATIENT
Start: 2018-08-14 | End: 2018-08-14 | Stop reason: SURG

## 2018-08-14 RX ORDER — METOCLOPRAMIDE HYDROCHLORIDE 5 MG/ML
10 INJECTION INTRAMUSCULAR; INTRAVENOUS ONCE AS NEEDED
Status: DISCONTINUED | OUTPATIENT
Start: 2018-08-14 | End: 2018-08-14 | Stop reason: HOSPADM

## 2018-08-14 RX ORDER — EPHEDRINE SULFATE 50 MG/ML
INJECTION, SOLUTION INTRAVENOUS AS NEEDED
Status: DISCONTINUED | OUTPATIENT
Start: 2018-08-14 | End: 2018-08-14 | Stop reason: SURG

## 2018-08-14 RX ORDER — ACETAMINOPHEN 325 MG/1
975 TABLET ORAL ONCE
Status: COMPLETED | OUTPATIENT
Start: 2018-08-14 | End: 2018-08-14

## 2018-08-14 RX ORDER — MIDAZOLAM HYDROCHLORIDE 1 MG/ML
INJECTION INTRAMUSCULAR; INTRAVENOUS AS NEEDED
Status: DISCONTINUED | OUTPATIENT
Start: 2018-08-14 | End: 2018-08-14 | Stop reason: SURG

## 2018-08-14 RX ORDER — GABAPENTIN 300 MG/1
300 CAPSULE ORAL ONCE
Status: COMPLETED | OUTPATIENT
Start: 2018-08-14 | End: 2018-08-14

## 2018-08-14 RX ORDER — ONDANSETRON 2 MG/ML
4 INJECTION INTRAMUSCULAR; INTRAVENOUS ONCE AS NEEDED
Status: DISCONTINUED | OUTPATIENT
Start: 2018-08-14 | End: 2018-08-14 | Stop reason: HOSPADM

## 2018-08-14 RX ORDER — CEPHALEXIN 500 MG/1
500 CAPSULE ORAL EVERY 6 HOURS SCHEDULED
Qty: 28 CAPSULE | Refills: 0 | Status: SHIPPED | OUTPATIENT
Start: 2018-08-14 | End: 2018-08-21

## 2018-08-14 RX ADMIN — SODIUM CHLORIDE: 0.9 INJECTION, SOLUTION INTRAVENOUS at 10:43

## 2018-08-14 RX ADMIN — ROCURONIUM BROMIDE 20 MG: 10 INJECTION INTRAVENOUS at 09:48

## 2018-08-14 RX ADMIN — LIDOCAINE HYDROCHLORIDE 50 MG: 10 INJECTION, SOLUTION INFILTRATION; PERINEURAL at 07:48

## 2018-08-14 RX ADMIN — HYDROMORPHONE HYDROCHLORIDE 0.5 MG: 2 INJECTION, SOLUTION INTRAMUSCULAR; INTRAVENOUS; SUBCUTANEOUS at 08:49

## 2018-08-14 RX ADMIN — ACETAMINOPHEN 975 MG: 325 TABLET, FILM COATED ORAL at 07:01

## 2018-08-14 RX ADMIN — ONDANSETRON 4 MG: 2 INJECTION INTRAMUSCULAR; INTRAVENOUS at 10:42

## 2018-08-14 RX ADMIN — MIDAZOLAM HYDROCHLORIDE 2 MG: 1 INJECTION, SOLUTION INTRAMUSCULAR; INTRAVENOUS at 07:45

## 2018-08-14 RX ADMIN — CEFAZOLIN SODIUM 1000 MG: 1 SOLUTION INTRAVENOUS at 07:45

## 2018-08-14 RX ADMIN — TRAMADOL HYDROCHLORIDE 50 MG: 50 TABLET, FILM COATED ORAL at 12:41

## 2018-08-14 RX ADMIN — FENTANYL CITRATE 100 MCG: 50 INJECTION INTRAMUSCULAR; INTRAVENOUS at 08:16

## 2018-08-14 RX ADMIN — GABAPENTIN 300 MG: 300 CAPSULE ORAL at 07:01

## 2018-08-14 RX ADMIN — DEXAMETHASONE SODIUM PHOSPHATE 10 MG: 10 INJECTION INTRAMUSCULAR; INTRAVENOUS at 08:00

## 2018-08-14 RX ADMIN — PROPOFOL 200 MG: 10 INJECTION, EMULSION INTRAVENOUS at 07:48

## 2018-08-14 RX ADMIN — ROCURONIUM BROMIDE 50 MG: 10 INJECTION INTRAVENOUS at 07:48

## 2018-08-14 RX ADMIN — EPHEDRINE SULFATE 10 MG: 50 INJECTION, SOLUTION INTRAMUSCULAR; INTRAVENOUS; SUBCUTANEOUS at 09:08

## 2018-08-14 RX ADMIN — GLYCOPYRROLATE 0.4 MG: 0.2 INJECTION, SOLUTION INTRAMUSCULAR; INTRAVENOUS at 11:10

## 2018-08-14 RX ADMIN — HYDROMORPHONE HYDROCHLORIDE 0.5 MG: 2 INJECTION, SOLUTION INTRAMUSCULAR; INTRAVENOUS; SUBCUTANEOUS at 09:48

## 2018-08-14 RX ADMIN — NEOSTIGMINE METHYLSULFATE 2 MG: 1 INJECTION, SOLUTION INTRAMUSCULAR; INTRAVENOUS; SUBCUTANEOUS at 11:10

## 2018-08-14 RX ADMIN — EPHEDRINE SULFATE 5 MG: 50 INJECTION, SOLUTION INTRAMUSCULAR; INTRAVENOUS; SUBCUTANEOUS at 10:19

## 2018-08-14 RX ADMIN — SODIUM CHLORIDE: 0.9 INJECTION, SOLUTION INTRAVENOUS at 06:57

## 2018-08-14 NOTE — ANESTHESIA PREPROCEDURE EVALUATION
Review of Systems/Medical History  Patient summary reviewed  Chart reviewed  No history of anesthetic complications     Cardiovascular  Negative cardio ROS    Pulmonary  Negative pulmonary ROS        GI/Hepatic    GERD , Bariatric surgery,             Endo/Other     GYN       Hematology   Musculoskeletal       Neurology   Psychology           Physical Exam    Airway    Mallampati score: II  TM Distance: >3 FB  Neck ROM: full     Dental       Cardiovascular  Comment: Negative ROS,     Pulmonary      Other Findings        Anesthesia Plan  ASA Score- 2     Anesthesia Type- general with ASA Monitors  Additional Monitors:   Airway Plan: ETT  Plan Factors-    Induction- intravenous  Postoperative Plan-     Informed Consent- Anesthetic plan and risks discussed with patient  I personally reviewed this patient with the CRNA  Discussed and agreed on the Anesthesia Plan with the CRNA  Angely Marshall

## 2018-08-14 NOTE — OP NOTE
OPERATIVE REPORT  PATIENT NAME: Elissa Stewart    :  1970  MRN: [id number]  Pt [facility]    SURGERY DATE: 2018    Surgeon(s) and Role:     * Crow Garcia PA-C - Prentiss Schilder, MD - Primary    Preop Diagnosis:  Macromastia [N62]    Post-Op Diagnosis Codes:     * Macromastia [N62]    Procedure(s) (LRB):  REDUCTION MAMMOPLASTY BREAST (Bilateral)    Specimen(s):  ID Type Source Tests Collected by Time Destination   1 :  Tissue Breast, Left TISSUE EXAM Min Ohara MD 2018 1019    2 :  Tissue Breast, Right TISSUE EXAM Min Ohara MD 2018 0908        Estimated Blood Loss:   Minimal    Drains:  [REMOVED] Urethral Catheter Non-latex 16 Fr  (Removed)   Number of days: 0       Anesthesia Type:   General    Operative Indications:  Macromastia [N62]      Operative Findings:  Right breast 346 g, left breast 302 g    Complications:   None    Procedure and Technique:  67-year-old female with bilateral symptomatic macromastia  She presents for bilateral reduction mammoplasty  Risks, benefits and alternatives of procedure were explained to the patient understood and wished to proceed  Pertinent risks that were discussed included bleeding, infection, scar in the characteristic pattern of an upside down T, loss of nipple sensation, loss of nipple and seroma  The patient was identified in the sites for marked in preop holding  Patient was taken to the operating room where she was placed in supine position  After successful induction of general anesthesia with endotracheal intubation the patient was prepped with ChloraPrep and draped in standard sterile fashion  A timeout was performed  The patient, site and procedure were verified  60 mL of tumescent solution containing clindamycin epinephrine was injected along the patient's previously drawn markings  Using a 38 mm cookie cutter the right nipple incision was outlined   With a #15 blade scalpel the nipple incision was incised  An 8 cm wide pedicle based inferiorly was incised with a #10 blade scalpel  The inferior pedicle was de-epithelialized  Hemostasis was achieved with electrocautery  The inferior pedicle was then dissected first medially with electrocautery down to chest wall  The inferior pedicle was then dissected laterally down to chest wall electrocautery  The previously placed markings were then incised with a #10 blade scalpel  Electrocautery dissection was then carried out removing first the medial segment in continuity with the central segment and finally the lateral segment  Hemostasis was achieved with electrocautery with MAP greater than 70 mmHg  Medial and lateral limbs of the breast flaps were then sutured together and down to the breast median with an 2-0 Vicryl suture  The breast was then tailor tacked  Using a 38 mm cookie cutter the skin was then cut out a distance of 5 cm from the inframammary fold incision  The nipple was tailor tacked in place  The transverse incision was then closed with deep dermal InSorb absorbable staples  The vertical incision was then closed with buried interrupted 4-0 Monocryl sutures  The nipple was inset with buried interrupted 4-0 Monocryl suture  The inframammary fold incision was then closed with a running 3-0 stratafix suture  A total of 346 g was removed from the right breast      Using a 38 mm cookie cutter the left nipple incision was outlined  With a #15 blade scalpel the nipple incision was incised  An 8 cm wide pedicle based inferiorly was incised with a #10 blade scalpel  The inferior pedicle was de-epithelialized  Hemostasis was achieved with electrocautery  The inferior pedicle was then dissected first medially with electrocautery down to chest wall  The inferior pedicle was then dissected laterally down to chest wall electrocautery  The previously placed markings were then incised with a #10 blade scalpel   Electrocautery dissection was then carried out removing first the medial segment in continuity with the central segment and finally the lateral segment  Hemostasis was achieved with electrocautery with MAP greater than 70 mmHg  Medial and lateral limbs of the breast flaps were then sutured together and down to the breast median with an 2-0 Vicryl suture  The breast was then tailor tacked  Using a 38 mm cookie cutter the skin was then cut out a distance of 5 cm from the inframammary fold incision  The nipple was tailor tacked in place  The transverse incision was then closed with deep dermal InSorb absorbable staples  The vertical incision was then closed with buried interrupted 4-0 Monocryl sutures  The nipple was inset with buried interrupted 4-0 Monocryl suture  The inframammary fold incision was then closed with a running 3-0 stratafix suture  A total of 396 g was removed from the left breast        The transverse incision was then closed with a Prineo dressing  The vertical and periareolar incision was dressed with histoacryl skin glue  Patient tolerated the procedure well, emerged from general anesthesia, was extubated and taken to the PACU in stable condition  A qualified resident was not available      Patient Disposition:  PACU     SIGNATURE: Radha Florentino MD  DATE: August 14, 2018  TIME: 12:18 PM

## 2018-08-14 NOTE — DISCHARGE INSTRUCTIONS
Breast reduction discharge instructions  Keep Surgical Bra and dressings clean dry and intact until your follow-up appointment on Friday  For pain control, please take Tylenol 650 mg every 8 hours on a schedule  After 48 hours you may also take Motrin, ibuprofen, Aleve, naproxen or Advil  I have also written a prescription for Gabapentin and Tramadol as needed for pain  I recommend you not wait until your pain is 10 out of 10  Stay ahead of it  Take all your prescribed antibiotics (sent directly to your pharmacy)  You may take a probiotic or yogurt to restore your GI ayla  You may resume your previous medications  Please call my office if you experience increasing pain, increasing redness, increased swelling, drainage through the dressing or fever greater than 100 5°F     My office number is [phone]  Follow up in our office on Friday 8/17/18  CALL for appointment

## 2018-08-15 ENCOUNTER — TELEPHONE (OUTPATIENT)
Dept: PLASTIC SURGERY | Facility: CLINIC | Age: 48
End: 2018-08-15

## 2018-08-15 NOTE — TELEPHONE ENCOUNTER
Patient states she is doing well after procedure and has no questions  Follow up scheduled for Friday 8/17/18  Encouraged to call sooner with questions

## 2018-08-17 ENCOUNTER — OFFICE VISIT (OUTPATIENT)
Dept: PLASTIC SURGERY | Facility: CLINIC | Age: 48
End: 2018-08-17

## 2018-08-17 VITALS — WEIGHT: 142 LBS | BODY MASS INDEX: 26.81 KG/M2 | HEIGHT: 61 IN

## 2018-08-17 DIAGNOSIS — Z98.890 S/P BILATERAL BREAST REDUCTION: Primary | ICD-10-CM

## 2018-08-17 PROCEDURE — 99024 POSTOP FOLLOW-UP VISIT: CPT | Performed by: PHYSICIAN ASSISTANT

## 2018-08-17 NOTE — PROGRESS NOTES
Patient is postop day 3, bilateral reduction mammaplasty  Patient reports that she feels well, has minimal pain, which is controlled with gabapentin and tramadol  Her incisions are clean dry and intact  Breasts are soft; she has sensation in her nipples  She is given instruction on showering  She was told she could change her bra to a sports bra  She pointed out what she called a keloid on her left nostril  We will follow up with this at her return visit  She will see us again in about a week

## 2018-08-31 ENCOUNTER — OFFICE VISIT (OUTPATIENT)
Dept: PLASTIC SURGERY | Facility: CLINIC | Age: 48
End: 2018-08-31

## 2018-08-31 VITALS — WEIGHT: 142 LBS | BODY MASS INDEX: 26.81 KG/M2 | HEIGHT: 61 IN

## 2018-08-31 DIAGNOSIS — Z98.890 S/P BILATERAL BREAST REDUCTION: Primary | ICD-10-CM

## 2018-08-31 PROCEDURE — 99024 POSTOP FOLLOW-UP VISIT: CPT | Performed by: SURGERY

## 2018-08-31 RX ORDER — TRAMADOL HYDROCHLORIDE 50 MG/1
50 TABLET ORAL EVERY 6 HOURS PRN
Qty: 27 TABLET | Refills: 0 | Status: SHIPPED | OUTPATIENT
Start: 2018-08-31 | End: 2018-11-13 | Stop reason: ALTCHOICE

## 2018-08-31 NOTE — ASSESSMENT & PLAN NOTE
67-year-old female status post bilateral breast reduction  She presents today for follow-up evaluation 2 weeks post breast reduction  Her prineo dressings were removed without difficulty  She had some fullness of her right axilla  There appears to be a significant seroma there  I prepped the right axilla with chlorhexidine followed by Betadine  Using a 60 cc syringe and 18 gauge needle, the right axillary collection was aspirated for 300 cc of serous fluid  She tolerated this well and without complication  She will return in 1 week to re-evaluate  She still is complaining of some discomfort  I am prescribing her additional week of Tramadol for this

## 2018-08-31 NOTE — PROGRESS NOTES
S/P bilateral breast reduction  43-year-old female status post bilateral breast reduction  She presents today for follow-up evaluation 2 weeks post breast reduction  Her prineo dressings were removed without difficulty  She had some fullness of her right axilla  There appears to be a significant seroma there  I prepped the right axilla with chlorhexidine followed by Betadine  Using a 60 cc syringe and 18 gauge needle, the right axillary collection was aspirated for 300 cc of serous fluid  She tolerated this well and without complication  She will return in 1 week to re-evaluate  She still is complaining of some discomfort  I am prescribing her additional week of Tramadol for this

## 2018-09-05 PROBLEM — N62 MACROMASTIA: Status: RESOLVED | Noted: 2018-06-07 | Resolved: 2018-09-05

## 2018-09-06 DIAGNOSIS — K21.9 GASTROESOPHAGEAL REFLUX DISEASE WITHOUT ESOPHAGITIS: Primary | ICD-10-CM

## 2018-09-07 ENCOUNTER — HOSPITAL ENCOUNTER (OUTPATIENT)
Dept: RADIOLOGY | Facility: HOSPITAL | Age: 48
Discharge: HOME | End: 2018-09-07
Admitting: RADIOLOGY
Payer: COMMERCIAL

## 2018-09-07 ENCOUNTER — OFFICE VISIT (OUTPATIENT)
Dept: PLASTIC SURGERY | Facility: CLINIC | Age: 48
End: 2018-09-07

## 2018-09-07 VITALS — WEIGHT: 138 LBS | HEIGHT: 61 IN | BODY MASS INDEX: 26.06 KG/M2

## 2018-09-07 DIAGNOSIS — Z98.890 S/P BILATERAL BREAST REDUCTION: Primary | ICD-10-CM

## 2018-09-07 DIAGNOSIS — Z98.890 S/P BILATERAL BREAST REDUCTION: ICD-10-CM

## 2018-09-07 PROCEDURE — C1729 CATH, DRAINAGE: HCPCS

## 2018-09-07 PROCEDURE — 10030 IMG GID FLU COLL DRG SFT TIS: CPT | Performed by: RADIOLOGY

## 2018-09-07 PROCEDURE — 10030 IMG GID FLU COLL DRG SFT TIS: CPT

## 2018-09-07 PROCEDURE — 99024 POSTOP FOLLOW-UP VISIT: CPT | Performed by: PHYSICIAN ASSISTANT

## 2018-09-07 PROCEDURE — C1769 GUIDE WIRE: HCPCS

## 2018-09-07 NOTE — PROGRESS NOTES
Patient is postop bilateral reduction mammaplasty from 8/14/18        Her incisions are clean dry and intact  Breasts are soft; she has sensation in her nipples  She has a significant fullness in her right axilla  Will send to IR for evacuation and a drain  She will see us in 1 week

## 2018-09-07 NOTE — DISCHARGE INSTRUCTIONS
TUBE CARE INSTRUCTIONS    Care after your procedure:    Resume your normal diet  Small sips of flat soda will help with nausea  1  The properly functioning catheter should be forward flushed once (1x) daily with 10ml of normal saline using clean technique  You will be given a prescription for flushes  To flush the tube, clean both connections with alcohol swab  Twist off the drainage bag/ bulb  tubing and twist the saline syringe into the drainage tube and flush  Remove the syringe and twist the drainage bag / bulb tubing tubing back on     2  The drainage bag/bulb may be emptied as necessary  Keep a record of the amount of fluid you drain from your tube  This should be done with clean technique as well  3  A fresh dressing should be applied daily over the tube insertion site  4  As the tube is secured to the skin with only a suture,try not to pull on your tube  Tub baths are not permitted  Showers are permitted if the patient's skin entry site is prevented from getting wet  Similarly, washcloth "baths" are acceptable  Contact Interventional Radiology at [phone] Jean PATIENTS: Contact Interventional Radiology at [phone]) Peggy Moraes PATIENTS: Contact Interventional Radiology at [phone]) if:    1  Leakage or large amounts of liquid around the catheter  2  Fever of 101 degrees lasting several hours without other obvious cause (such as sore throat, flu, etc)  3  Persistent nausea or vomiting  4  Diminished drainage, which may be associated with pressure or pain  Or when the     drainage from your tube is less than 10mls for 48 hours  5  Catheter pulled back or falls out  The following pharmacies carry the flush syringes         [facility]                     [facility]  [address] [facility]                         [address] [facility]  Phone [phone]            Phone [phone]   [place] [address]                                [phone]  [address]                      [address] [place] [address]  Phone [phone]            Phone [phone]                      [facility]                                                                                                          [phone]  [facility]  [facility] [address]    [address]  Phone [phone]

## 2018-09-07 NOTE — BRIEF OP NOTE (RAD/CATH)
IR IMAGE GUIDED ASPIRATION / DRAINAGE W TUBE  Procedure Note    PATIENT NAME: Ramsey Kimbrough  : 1970  MRN: [id number]     Pre-op Diagnosis:   1  S/P bilateral breast reduction      Post-op Diagnosis:   1  S/P bilateral breast reduction        Surgeon:   Alejandro Alvarado MD    Estimated Blood Loss:  Minimal  Findings:  8 Khmer drain placed into right breast seroma using ultrasound guidance  190 cc of bloody fluid was aspirated  Follow-up has been scheduled i to assess output in need for additional intervention/sclerosis      Specimens:  None    Complications:  None    Anesthesia: Local    Alejandro Alvarado MD     Date: 2018  Time: 2:47 PM

## 2018-09-10 RX ORDER — OMEPRAZOLE 20 MG/1
CAPSULE, DELAYED RELEASE ORAL
Qty: 90 CAPSULE | Refills: 2 | Status: SHIPPED | OUTPATIENT
Start: 2018-09-10 | End: 2018-11-13 | Stop reason: ALTCHOICE

## 2018-09-14 ENCOUNTER — OFFICE VISIT (OUTPATIENT)
Dept: PLASTIC SURGERY | Facility: CLINIC | Age: 48
End: 2018-09-14

## 2018-09-14 VITALS — BODY MASS INDEX: 26.06 KG/M2 | WEIGHT: 138 LBS | HEIGHT: 61 IN

## 2018-09-14 DIAGNOSIS — Z98.890 S/P BILATERAL BREAST REDUCTION: Primary | ICD-10-CM

## 2018-09-14 PROCEDURE — 99024 POSTOP FOLLOW-UP VISIT: CPT | Performed by: PHYSICIAN ASSISTANT

## 2018-09-14 NOTE — PROGRESS NOTES
Patient is postop bilateral reduction mammaplasty from 8/14/18    IR drain in place  This will be removed next Tuesday  Incisions clean dry intact      She will follow up in 2 months with Dr Liam Mabry

## 2018-09-18 ENCOUNTER — HOSPITAL ENCOUNTER (OUTPATIENT)
Dept: RADIOLOGY | Facility: HOSPITAL | Age: 48
Discharge: HOME | End: 2018-09-18
Admitting: RADIOLOGY
Payer: COMMERCIAL

## 2018-09-18 ENCOUNTER — TELEPHONE (OUTPATIENT)
Dept: RADIOLOGY | Facility: HOSPITAL | Age: 48
End: 2018-09-18

## 2018-09-18 DIAGNOSIS — N64.89 SEROMA OF BREAST: ICD-10-CM

## 2018-09-18 PROCEDURE — 76080 X-RAY EXAM OF FISTULA: CPT

## 2018-09-18 PROCEDURE — 49424 ASSESS CYST CONTRAST INJECT: CPT

## 2018-09-18 RX ADMIN — IOHEXOL 3 ML: 300 INJECTION, SOLUTION INTRAVENOUS at 13:48

## 2018-09-18 NOTE — SEDATION DOCUMENTATION
Patient here for R breast seroma tube check  Reports decreasing outputs  5 ml/day  Drainage appears serosanguinous  Bulb appears to have 15-20 mL of drainage  Patient reports emptied bulb at 10 pm  Amount in bulb conflicts with drainage reported by patient  Dr Jayme Abreu made aware

## 2018-09-20 ENCOUNTER — DOCUMENTATION (OUTPATIENT)
Dept: BARIATRICS | Facility: CLINIC | Age: 48
End: 2018-09-20

## 2018-09-20 NOTE — PROGRESS NOTES
Weight Management Nutrition Class     Diagnosis: Morbid Obesity    Bariatric Surgeon: Dr Car Alberts    Surgery: Gastric Bypass Laparoscopic    Class: 9 month post op note    Topics discussed today include:     fluid goals post op, protein goals post op, constipation, chew food well, exercise, avoidance of alcohol, PPI use, diet progression, hypoglycemia, dumping syndrome, protein supplems, vitamin/mineral supplements and calcium supplements    Patient was able to verbalize basic diet (protein, fluid, vitamin and mineral) recommendations and possible nutrition-related complications  Yes     Attended 9 month follow up meeting  Addressed both behavioral and dietary issues  Group discussion included the impact of tobacco use, alcohol use, psychiatric medications, relationships, body image and self esteem issues as it pertains to the weight loss surgery patient  During group discussion, reviewed vitamin recommendations, protein recommendations, portion sizes, balancing diet to include healthy carbohydrates, importance of exercise, and the bariatric rules for success   Overall pleased with weight loss and improved quality of life

## 2018-09-25 ENCOUNTER — TELEPHONE (OUTPATIENT)
Dept: PLASTIC SURGERY | Facility: CLINIC | Age: 48
End: 2018-09-25

## 2018-09-25 NOTE — TELEPHONE ENCOUNTER
Patient questions when she is able to return to the gym  Patient is 6 weeks post op from bilateral breast reduction  Verbal ok provided to return to normal activities but advised to start slowly to avoid injury  Verbalized understanding

## 2018-11-08 ENCOUNTER — APPOINTMENT (OUTPATIENT)
Dept: LAB | Facility: HOSPITAL | Age: 48
End: 2018-11-08
Payer: COMMERCIAL

## 2018-11-08 DIAGNOSIS — Z98.84 BARIATRIC SURGERY STATUS: ICD-10-CM

## 2018-11-08 DIAGNOSIS — K91.2 POSTSURGICAL MALABSORPTION: ICD-10-CM

## 2018-11-08 DIAGNOSIS — R79.89 LOW SERUM VITAMIN A: ICD-10-CM

## 2018-11-08 LAB
25(OH)D3 SERPL-MCNC: 42 NG/ML (ref 30–100)
ALBUMIN SERPL BCP-MCNC: 3.7 G/DL (ref 3.5–5)
ALP SERPL-CCNC: 87 U/L (ref 46–116)
ALT SERPL W P-5'-P-CCNC: 38 U/L (ref 12–78)
ANION GAP SERPL CALCULATED.3IONS-SCNC: 4 MMOL/L (ref 4–13)
AST SERPL W P-5'-P-CCNC: 29 U/L (ref 5–45)
BASOPHILS # BLD AUTO: 0.03 THOUSANDS/ΜL (ref 0–0.1)
BASOPHILS NFR BLD AUTO: 1 % (ref 0–1)
BILIRUB SERPL-MCNC: 0.57 MG/DL (ref 0.2–1)
BUN SERPL-MCNC: 12 MG/DL (ref 5–25)
CALCIUM SERPL-MCNC: 9.2 MG/DL (ref 8.3–10.1)
CHLORIDE SERPL-SCNC: 102 MMOL/L (ref 100–108)
CO2 SERPL-SCNC: 31 MMOL/L (ref 21–32)
CREAT SERPL-MCNC: 0.72 MG/DL (ref 0.6–1.3)
EOSINOPHIL # BLD AUTO: 0.09 THOUSAND/ΜL (ref 0–0.61)
EOSINOPHIL NFR BLD AUTO: 2 % (ref 0–6)
ERYTHROCYTE [DISTWIDTH] IN BLOOD BY AUTOMATED COUNT: 12.3 % (ref 11.6–15.1)
FERRITIN SERPL-MCNC: 79 NG/ML (ref 8–388)
FOLATE SERPL-MCNC: >20 NG/ML (ref 3.1–17.5)
GFR SERPL CREATININE-BSD FRML MDRD: 100 ML/MIN/1.73SQ M
GLUCOSE P FAST SERPL-MCNC: 87 MG/DL (ref 65–99)
HCT VFR BLD AUTO: 40.2 % (ref 34.8–46.1)
HGB BLD-MCNC: 13.6 G/DL (ref 11.5–15.4)
IMM GRANULOCYTES # BLD AUTO: 0.01 THOUSAND/UL (ref 0–0.2)
IMM GRANULOCYTES NFR BLD AUTO: 0 % (ref 0–2)
IRON SATN MFR SERPL: 41 %
IRON SERPL-MCNC: 131 UG/DL (ref 50–170)
LYMPHOCYTES # BLD AUTO: 2.01 THOUSANDS/ΜL (ref 0.6–4.47)
LYMPHOCYTES NFR BLD AUTO: 41 % (ref 14–44)
MCH RBC QN AUTO: 31.7 PG (ref 26.8–34.3)
MCHC RBC AUTO-ENTMCNC: 33.8 G/DL (ref 31.4–37.4)
MCV RBC AUTO: 94 FL (ref 82–98)
MONOCYTES # BLD AUTO: 0.34 THOUSAND/ΜL (ref 0.17–1.22)
MONOCYTES NFR BLD AUTO: 7 % (ref 4–12)
NEUTROPHILS # BLD AUTO: 2.41 THOUSANDS/ΜL (ref 1.85–7.62)
NEUTS SEG NFR BLD AUTO: 49 % (ref 43–75)
NRBC BLD AUTO-RTO: 0 /100 WBCS
PLATELET # BLD AUTO: 257 THOUSANDS/UL (ref 149–390)
PMV BLD AUTO: 10 FL (ref 8.9–12.7)
POTASSIUM SERPL-SCNC: 4.6 MMOL/L (ref 3.5–5.3)
PROT SERPL-MCNC: 7.1 G/DL (ref 6.4–8.2)
PTH-INTACT SERPL-MCNC: 51.7 PG/ML (ref 18.4–80.1)
RBC # BLD AUTO: 4.29 MILLION/UL (ref 3.81–5.12)
SODIUM SERPL-SCNC: 137 MMOL/L (ref 136–145)
TIBC SERPL-MCNC: 322 UG/DL (ref 250–450)
VIT B12 SERPL-MCNC: 1996 PG/ML (ref 100–900)
WBC # BLD AUTO: 4.89 THOUSAND/UL (ref 4.31–10.16)

## 2018-11-08 PROCEDURE — 82746 ASSAY OF FOLIC ACID SERUM: CPT

## 2018-11-08 PROCEDURE — 83540 ASSAY OF IRON: CPT

## 2018-11-08 PROCEDURE — 85025 COMPLETE CBC W/AUTO DIFF WBC: CPT

## 2018-11-08 PROCEDURE — 83550 IRON BINDING TEST: CPT

## 2018-11-08 PROCEDURE — 36415 COLL VENOUS BLD VENIPUNCTURE: CPT

## 2018-11-08 PROCEDURE — 82306 VITAMIN D 25 HYDROXY: CPT

## 2018-11-08 PROCEDURE — 83970 ASSAY OF PARATHORMONE: CPT

## 2018-11-08 PROCEDURE — 82728 ASSAY OF FERRITIN: CPT

## 2018-11-08 PROCEDURE — 82607 VITAMIN B-12: CPT

## 2018-11-08 PROCEDURE — 84590 ASSAY OF VITAMIN A: CPT

## 2018-11-08 PROCEDURE — 80053 COMPREHEN METABOLIC PANEL: CPT

## 2018-11-08 PROCEDURE — 84425 ASSAY OF VITAMIN B-1: CPT

## 2018-11-11 LAB
VIT A SERPL-MCNC: 48 UG/DL (ref 33.1–100)
VIT B1 BLD-SCNC: 191.6 NMOL/L (ref 66.5–200)

## 2018-11-13 ENCOUNTER — OFFICE VISIT (OUTPATIENT)
Dept: URGENT CARE | Age: 48
End: 2018-11-13
Payer: COMMERCIAL

## 2018-11-13 VITALS
WEIGHT: 131.4 LBS | SYSTOLIC BLOOD PRESSURE: 120 MMHG | BODY MASS INDEX: 24.81 KG/M2 | HEART RATE: 61 BPM | OXYGEN SATURATION: 95 % | DIASTOLIC BLOOD PRESSURE: 60 MMHG | RESPIRATION RATE: 17 BRPM | HEIGHT: 61 IN | TEMPERATURE: 97.8 F

## 2018-11-13 DIAGNOSIS — H10.9 CONJUNCTIVITIS OF RIGHT EYE, UNSPECIFIED CONJUNCTIVITIS TYPE: Primary | ICD-10-CM

## 2018-11-13 PROCEDURE — G0382 LEV 3 HOSP TYPE B ED VISIT: HCPCS | Performed by: FAMILY MEDICINE

## 2018-11-13 PROCEDURE — 99283 EMERGENCY DEPT VISIT LOW MDM: CPT | Performed by: FAMILY MEDICINE

## 2018-11-13 RX ORDER — ERYTHROMYCIN 5 MG/G
OINTMENT OPHTHALMIC
Qty: 3.5 G | Refills: 0 | Status: SHIPPED | OUTPATIENT
Start: 2018-11-13 | End: 2019-01-18

## 2018-11-13 RX ORDER — CETIRIZINE HYDROCHLORIDE 10 MG/1
10 TABLET ORAL DAILY PRN
COMMUNITY
End: 2020-11-13

## 2018-11-13 NOTE — PROGRESS NOTES
[facility]        NAME: Michelle Restrepo is a 53 y o  female  : 1970    MRN: [id number]  DATE: 2018  TIME: 1:40 PM    Assessment and Plan   Conjunctivitis of right eye, unspecified conjunctivitis type [H10 9]  1  Conjunctivitis of right eye, unspecified conjunctivitis type  erythromycin (ILOTYCIN) ophthalmic ointment         Patient Instructions     Use eyedrops or ointment in affected eyes as prescribed  Follow-up with Optometrist in the next 1-2 days for further evaluation and treatment  Call [facility] to schedule an appointment: [phone]    Go to the ED if any intractable fevers, unable to stay hydrated, change in vision, headache, facial swelling, abdominal pain, chest pain, shortness of breath, new or worsening symptoms or other concerning symptoms  Chief Complaint     Chief Complaint   Patient presents with    Eye Pain     Pt reports of right eye pain with drainage  S/S started yesterday  Pt denies any congestion but reports of allergies  History of Present Illness       Eye Pain    The right eye is affected  This is a new problem  The current episode started yesterday  The problem occurs intermittently  Progression since onset: States yesterday her eye was itchy, today she woke up with her right eye crusted shut" There was no injury mechanism (Denies any injury, denies any foreign body sensation, no trauma, does not wear contact lenses or glasses)  The pain is at a severity of 0/10  The patient is experiencing no pain (But has been rubbing her eye from the itchiness)  There is no known exposure (But states it seems like the last time she had pinkeye) to pink eye  She does not wear contacts  Associated symptoms include an eye discharge (Woke up with right eye crusted shut), itching and a recent URI (States she has had nasal congestion and rhinorrhea since last week)   Pertinent negatives include no blurred vision, double vision, fever, foreign body sensation, nausea, photophobia, vomiting or weakness  Treatments tried: Tried some over-the-counter eyedrops  The treatment provided no relief  Review of Systems   Review of Systems   Constitutional: Negative for chills and fever  HENT: Positive for congestion and rhinorrhea  Negative for ear pain, facial swelling, sinus pain, sinus pressure, sore throat, trouble swallowing and voice change  Eyes: Positive for discharge (Woke up with right eye crusted shut) and itching  Negative for blurred vision, double vision, photophobia, pain and visual disturbance  Respiratory: Negative for cough and shortness of breath  Cardiovascular: Negative for chest pain  Gastrointestinal: Negative for abdominal pain, diarrhea, nausea and vomiting  Musculoskeletal: Negative for back pain and neck pain  Skin: Negative for rash  Neurological: Negative for dizziness, syncope, weakness, numbness and headaches  All other systems reviewed and are negative  Current Medications       Current Outpatient Prescriptions:     Biotin w/ Vitamins C & E (HAIR/SKIN/NAILS) 1250-7 5-7 5 MCG-MG-UNT CHEW, Chew 1 tablet 2 (two) times a day  , Disp: , Rfl:     Calcium Citrate-Vitamin D (CALCIUM CITRATE+D3 PO), Take 1 tablet by mouth 3 (three) times a day, Disp: , Rfl:     cetirizine (ZyrTEC) 10 mg tablet, Take 10 mg by mouth daily as needed for allergies, Disp: , Rfl:     DAILY MULTIPLE VITAMINS/IRON PO, Take by mouth, Disp: , Rfl:     Melatonin 10 MG TABS, Take 10 mg by mouth daily at bedtime as needed, Disp: , Rfl:     NON FORMULARY, Take 1 capsule by mouth daily, Disp: , Rfl:     saccharomyces boulardii (FLORASTOR) 250 mg capsule, Take 250 mg by mouth 2 (two) times a day, Disp: , Rfl:     erythromycin (ILOTYCIN) ophthalmic ointment, Apply 0 5cm ribbon to affected eye every 2-4 hours while awake for the first 2 days, then every 4-6 hours for the next 5 days   Max 6x/day , Disp: 3 5 g, Rfl: 0    Current Allergies     Allergies as of 2018 - Reviewed 2018   Allergen Reaction Noted    Latex Itching 2017            The following portions of the patient's history were reviewed and updated as appropriate: allergies, current medications, past family history, past medical history, past social history, past surgical history and problem list      Past Medical History:   Diagnosis Date    Achilles tendinitis     right     Allergic rhinitis     Anxiety     Back pain     Back pain     Carpal tunnel syndrome     Last Assessed: 2016    Depression     Generalized obesity     Last Assessed: 10/5/2016    Palpitations     Last Assessed: 2013    Plantar fasciitis     bilateral    Positive PPD     Last Assessed: 10/21/2014    Tendonitis, Achilles     Last Assessed: 3/3/2017    Varicose vein of leg     Viral warts     Last Assessed: 2013       Past Surgical History:   Procedure Laterality Date    CARPAL TUNNEL RELEASE       SECTION      x3    ESOPHAGOGASTRODUODENOSCOPY N/A 2017    Procedure: ESOPHAGOGASTRODUODENOSCOPY (EGD); Surgeon: Jason Keating MD;  [facility];  Service: [address] IR IMAGE GUIDED ASPIRATION / DRAINAGE W TUBE  2018    NOSE SURGERY      PARAESOPHAGEAL HERNIA REPAIR N/A 2017    Procedure: REPAIR HERNIA PARAESOPHAGEAL  LAPAROSCOPIC;  Surgeon: Jason Keating MD;  [facility];  Service: Vanetta Sheets ID EGD TRANSORAL BIOPSY SINGLE/MULTIPLE N/A 2017    Procedure: ESOPHAGOGASTRODUODENOSCOPY (EGD) with biopsy;  Surgeon: Jason Keating MD;  [facility];   Service: Bariatrics    ID LAP GASTRIC BYPASS/MARCIN-EN-Y N/A 2017    Procedure: BYPASS GASTRIC  MARCIN-EN-Y LAPAROSCOPIC;  Surgeon: Jason Keating MD;  [facility];  Service: Bariatrics    ID REDUCTION OF LARGE BREAST Bilateral 2018    Procedure: REDUCTION MAMMOPLASTY BREAST;  Surgeon: Fatmata Munoz MD;  [facility]; Service: Plastics    [place] WRIST [place] Estevez LIG Right 1/27/2017    Procedure: ENDOSCOPIC CARPAL TUNNEL RELEASE ;  Surgeon: Nabeel Valverde MD;  [facility];  Service: Orthopedics    [place] WRIST [place] LIG Left 6/16/2017    Procedure: ENDOSCOPIC CARPAL TUNNEL RELEASE;  Surgeon: Nabeel Valverde MD;  [facility];  Service: Orthopedics    TONSILLECTOMY      TUBAL LIGATION      WISDOM TOOTH EXTRACTION         Family History   Problem Relation Age of Onset    Diabetes Mother     Heart disease Mother     Stroke Mother         Syndrome    Heart disease Father     Hypertension Father     Kidney disease Father     Stroke Maternal Grandmother         Syndrome    Alzheimer's disease Maternal Grandfather     Arthritis Maternal Aunt     Diabetes Maternal Aunt     Hypertension Maternal Aunt          Medications have been verified  Objective   /60 (BP Location: Right arm, Patient Position: Sitting, Cuff Size: Standard)   Pulse 61   Temp 97 8 °F (36 6 °C) (Oral)   Resp 17   Ht 5' 1" (1 549 m)   Wt 59 6 kg (131 lb 6 4 oz)   LMP 03/14/2018 (Approximate)   SpO2 95%   BMI 24 83 kg/m²        Physical Exam     Physical Exam   Constitutional: She is oriented to person, place, and time  She appears well-developed and well-nourished  No distress  HENT:   Head: Normocephalic and atraumatic  Mouth/Throat: Oropharynx is clear and moist    Eyes: Pupils are equal, round, and reactive to light  EOM are normal    Right conjunctiva is erythematous  Scant amount of dried crusty discharge visible on outer right eyelashes  No nystagmus  No foreign bodies visualized  No periorbital/facial erythema, tenderness or warmth  No signs of cellulitis   Neck: Normal range of motion  Neck supple  Cardiovascular: Normal rate and regular rhythm  Pulmonary/Chest: Effort normal and breath sounds normal    Abdominal: Soft  Bowel sounds are normal  There is no tenderness  Musculoskeletal: Normal range of motion  Neurological: She is alert and oriented to person, place, and time  She has normal reflexes  Skin: Skin is warm and dry  Psychiatric: She has a normal mood and affect  Her behavior is normal    Nursing note and vitals reviewed  Foreign body removal  Date/Time: 11/13/2018 1:46 PM  Performed by: Saud Randall  Authorized by: Jaki aLnd Protocol:Consent: Verbal consent obtained  Risks and benefits: risks, benefits and alternatives were discussed  Consent given by: patient  Patient understanding: patient states understanding of the procedure being performed  Patient identity confirmed: verbally with patient    Body area: eye  Location details: right conjunctiva    Anesthesia:  Local Anesthetic: proparacaine drops  Anesthetic total (drops): 3Localization method: eyelid eversion and visualized  Eye examined with fluorescein  No fluorescein uptake  Dressing: antibiotic drops  0 objects recovered  Objects recovered: No foreign bodies visualized  Comments: Eye washed, no FB visualized  Exam consistent with pinkeye/conjunctivitis  Patient tolerated well    Vision intact postprocedure     No signs of corneal abrasion

## 2018-11-20 ENCOUNTER — OFFICE VISIT (OUTPATIENT)
Dept: BARIATRICS | Facility: CLINIC | Age: 48
End: 2018-11-20
Payer: COMMERCIAL

## 2018-11-20 VITALS
WEIGHT: 133.5 LBS | SYSTOLIC BLOOD PRESSURE: 116 MMHG | DIASTOLIC BLOOD PRESSURE: 64 MMHG | HEART RATE: 60 BPM | HEIGHT: 61 IN | TEMPERATURE: 97.4 F | BODY MASS INDEX: 25.2 KG/M2

## 2018-11-20 DIAGNOSIS — E78.5 DYSLIPIDEMIA: ICD-10-CM

## 2018-11-20 DIAGNOSIS — F17.200 SMOKER: ICD-10-CM

## 2018-11-20 DIAGNOSIS — Z72.89 ALCOHOL USE: ICD-10-CM

## 2018-11-20 DIAGNOSIS — Z98.84 BARIATRIC SURGERY STATUS: Primary | ICD-10-CM

## 2018-11-20 DIAGNOSIS — K91.2 POSTSURGICAL MALABSORPTION: ICD-10-CM

## 2018-11-20 DIAGNOSIS — K21.9 GASTROESOPHAGEAL REFLUX DISEASE, ESOPHAGITIS PRESENCE NOT SPECIFIED: ICD-10-CM

## 2018-11-20 PROBLEM — R79.89 LOW SERUM VITAMIN A: Status: RESOLVED | Noted: 2018-05-18 | Resolved: 2018-11-20

## 2018-11-20 PROBLEM — Z78.9 ALCOHOL USE: Status: ACTIVE | Noted: 2018-11-20

## 2018-11-20 PROBLEM — F10.90 ALCOHOL USE: Status: ACTIVE | Noted: 2018-11-20

## 2018-11-20 PROCEDURE — 99214 OFFICE O/P EST MOD 30 MIN: CPT | Performed by: PHYSICIAN ASSISTANT

## 2018-11-20 RX ORDER — OMEPRAZOLE 20 MG/1
20 CAPSULE, DELAYED RELEASE ORAL DAILY
Qty: 90 CAPSULE | Refills: 1 | Status: SHIPPED | OUTPATIENT
Start: 2018-11-20 | End: 2019-05-27 | Stop reason: SDUPTHER

## 2018-11-20 NOTE — ASSESSMENT & PLAN NOTE
-s/p Lauro-En-Y Gastric Bypass with paraesophageal hernia repair by Dr Florentino Favre on 11/6/2017  Overall doing Well from weight loss perspective  Unfortunately for the last 2 months she has been smoking around 2-3 cigarettes/day and reports to drink up to 7 beers in one evening on weekends  She notes she quit smoking on her own pre-op and also indicates she sees a mental health provider-encouraged her to discuss this with her mental health provider and advised on how these habits are very dangerous for her-I will also ask our  to reach out to her    Initial:212 5 lb  Current: 133 5 lb  EWL: 99%  Brandon: Current  Current BMI is Body mass index is 25 22 kg/m²      Tolerating a regular diet-yes  Eating at least 60 grams of protein per day-yes  Following 30/60 minute rule with liquids-close  Drinking at least 64 ounces of fluid per day-yes  Drinking carbonated beverages-yes-beer and soda-advised on avoidance of both-especially the beer  Sufficient exercise-yes  Using NSAIDs regularly-yes  Using nicotine-yes 2-3 cigarettes/day for the past couple of months-she denies any abdominal pain-advised on risk of of gastric ulcer/stomach perforation if she continues to smoke-will put her back on ppi until she is smoke-free  Using alcohol-yes- 7 beers per weekend-night-advised that she likely would be impaired for a couple days after this volume of alcohol and that she may not feel the effect-she has a mental health provider-and encouraged her to discuss this there-but I will also have our  reach out to her-advised of ill effects of alcohol and that she should abstain for safety

## 2018-11-20 NOTE — ASSESSMENT & PLAN NOTE
Drinking an average of 7 beers on a weekend night once/week-advised with gastric surgery small amounts can impair her and she may not feel the effect-advised she could have alcohol in her system for a couple days without feeling the effect and advised on danger in driving and other medical problems that can arise with continued alcohol intakes   She sees a mental health provider and encouraged her to discuss quitting beer/alcohol -she notes she feels she is drinking/smoking because of "stress"-referring her to our  as well

## 2018-11-20 NOTE — ASSESSMENT & PLAN NOTE
Malabsorption- patient is at risk for malabsorption of vitamins/minerals secondary to malabsorption from her procedure and restriction of intakes  Reviewed current supplements and advised on same    She is taking one procare with 45 mg of iron in it  She is taking 2 upcal calcium -advised on adding dairy daily as well    Reviewed her recent labs which are within acceptable ranges    Advised to continue current supplements-her B vitamins are good now-advised if she continues to drink excess alcohol these levels can go down too low and cause other medical problems

## 2018-11-20 NOTE — PATIENT INSTRUCTIONS
Follow-up with Corazon Peña -as scheduled  Also discuss beer/alcohol intakes and smoking with your mental health provider  Both can be dangerous for your gastric surgery  After surgery 1/2 of an alcoholic drink can cause you to be impaired/or drunk without feeling the full effect  Continued regular intakes can cause other medical problems and could cause dangerously low blood sugars  Continued smoking/even small amounts can lead to an ulcer or perforation of your stomach  You need to quit smoking  Please talk with your PCP if you are unable to quit smoking on your own  Take omeprazole daily for now to help protect your stomach until you are smoke-free and off all beer/alcohol  Please make a follow-up visit with your PCP to discuss your mood and to help you quit smoking since you feel this is related to your stress   Follow-up in one year with me  We kindly ask that you arrive 15 minutes before your scheduled appointment time with your provider to allow you to be roomed, have your vital signs checked and your chart updated by our staff  We thank you for your patience at your visit  Follow diet as discussed  Follow  vitamin and mineral recommendations as reviewed with you  Exercise as tolerated    If you have gotten a lab slip at this visit, please note that most labs are FASTING-but you need to drink water the night before and the morning before your labs are done  It is HIGHLY RECOMMENDED that you check with your insurance to make sure all the labs ordered are covered by your individual insurance policy  This is especially important if you also get labs done by other providers outside of [facility]  You want to avoid having duplicate labs done  Note you will be given a lab slip AFTER  your annual visit next year to check your vitamin and mineral levels  You will not need to make a second appointment after the labs are received/reviewed   You will receive a letter/and or phone call with your results  Most labs do NOT honor a lab slip dated one year in advance now  Call our office if he have any problems with abdominal pain especially if associated with fever, chills, nausea, vomiting or any other concerns  All  Post-bariatric surgery patients should be aware that very small quantities of any alcohol  can cause impairment and it is very possible not to feel the effect  The effect can be in the system for several hours  It is also a stomach irritant  It is advised to AVOID alcohol, Nonsteroidal antiinflammatory drugs (NSAIDS) and nicotine of all forms   Any of these can cause stomach irritation/pain

## 2018-11-20 NOTE — ASSESSMENT & PLAN NOTE
Smoking an average of 2-3 cigarettes/day over the past 2 months  Reports smoking/drinking for "stress" reduction  Advised that even small amounts of smoking can lead to a gastric ulcer and/or perforation which could be life-altering  She reports she was smoke free for around 5 years  She quit on her own in the past but has friends that smoke-encouraged to also avoid second-hand smoke  She has a mental health counselor -not sure if they are aware of effect after gastric surgery-I will also have her follow-up with our   Encouraged her to follow-up with PCP for her mood/notes depressed but not suicidal  Encouraged her to discuss smoking and alcohol cessation with PCP as well  Her vitamin B levels are ok now and she is taking bariatric supplements -advised to continue same    Greater than 50% of the 25 minute visit was spent on importance of smoking cessation and avoidance of alcohol and encouraging her to see her PCP re: mood and these issues along with regular follow-up with her mental health counselor as well as making a follow-up visit with our   She indicates she will make follow-up with her PCP and also made follow-up with our

## 2018-11-20 NOTE — ASSESSMENT & PLAN NOTE
She denies any heart burn or abdominal pain but with current smoking/drinking I will restart her ppi daily

## 2018-11-20 NOTE — PROGRESS NOTES
Assessment/Plan:    Bariatric surgery status  -s/p Lauro-En-Y Gastric Bypass with paraesophageal hernia repair by Dr Juma Mcginnis on 11/6/2017  Overall doing Well from weight loss perspective  Unfortunately for the last 2 months she has been smoking around 2-3 cigarettes/day and reports to drink up to 7 beers in one evening on weekends  She notes she quit smoking on her own pre-op and also indicates she sees a mental health provider-encouraged her to discuss this with her mental health provider and advised on how these habits are very dangerous for her-I will also ask our  to reach out to her    Initial:212 5 lb  Current: 133 5 lb  EWL: 99%  Brandon: Current  Current BMI is Body mass index is 25 22 kg/m²      Tolerating a regular diet-yes  Eating at least 60 grams of protein per day-yes  Following 30/60 minute rule with liquids-close  Drinking at least 64 ounces of fluid per day-yes  Drinking carbonated beverages-yes-beer and soda-advised on avoidance of both-especially the beer  Sufficient exercise-yes  Using NSAIDs regularly-yes  Using nicotine-yes 2-3 cigarettes/day for the past couple of months-she denies any abdominal pain-advised on risk of of gastric ulcer/stomach perforation if she continues to smoke-will put her back on ppi until she is smoke-free  Using alcohol-yes- 7 beers per weekend-night-advised that she likely would be impaired for a couple days after this volume of alcohol and that she may not feel the effect-she has a mental health provider-and encouraged her to discuss this there-but I will also have our  reach out to her-advised of ill effects of alcohol and that she should abstain for safety      Postsurgical malabsorption  Malabsorption- patient is at risk for malabsorption of vitamins/minerals secondary to malabsorption from her procedure and restriction of intakes  Reviewed current supplements and advised on same    She is taking one procare with 45 mg of iron in it  She is taking 2 upcal calcium -advised on adding dairy daily as well    Reviewed her recent labs which are within acceptable ranges    Advised to continue current supplements-her B vitamins are good now-advised if she continues to drink excess alcohol these levels can go down too low and cause other medical problems  GERD (gastroesophageal reflux disease)  She denies any heart burn or abdominal pain but with current smoking/drinking I will restart her ppi daily    Alcohol use  Drinking an average of 7 beers on a weekend night once/week-advised with gastric surgery small amounts can impair her and she may not feel the effect-advised she could have alcohol in her system for a couple days without feeling the effect and advised on danger in driving and other medical problems that can arise with continued alcohol intakes  She sees a mental health provider and encouraged her to discuss quitting beer/alcohol -she notes she feels she is drinking/smoking because of "stress"-referring her to our  as well    Smoker  Smoking an average of 2-3 cigarettes/day over the past 2 months  Reports smoking/drinking for "stress" reduction  Advised that even small amounts of smoking can lead to a gastric ulcer and/or perforation which could be life-altering  She reports she was smoke free for around 5 years  She quit on her own in the past but has friends that smoke-encouraged to also avoid second-hand smoke  She has a mental health counselor -not sure if they are aware of effect after gastric surgery-I will also have her follow-up with our   Encouraged her to follow-up with PCP for her mood/notes depressed but not suicidal  Encouraged her to discuss smoking and alcohol cessation with PCP as well      Her vitamin B levels are ok now and she is taking bariatric supplements -advised to continue same    Greater than 50% of the 25 minute visit was spent on importance of smoking cessation and avoidance of alcohol and encouraging her to see her PCP re: mood and these issues along with regular follow-up with her mental health counselor as well as making a follow-up visit with our   She indicates she will make follow-up with her PCP and also made follow-up with our   Diagnoses and all orders for this visit:    Bariatric surgery status  -     omeprazole (PriLOSEC) 20 mg delayed release capsule; Take 1 capsule (20 mg total) by mouth daily    Smoker  -     omeprazole (PriLOSEC) 20 mg delayed release capsule; Take 1 capsule (20 mg total) by mouth daily    Postsurgical malabsorption    Gastroesophageal reflux disease, esophagitis presence not specified    Dyslipidemia    Alcohol use          Subjective:      Patient ID: Tiny Bean is a 53 y o  female  She is here in routine follow-up  She is tolerating a regular diet  She is smoking 2-3 cigarettes/day and drinks beer on the weekends  Notes she is doing so because of "stress"  She denies any heart burn or abdominal pain  She is taking bariatric supplements and had her routine labs done   She is exercising regularly  Notes she feels "down" with life stressors but denies suicidal ideation  The following portions of the patient's history were reviewed and updated as appropriate: allergies, current medications, past family history, past medical history, past social history, past surgical history and problem list     Review of Systems   Constitutional: Negative for chills and fever  Unexpected weight change: planned weight loss  Respiratory: Negative for shortness of breath and wheezing  Cardiovascular: Negative for chest pain and palpitations  Gastrointestinal: Negative for abdominal pain, constipation, diarrhea, nausea and vomiting  Psychiatric/Behavioral: Negative for suicidal ideas (no complait of anxiety  but notes some  depression)           Objective:      /64 (BP Location: Left arm, Patient Position: Sitting, Cuff Size: Adult)   Pulse 60   Temp (!) 97 4 °F (36 3 °C) (Tympanic)   Ht 5' 1" (1 549 m)   Wt 60 6 kg (133 lb 8 oz)   BMI 25 22 kg/m²          Physical Exam   Constitutional: She is oriented to person, place, and time  She appears well-developed and well-nourished  HENT:   Mouth/Throat: Oropharynx is clear and moist    Eyes: Conjunctivae are normal  No scleral icterus  Cardiovascular: Normal rate, regular rhythm and normal heart sounds  Pulmonary/Chest: Effort normal and breath sounds normal    Abdominal: Soft  There is no tenderness  No incisional hernias appreciated   Musculoskeletal:   Normal gait   Neurological: She is alert and oriented to person, place, and time  Psychiatric: She has a normal mood and affect  Her behavior is normal  Judgment and thought content normal    Nursing note and vitals reviewed        Maintain weight loss with good nutrition intakes  Normal vitamin and mineral levels  Exercise as tolerated

## 2018-11-29 ENCOUNTER — OFFICE VISIT (OUTPATIENT)
Dept: BARIATRICS | Facility: CLINIC | Age: 48
End: 2018-11-29

## 2018-11-29 VITALS — HEIGHT: 61 IN | BODY MASS INDEX: 24.98 KG/M2 | WEIGHT: 132.3 LBS

## 2018-11-29 DIAGNOSIS — Z98.84 BARIATRIC SURGERY STATUS: Primary | ICD-10-CM

## 2018-11-29 PROCEDURE — RECHECK

## 2018-11-29 NOTE — PROGRESS NOTES
Met with patient and discussed the following; reviewed pre-op weight loss goals and post-op accomplishments  Patient resumed smoking and the consumption of beer on the weekends shortly after the breakup with her boyfriend several months ago  We discussed, again, the risk associated with alcohol and nicotine use post bariatric surgery  Patient initiated the separation from boyfriend, now questions her decision, her daughter and grandchildren relocated to [place] and patient is unhappy with breast reduction surgery ( 3 months ago)  Patient admits feeling depressed and at the suggestion  of her sister started counseling  She meets with a male therapistCesia Hopkins from [facility] and recently met with a psychiatrist who prescribed medication- Zolpidem Tartrate 10mg Tablet- psychiatrist Dr Salvador Hernandes [phone]  Patient stated she eliminated smoking 2 weeks ago and will now work on eliminating alcoholic beverages  I will call patient in a few weeks to check on progress   NV

## 2018-11-30 ENCOUNTER — OFFICE VISIT (OUTPATIENT)
Dept: PLASTIC SURGERY | Facility: CLINIC | Age: 48
End: 2018-11-30
Payer: COMMERCIAL

## 2018-11-30 DIAGNOSIS — Z98.890 S/P BILATERAL BREAST REDUCTION: Primary | ICD-10-CM

## 2018-11-30 PROCEDURE — 99213 OFFICE O/P EST LOW 20 MIN: CPT | Performed by: SURGERY

## 2018-12-03 VITALS — HEIGHT: 61 IN | BODY MASS INDEX: 24.92 KG/M2 | WEIGHT: 132 LBS

## 2018-12-14 ENCOUNTER — TELEPHONE (OUTPATIENT)
Dept: BARIATRICS | Facility: CLINIC | Age: 48
End: 2018-12-14

## 2018-12-14 NOTE — TELEPHONE ENCOUNTER
Outreach phone call; how is patient coming along? She provided the following; she will travel to [place](12/15/18) to visit daughter and grandchildren   ( 1 month)  She is looking forward to the holidays with her family  She continues with therapist, psychiatrist and medication  She reports feeling positive and hopeful  She continues nicotine free and no longer consumes alcoholic beverages  She sounded positive and hopeful  She and ex-boyfriend continue to share but she is taking it slow  Patient reminded SW+RD remain available if there should be a need in the future   NV

## 2018-12-19 NOTE — PROGRESS NOTES
Assessment/Plan:    S/P bilateral breast reduction  50-year-old female status post bilateral breast reduction who presents for 3 month follow-up  She has some axillary fullness bilaterally as well as pain in the bilateral scars  I recommended scar revision bilaterally to address redundancy as well as discomfort from her scar related pain  I discussed with her risks, benefits and alternatives of the procedure including not limited to risk of bleeding, infection, scar and recurrent pain  She understands and wishes to proceed  I personally obtained her informed consent  Diagnoses and all orders for this visit:    S/P bilateral breast reduction          Subjective:      Patient ID: Sherman Obando is a 53 y o  female  50-year-old female status post bilateral breast reduction who presents for 3 month follow-up  She has some axillary fullness bilaterally as well as pain in the bilateral scars  The following portions of the patient's history were reviewed and updated as appropriate: allergies, current medications, past family history, past medical history, past social history, past surgical history and problem list     Review of Systems   Constitutional: Negative  HENT: Negative  Eyes: Negative  Respiratory: Negative  Cardiovascular: Negative  Gastrointestinal: Negative  Endocrine: Negative  Genitourinary: Negative  Musculoskeletal: Negative  Skin: Negative  Allergic/Immunologic: Negative  Neurological: Negative  Hematological: Negative  Psychiatric/Behavioral: Negative  Objective:      Ht 5' 1" (1 549 m)   Wt 59 9 kg (132 lb)   BMI 24 94 kg/m²          Physical Exam   Constitutional: She is oriented to person, place, and time  She appears well-developed and well-nourished  HENT:   Head: Normocephalic and atraumatic  Eyes: Pupils are equal, round, and reactive to light  Conjunctivae are normal    Neck: Normal range of motion     Cardiovascular: Normal rate and regular rhythm  Pulmonary/Chest: Effort normal and breath sounds normal    Abdominal: Soft  Bowel sounds are normal    Musculoskeletal: Normal range of motion  Neurological: She is alert and oriented to person, place, and time  Skin: Skin is warm and dry  Tenderness at lateral aspect of breast reduction scar bilaterally  Axillary fullness bilaterally  Psychiatric: She has a normal mood and affect   Her behavior is normal  no

## 2018-12-19 NOTE — ASSESSMENT & PLAN NOTE
71-year-old female status post bilateral breast reduction who presents for 3 month follow-up  She has some axillary fullness bilaterally as well as pain in the bilateral scars  I recommended scar revision bilaterally to address redundancy as well as discomfort from her scar related pain  I discussed with her risks, benefits and alternatives of the procedure including not limited to risk of bleeding, infection, scar and recurrent pain  She understands and wishes to proceed  I personally obtained her informed consent

## 2019-01-15 ENCOUNTER — OFFICE VISIT (OUTPATIENT)
Dept: FAMILY MEDICINE CLINIC | Facility: CLINIC | Age: 49
End: 2019-01-15

## 2019-01-15 VITALS
BODY MASS INDEX: 24.51 KG/M2 | RESPIRATION RATE: 14 BRPM | SYSTOLIC BLOOD PRESSURE: 124 MMHG | TEMPERATURE: 98.1 F | DIASTOLIC BLOOD PRESSURE: 80 MMHG | WEIGHT: 133.2 LBS | HEART RATE: 72 BPM | HEIGHT: 62 IN

## 2019-01-15 DIAGNOSIS — N64.4 BILATERAL MASTODYNIA: Primary | ICD-10-CM

## 2019-01-15 DIAGNOSIS — Z71.1 CONCERN ABOUT STD IN FEMALE WITHOUT DIAGNOSIS: ICD-10-CM

## 2019-01-15 DIAGNOSIS — Z98.890 S/P BILATERAL BREAST REDUCTION: ICD-10-CM

## 2019-01-15 DIAGNOSIS — B37.2 CANDIDIASIS, INTERTRIGO: ICD-10-CM

## 2019-01-15 DIAGNOSIS — G89.29 CHRONIC MIDLINE THORACIC BACK PAIN: ICD-10-CM

## 2019-01-15 DIAGNOSIS — M54.6 CHRONIC MIDLINE THORACIC BACK PAIN: ICD-10-CM

## 2019-01-15 PROCEDURE — 99213 OFFICE O/P EST LOW 20 MIN: CPT | Performed by: FAMILY MEDICINE

## 2019-01-15 RX ORDER — ZOLPIDEM TARTRATE 10 MG/1
10 TABLET ORAL DAILY
Refills: 1 | COMMUNITY
Start: 2018-12-27 | End: 2019-12-09 | Stop reason: SINTOL

## 2019-01-15 RX ORDER — CITALOPRAM 20 MG/1
20 TABLET ORAL DAILY
Refills: 1 | COMMUNITY
Start: 2018-12-27 | End: 2019-04-08

## 2019-01-15 RX ORDER — NYSTATIN 100000 [USP'U]/G
POWDER TOPICAL 3 TIMES DAILY
Qty: 15 G | Refills: 0 | Status: SHIPPED | OUTPATIENT
Start: 2019-01-15 | End: 2019-01-18

## 2019-01-15 NOTE — PROGRESS NOTES
ASSESSMENT/PLAN  Problem List Items Addressed This Visit     Chronic back pain - Primary     - Thought to be 2/2 to large pedulous D cup breast  - Now 3-4 month s/p breast reduction and patient still suffering with mid thoracic pain  - Xray from 11/26/16 demonstrated  mild S-shaped scoliosis and mid cervical discogenic disease   - Will refer to spine specialist/ ortho for evaluation  - Avoid NSAIDs given history of bariatric surgery  Tylenol as needed or topical OTC analgesic          Relevant Orders    Ambulatory Referral to Comprehensive Spine Program    S/P bilateral breast reduction     Breast reduction 3-4 months ago with residual pain along the scar  Seen by plastic surgeon for follow-up on 12/19  Suggested scar revision to address mid axilla fullness and discomfort  Patient is here for 2nd opinion on whether to proceed with the scar revision  On physical exam scar looks well healed without signs of dehiscence or cellulitis  There is some fibrous tissue along the incision underneath each breast suggesting scar tissue  Patient also unable to raise the arm more than 90° due to pain in a tightness along the mid axillary region  Personally I did not feel comfortable giving suggestions advise her to follow up with her plastic surgeon or consult with another another plastic surgeon before proceeding with the procedure  In terms of pain management, there are no indication for narcotics as it has been several months after the procedure  Would suggest that she use Tylenol as NSAIDs are contraindicated in patients with previous bariatric surgery  Relevant Orders    Ambulatory referral to Physical Therapy    Bilateral mastodynia    Relevant Orders    Ambulatory referral to Physical Therapy    Concern about STD in female without diagnosis     Patient reports unprotected sex 1 month ago  2 partners in the last 12 months   Asymptomatic but would like a complete STI panel    RPR, GC/Chlamydia, and HIV ordered          Relevant Orders    HIV 1/2 AG-AB combo    RPR    Chlamydia/GC amplified DNA by PCR    Candidiasis, intertrigo     Recurrent incision is candidiasis due to redundant abdominal tissue after loosing weight  - Area macerated and slightly tender   - Plastic surgeon suggest abdominoplasty which also believe will reduce the number of infections significantly   - Will treat with Nystatin powder          Relevant Medications    nystatin (MYCOSTATIN) powder              Future Appointments  Date Time Provider Radha Ginger   1/24/2019 9:45 AM Jeanine Wyatt MD [facility]   4/8/2019 1:20 PM Zeeshan Rajput MD [facility]   11/20/2019 10:00 AM Yue Roberson PA-C [facility]          SUBJECTIVE  CC: Breast pain and STD check     HPI:  Destiny Hewitt is a 53 y o  female s/p breast reduction 4 months ago and bariatric surgery year ago who presents with b/l breast tenderness since her surgery  Patient states she had a 3  Month post op f/u with her surgeon and mentioned the pain  Per chart review, they suggested scar revision  Unfortunately, insurance did not approve the procedure and was told to follow up with PCP to look at other etiologies and provide pain management  Currently, she describes tingling around the nipple and pain on the side of the breast with restricted arm movement above the head due to a tightness on the side of the chest  She also notes redness and irritation in the pannus of her stomach  This happens periodically and a tummy tuck was suggested by plastic surgery to reduce irritiation in the panus  As a result of all of this pain has been depressed and traumatized but the experience  She has been going o therapy and was prescribed Celexa 2 months ago  Pretty much patient is here for a second opinion regarding next steps  Also would like STD testing after having unprotected sex      Review of Systems   Constitutional: Negative for activity change, chills, fever and unexpected weight change  HENT: Negative for congestion, postnasal drip, sinus pressure, sore throat and trouble swallowing  Eyes: Negative for visual disturbance  Respiratory: Positive for chest tightness  Negative for shortness of breath and wheezing  Cardiovascular: Negative for chest pain, palpitations and leg swelling  Gastrointestinal: Negative for abdominal pain, constipation, diarrhea, nausea and vomiting  Genitourinary: Negative for decreased urine volume, difficulty urinating, dysuria, frequency, urgency and vaginal discharge  Musculoskeletal: Negative for arthralgias and gait problem  Skin: Positive for color change and rash  Allergic/Immunologic: Negative for environmental allergies  Psychiatric/Behavioral: Positive for agitation  Negative for self-injury, sleep disturbance and suicidal ideas  The patient is nervous/anxious  Historical Information   The patient history was reviewed as follows:  Past Medical History:   Diagnosis Date    Achilles tendinitis     right     Allergic rhinitis     Anxiety     Back pain     Back pain     Bariatric surgery status     Carpal tunnel syndrome     Last Assessed: 2016    Depression     Generalized obesity     Last Assessed: 10/5/2016    Palpitations     Last Assessed: 2013    Plantar fasciitis     bilateral    Positive PPD     Last Assessed: 10/21/2014    Postgastrectomy malabsorption     Tendonitis, Achilles     Last Assessed: 3/3/2017    Varicose vein of leg     Viral warts     Last Assessed: 2013         Past Surgical History:   Procedure Laterality Date    CARPAL TUNNEL RELEASE       SECTION      x3    ESOPHAGOGASTRODUODENOSCOPY N/A 2017    Procedure: ESOPHAGOGASTRODUODENOSCOPY (EGD);   Surgeon: Yandel Castle MD;  Location: [facility];  Service: [facility]       IR IMAGE GUIDED ASPIRATION / DRAINAGE W TUBE  2018    NOSE SURGERY      PARAESOPHAGEAL HERNIA REPAIR N/A 11/6/2017    Procedure: REPAIR HERNIA PARAESOPHAGEAL  LAPAROSCOPIC;  Surgeon: Catie Crocker MD;  [facility];  Service: Bariatrics    SD EGD TRANSORAL BIOPSY SINGLE/MULTIPLE N/A 8/2/2017    Procedure: ESOPHAGOGASTRODUODENOSCOPY (EGD) with biopsy;  Surgeon: Catie Crocker MD;  [facility];   Service: Bariatrics    SD LAP GASTRIC BYPASS/MARCIN-EN-Y N/A 11/6/2017    Procedure: BYPASS GASTRIC  MARCIN-EN-Y LAPAROSCOPIC;  Surgeon: Catie Crocker MD;  [facility];  Service: Bariatrics    SD REDUCTION OF LARGE BREAST Bilateral 8/14/2018    Procedure: REDUCTION MAMMOPLASTY BREAST;  Surgeon: Wicho Ragsdale MD;  [facility];  Service: Plastics    SD WRIST Dariusz Narrow LIG Right 1/27/2017    Procedure: ENDOSCOPIC CARPAL TUNNEL RELEASE ;  Surgeon: Loretta Sapp MD;  [facility];  Service: Orthopedics    SD WRIST Dariusz Narrow LIG Left 6/16/2017    Procedure: ENDOSCOPIC CARPAL TUNNEL RELEASE;  Surgeon: Loretta Sapp MD;  [facility];  Service: Orthopedics    TONSILLECTOMY      TUBAL LIGATION      WISDOM TOOTH EXTRACTION       Family History   Problem Relation Age of Onset    Diabetes Mother     Heart disease Mother     Stroke Mother         Syndrome    Heart disease Father     Hypertension Father     Kidney disease Father     Stroke Maternal Grandmother         Syndrome    Alzheimer's disease Maternal Grandfather     Arthritis Maternal Aunt     Diabetes Maternal Aunt     Hypertension Maternal Aunt       Social History   History   Alcohol Use    Yes     Comment: social     History   Drug Use No     History   Smoking Status    Current Some Day Smoker    Types: Cigarettes    Last attempt to quit: 1/27/2014   Smokeless Tobacco    Never Used     Comment: social smoker       Medications:     Current Outpatient Prescriptions:     Biotin w/ Vitamins C & E (HAIR/SKIN/NAILS) 1250-7 5-7 5 MCG-MG-UNT CHEW, Chew 1 tablet 2 (two) times a day  , Disp: , Rfl:     Calcium Citrate-Vitamin D (CALCIUM CITRATE+D3 PO), Take 1 tablet by mouth 3 (three) times a day, Disp: , Rfl:     DAILY MULTIPLE VITAMINS/IRON PO, Take by mouth, Disp: , Rfl:     Melatonin 10 MG TABS, Take 10 mg by mouth daily at bedtime as needed, Disp: , Rfl:     NON FORMULARY, Take 1 capsule by mouth daily, Disp: , Rfl:     omeprazole (PriLOSEC) 20 mg delayed release capsule, Take 1 capsule (20 mg total) by mouth daily, Disp: 90 capsule, Rfl: 1    cetirizine (ZyrTEC) 10 mg tablet, Take 10 mg by mouth daily as needed for allergies, Disp: , Rfl:     citalopram (CeleXA) 20 mg tablet, Take 20 mg by mouth daily, Disp: , Rfl: 1    erythromycin (ILOTYCIN) ophthalmic ointment, Apply 0 5cm ribbon to affected eye every 2-4 hours while awake for the first 2 days, then every 4-6 hours for the next 5 days  Max 6x/day  (Patient not taking: Reported on 1/15/2019 ), Disp: 3 5 g, Rfl: 0    nystatin (MYCOSTATIN) powder, Apply topically 3 (three) times a day, Disp: 15 g, Rfl: 0    saccharomyces boulardii (FLORASTOR) 250 mg capsule, Take 250 mg by mouth 2 (two) times a day, Disp: , Rfl:     zolpidem (AMBIEN) 10 mg tablet, Take 10 mg by mouth daily, Disp: , Rfl: 1    Allergies   Allergen Reactions    Latex Itching       OBJECTIVE  Vitals:   Vitals:    01/15/19 1351   BP: 124/80   BP Location: Right arm   Patient Position: Sitting   Cuff Size: Standard   Pulse: 72   Resp: 14   Temp: 98 1 °F (36 7 °C)   TempSrc: Tympanic   Weight: 60 4 kg (133 lb 3 2 oz)   Height: 5' 1 8" (1 57 m)         Physical Exam   Constitutional: She appears well-developed and well-nourished  No distress  HENT:   Head: Normocephalic and atraumatic  Cardiovascular: Normal rate, regular rhythm, normal heart sounds and intact distal pulses  Pulmonary/Chest: Effort normal and breath sounds normal  No respiratory distress  She has no wheezes  She has no rales  She exhibits no tenderness   Right breast exhibits tenderness (tenderness along the incsiion underneath the breast and fibrous tissue notes the scar laterally )  Left breast exhibits tenderness (same noted along the incision under the left breast )  Incision well healed, dry, and intact  No cellulitic skin changes noted    Abdominal:   Redundant tissue and redness in the pannus of the stomach  Maceration also appreciated in some areas along the pannus    Skin: Skin is warm  Rash noted  There is erythema  Psychiatric: Judgment and thought content normal  Cognition and memory are normal  She exhibits a depressed mood  Tearful    Vitals reviewed                   Huong Edwards MD, PGY-2  [facility]    1/15/19

## 2019-01-16 ENCOUNTER — TELEPHONE (OUTPATIENT)
Dept: FAMILY MEDICINE CLINIC | Facility: CLINIC | Age: 49
End: 2019-01-16

## 2019-01-16 DIAGNOSIS — K62.89 CHRONIC IDIOPATHIC ANAL PAIN: ICD-10-CM

## 2019-01-16 DIAGNOSIS — G89.29 CHRONIC IDIOPATHIC ANAL PAIN: ICD-10-CM

## 2019-01-16 DIAGNOSIS — M54.6 PAIN IN THORACIC SPINE: Primary | ICD-10-CM

## 2019-01-16 DIAGNOSIS — N64.9 BREAST DISORDER: ICD-10-CM

## 2019-01-16 NOTE — TELEPHONE ENCOUNTER
Dr Kaylyn Lauren,  patient requesting 2 referrals on revised and another generated    As followed:       Dr Mikayla Teixeira (NPI [id number])  (appt 01/24/19)         Revised to a different doctor        [place] [id number] ([address])      [phone] (f)       Dr Lorna Tadeo  (appt 02/27/19)        2nd opinion breast reduction complications        [address] , [address] (S)       [phone] (f)

## 2019-01-17 NOTE — ASSESSMENT & PLAN NOTE
Patient reports unprotected sex 1 month ago  2 partners in the last 12 months   Asymptomatic but would like a complete STI panel    RPR, GC/Chlamydia, and HIV ordered

## 2019-01-17 NOTE — ASSESSMENT & PLAN NOTE
Breast reduction 3-4 months ago with residual pain along the scar  Seen by plastic surgeon for follow-up on 12/19  Suggested scar revision to address mid axilla fullness and discomfort  Patient is here for 2nd opinion on whether to proceed with the scar revision  On physical exam scar looks well healed without signs of dehiscence or cellulitis  There is some fibrous tissue along the incision underneath each breast suggesting scar tissue  Patient also unable to raise the arm more than 90° due to pain in a tightness along the mid axillary region  Personally I did not feel comfortable giving suggestions advise her to follow up with her plastic surgeon or consult with another another plastic surgeon before proceeding with the procedure  In terms of pain management, there are no indication for narcotics as it has been several months after the procedure  Would suggest that she use Tylenol as NSAIDs are contraindicated in patients with previous bariatric surgery

## 2019-01-17 NOTE — ASSESSMENT & PLAN NOTE
Recurrent incision is candidiasis due to redundant abdominal tissue after loosing weight  - Area macerated and slightly tender   - Plastic surgeon suggest abdominoplasty which also believe will reduce the number of infections significantly   - Will treat with Nystatin powder

## 2019-01-17 NOTE — ASSESSMENT & PLAN NOTE
- Thought to be 2/2 to large pedulous D cup breast  - Now 3-4 month s/p breast reduction and patient still suffering with mid thoracic pain  - Xray from 11/26/16 demonstrated  mild S-shaped scoliosis and mid cervical discogenic disease   - Will refer to spine specialist/ ortho for evaluation  - Avoid NSAIDs given history of bariatric surgery   Tylenol as needed or topical OTC analgesic

## 2019-01-18 ENCOUNTER — APPOINTMENT (OUTPATIENT)
Dept: RADIOLOGY | Age: 49
End: 2019-01-18
Payer: COMMERCIAL

## 2019-01-18 ENCOUNTER — TELEPHONE (OUTPATIENT)
Dept: URGENT CARE | Age: 49
End: 2019-01-18

## 2019-01-18 ENCOUNTER — OFFICE VISIT (OUTPATIENT)
Dept: URGENT CARE | Age: 49
End: 2019-01-18
Payer: COMMERCIAL

## 2019-01-18 VITALS
TEMPERATURE: 102 F | DIASTOLIC BLOOD PRESSURE: 68 MMHG | HEIGHT: 61 IN | SYSTOLIC BLOOD PRESSURE: 115 MMHG | WEIGHT: 126 LBS | BODY MASS INDEX: 23.79 KG/M2 | HEART RATE: 95 BPM | RESPIRATION RATE: 18 BRPM | OXYGEN SATURATION: 94 %

## 2019-01-18 DIAGNOSIS — R50.9 FEVER, UNSPECIFIED FEVER CAUSE: ICD-10-CM

## 2019-01-18 DIAGNOSIS — J18.9 PNEUMONIA OF LEFT LOWER LOBE DUE TO INFECTIOUS ORGANISM: Primary | ICD-10-CM

## 2019-01-18 DIAGNOSIS — R05.9 COUGH: ICD-10-CM

## 2019-01-18 PROCEDURE — 71046 X-RAY EXAM CHEST 2 VIEWS: CPT

## 2019-01-18 PROCEDURE — 99213 OFFICE O/P EST LOW 20 MIN: CPT | Performed by: FAMILY MEDICINE

## 2019-01-18 PROCEDURE — 99283 EMERGENCY DEPT VISIT LOW MDM: CPT | Performed by: FAMILY MEDICINE

## 2019-01-18 PROCEDURE — G0382 LEV 3 HOSP TYPE B ED VISIT: HCPCS | Performed by: FAMILY MEDICINE

## 2019-01-18 RX ORDER — DOXYCYCLINE 100 MG/1
100 TABLET ORAL 2 TIMES DAILY
Qty: 20 TABLET | Refills: 0 | Status: SHIPPED | OUTPATIENT
Start: 2019-01-18 | End: 2019-01-28

## 2019-01-18 RX ORDER — ACETAMINOPHEN 325 MG/1
650 TABLET ORAL ONCE
Status: COMPLETED | OUTPATIENT
Start: 2019-01-18 | End: 2019-01-18

## 2019-01-18 RX ADMIN — ACETAMINOPHEN 650 MG: 325 TABLET ORAL at 17:20

## 2019-01-18 NOTE — PROGRESS NOTES
[facility]        NAME: Shruthi Oshea is a 53 y o  female  : 1970    MRN: [id number]  DATE: 2019  TIME: 6:03 PM    Assessment and Plan   Pneumonia of left lower lobe due to infectious organism ([facility] ) [J18 1]  1  Pneumonia of left lower lobe due to infectious organism ([facility] )  doxycycline (ADOXA) 100 MG tablet   2  Cough  XR chest pa & lateral   3  Fever, unspecified fever cause  acetaminophen (TYLENOL) tablet 650 mg         Patient Instructions     Patient Instructions   Chest xray shows left lower lobe pneumonia  Will start antibiotic  Rest and drink extra fluids  OTC cough and cold medications as needed  Tylenol as needed for pain or fever  Follow up with PCP for recheck in 3-5 days as the may want to repeat xray to ensure resolution  Go to ER with persistent fever, SOB, chest apin, difficulty breathing or any worsening symptoms  Chief Complaint     Chief Complaint   Patient presents with    Influenza     pt c/o chills, fever, body aches, headahce, runny nose with clear mucus, some dizziness, sweating, and dry cough         History of Present Illness   Shruthi Oshea presents to the clinic c/o    This is a 53year old female here today with fever, chills, dry cough, bodyaches and headache  Symptoms started about 3 days ago  She has nasal congestion  She has been using Tylenol as needed for fever  She has been using Robitussin for cough  Tmax 103  She has been exposed to bronchitis  She has not had influenza vaccine this year  No nausea, vomiting or diarrhea  No SOB or chest pain  Review of Systems   Review of Systems   Constitutional: Positive for activity change, chills, fatigue and fever  HENT: Positive for congestion and sore throat  Negative for sinus pain and sinus pressure  Respiratory: Positive for cough  Negative for chest tightness, shortness of breath and wheezing  Cardiovascular: Negative      Neurological: Positive for headaches  Psychiatric/Behavioral: Negative  Current Medications     Long-Term Prescriptions   Medication Sig Dispense Refill    cetirizine (ZyrTEC) 10 mg tablet Take 10 mg by mouth daily as needed for allergies      citalopram (CeleXA) 20 mg tablet Take 20 mg by mouth daily  1    omeprazole (PriLOSEC) 20 mg delayed release capsule Take 1 capsule (20 mg total) by mouth daily 90 capsule 1    zolpidem (AMBIEN) 10 mg tablet Take 10 mg by mouth daily  1       Current Allergies     Allergies as of 01/18/2019 - Reviewed 01/18/2019   Allergen Reaction Noted    Latex Itching 11/06/2017    Nsaids  01/18/2019            The following portions of the patient's history were reviewed and updated as appropriate: allergies, current medications, past family history, past medical history, past social history, past surgical history and problem list     Objective   /68 (BP Location: Right arm, Patient Position: Sitting, Cuff Size: Standard)   Pulse 95   Temp (!) 102 °F (38 9 °C) (Oral)   Resp 18   Ht 5' 1" (1 549 m)   Wt 57 2 kg (126 lb)   LMP  (LMP Unknown)   SpO2 94%   Breastfeeding? No   BMI 23 81 kg/m²        Physical Exam     Physical Exam   Constitutional: She is oriented to person, place, and time  Appears ill    HENT:   Head: Normocephalic  Right Ear: External ear normal    Left Ear: External ear normal    Neck: Normal range of motion  Neck supple  Cardiovascular: Normal rate, regular rhythm and normal heart sounds  Pulmonary/Chest: Effort normal  No respiratory distress  Wheezes: right upper lobe  She has rales (right upper lobe)  Abdominal: Soft  Bowel sounds are normal    Musculoskeletal: Normal range of motion  Neurological: She is alert and oriented to person, place, and time  Psychiatric: She has a normal mood and affect   Her behavior is normal      Chest xray: left lower pneumonia

## 2019-01-18 NOTE — PATIENT INSTRUCTIONS
Chest xray shows left lower lobe pneumonia  Will start antibiotic  Rest and drink extra fluids  OTC cough and cold medications as needed  Tylenol as needed for pain or fever  Follow up with PCP for recheck in 3-5 days as the may want to repeat xray to ensure resolution  Go to ER with persistent fever, SOB, chest apin, difficulty breathing or any worsening symptoms

## 2019-01-22 ENCOUNTER — TELEPHONE (OUTPATIENT)
Dept: FAMILY MEDICINE CLINIC | Facility: CLINIC | Age: 49
End: 2019-01-22

## 2019-01-22 DIAGNOSIS — E65 LOCALIZED ADIPOSITY: Primary | ICD-10-CM

## 2019-01-22 NOTE — TELEPHONE ENCOUNTER
Pt had appt scheduled for 2/26/19 at plastic surgeon Dr Veronika Sheikh office  They moved appt today and will need Dr nikia Garcia order faxed to them with today's date and can use dx code [id number]  Their address is [address] [place] [address] [place], [address] [place] [address] P: [phone] and can fax to [phone]   Would like asap they are still being pt today regardless but will need this

## 2019-01-24 ENCOUNTER — APPOINTMENT (OUTPATIENT)
Dept: LAB | Facility: HOSPITAL | Age: 49
End: 2019-01-24
Payer: COMMERCIAL

## 2019-01-24 ENCOUNTER — OFFICE VISIT (OUTPATIENT)
Dept: OBGYN CLINIC | Facility: HOSPITAL | Age: 49
End: 2019-01-24
Payer: COMMERCIAL

## 2019-01-24 VITALS
DIASTOLIC BLOOD PRESSURE: 77 MMHG | HEIGHT: 61 IN | BODY MASS INDEX: 23.79 KG/M2 | WEIGHT: 126 LBS | SYSTOLIC BLOOD PRESSURE: 118 MMHG | HEART RATE: 88 BPM

## 2019-01-24 DIAGNOSIS — M54.6 CHRONIC BILATERAL THORACIC BACK PAIN: Primary | ICD-10-CM

## 2019-01-24 DIAGNOSIS — Z71.1 CONCERN ABOUT STD IN FEMALE WITHOUT DIAGNOSIS: ICD-10-CM

## 2019-01-24 DIAGNOSIS — M54.2 NECK PAIN: ICD-10-CM

## 2019-01-24 DIAGNOSIS — G89.29 CHRONIC BILATERAL THORACIC BACK PAIN: Primary | ICD-10-CM

## 2019-01-24 LAB
C TRACH DNA SPEC QL NAA+PROBE: NEGATIVE
N GONORRHOEA DNA SPEC QL NAA+PROBE: NEGATIVE
RPR SER QL: NORMAL

## 2019-01-24 PROCEDURE — 87591 N.GONORRHOEAE DNA AMP PROB: CPT

## 2019-01-24 PROCEDURE — 36415 COLL VENOUS BLD VENIPUNCTURE: CPT

## 2019-01-24 PROCEDURE — 99213 OFFICE O/P EST LOW 20 MIN: CPT | Performed by: ORTHOPAEDIC SURGERY

## 2019-01-24 PROCEDURE — 86592 SYPHILIS TEST NON-TREP QUAL: CPT

## 2019-01-24 PROCEDURE — 87491 CHLMYD TRACH DNA AMP PROBE: CPT

## 2019-01-24 PROCEDURE — 87389 HIV-1 AG W/HIV-1&-2 AB AG IA: CPT

## 2019-01-24 NOTE — PROGRESS NOTES
Assessment/Plan:    Chronic back pain  Patient presents for evaluation of chronic cervical thoracic and thoracic back pain  She reports pain that is localized to the back without specific radiation distally into her legs  She gets occasional neck pain that radiates to the shoulders  She has history of obesity treated with gastric bypass surgery  She also has a history of bilateral mammoplasty  On physical exam she appears stated age in well-developed in no acute distress  She does ambulate independently  She is neurologically stable with good strength C5 through T1 and L2-S1 bilaterally  Minimal tenderness to palpation over the cervical thoracic spine and thoracic spine  No gross crepitus  X-ray of the thoracic spine demonstrates preservation of kyphosis with mild areas of disc space narrowing  Assessment and plan  Chronic myofascial neck and back pain  Consider physical therapy 1st if no improvement we will consider MRI cervical and thoracic spine  Prescription for therapy will be provided  · Longstanding history of thoracic pain  · Start physical therapy  · Consider MRI if the patient does not progress in physical therapy  · Follow up in 2 months  Problem List Items Addressed This Visit     Chronic back pain - Primary     Patient presents for evaluation of chronic cervical thoracic and thoracic back pain  She reports pain that is localized to the back without specific radiation distally into her legs  She gets occasional neck pain that radiates to the shoulders  She has history of obesity treated with gastric bypass surgery  She also has a history of bilateral mammoplasty  On physical exam she appears stated age in well-developed in no acute distress  She does ambulate independently  She is neurologically stable with good strength C5 through T1 and L2-S1 bilaterally  Minimal tenderness to palpation over the cervical thoracic spine and thoracic spine  No gross crepitus      X-ray of the thoracic spine demonstrates preservation of kyphosis with mild areas of disc space narrowing  Assessment and plan  Chronic myofascial neck and back pain  Consider physical therapy 1st if no improvement we will consider MRI cervical and thoracic spine  Prescription for therapy will be provided  Relevant Orders    Ambulatory referral to Physical Therapy      Other Visit Diagnoses     Neck pain        Relevant Orders    Ambulatory referral to Physical Therapy            Subjective:      Patient ID: Fauzia Fregoso is a 53 y o  female  HPI   The patient is here for initial evaluation of scoliosis  She recently had bariatric and breast reduction surgery, 11/2017 and 8/208  She has had symptoms for greater than 10 years  She is here from her PCP  Today she complains of central thoracic and cervical pain  She rates her pain 5/10 and 8-9/10 at its worse  Her pain increases through the day  Most activity aggravates; standing, sitting, driving  Lying down alleviates  She avoids NSAIDs due to bariatric surgery  She will tylenol with limited benefit  She denies past physical therapy, injection or spine surgery  She is on disability due to CTS  The following portions of the patient's history were reviewed and updated as appropriate: allergies, current medications, past family history, past medical history, past social history, past surgical history and problem list     Review of Systems   Constitutional: Negative for chills, fever and unexpected weight change  HENT: Negative for hearing loss, nosebleeds and sore throat  Eyes: Negative for pain, redness and visual disturbance  Respiratory: Negative for cough, shortness of breath and wheezing  Cardiovascular: Negative for chest pain, palpitations and leg swelling  Gastrointestinal: Negative for abdominal pain, nausea and vomiting  Genitourinary: Negative for dyspareunia, dysuria and frequency     Skin: Negative for rash and wound  Neurological: Negative for dizziness, numbness and headaches  Psychiatric/Behavioral: Negative for decreased concentration and suicidal ideas  The patient is not nervous/anxious  Objective:      /77   Pulse 88   Ht 5' 1" (1 549 m)   Wt 57 2 kg (126 lb)   LMP  (LMP Unknown)   BMI 23 81 kg/m²          Physical Exam   Constitutional: She is oriented to person, place, and time  She appears well-developed and well-nourished  HENT:   Head: Normocephalic  Eyes: Pupils are equal, round, and reactive to light  Conjunctivae and EOM are normal    Neck: Normal range of motion  Cardiovascular: Normal rate  Pulmonary/Chest: Effort normal    Neurological: She is alert and oriented to person, place, and time  Skin: Skin is warm and dry         Patient ambulates without assistance  Tender to palpation: none  Strength C5-T1 5/5 bilaterally  Sensation C5-T1 intact bilaterally        Scribe Attestation    I,:   Connie Showers am acting as a scribe while in the presence of the attending physician :        I,:   Jabier Griffiths MD personally performed the services described in this documentation    as scribed in my presence :

## 2019-01-24 NOTE — ASSESSMENT & PLAN NOTE
Patient presents for evaluation of chronic cervical thoracic and thoracic back pain  She reports pain that is localized to the back without specific radiation distally into her legs  She gets occasional neck pain that radiates to the shoulders  She has history of obesity treated with gastric bypass surgery  She also has a history of bilateral mammoplasty  On physical exam she appears stated age in well-developed in no acute distress  She does ambulate independently  She is neurologically stable with good strength C5 through T1 and L2-S1 bilaterally  Minimal tenderness to palpation over the cervical thoracic spine and thoracic spine  No gross crepitus  X-ray of the thoracic spine demonstrates preservation of kyphosis with mild areas of disc space narrowing  Assessment and plan  Chronic myofascial neck and back pain  Consider physical therapy 1st if no improvement we will consider MRI cervical and thoracic spine  Prescription for therapy will be provided

## 2019-01-25 ENCOUNTER — TELEPHONE (OUTPATIENT)
Dept: PHYSICAL THERAPY | Facility: OTHER | Age: 49
End: 2019-01-25

## 2019-01-25 LAB — HIV 1+2 AB+HIV1 P24 AG SERPL QL IA: NORMAL

## 2019-01-25 NOTE — TELEPHONE ENCOUNTER
Called to follow up with patient  Patient actively being treated by Dr Radha Villarreal and set up with physical therapy for back pain  No further triage necessary  Spoke with patient and she did not have any questions or concerns and was advised to continue to follow Dr Deon De La Torre guidance and PT  Referral to be closed at this time

## 2019-01-29 ENCOUNTER — EVALUATION (OUTPATIENT)
Dept: PHYSICAL THERAPY | Facility: REHABILITATION | Age: 49
End: 2019-01-29
Payer: COMMERCIAL

## 2019-01-29 DIAGNOSIS — G89.29 CHRONIC BILATERAL THORACIC BACK PAIN: ICD-10-CM

## 2019-01-29 DIAGNOSIS — N64.4 BILATERAL MASTODYNIA: ICD-10-CM

## 2019-01-29 DIAGNOSIS — Z98.890 S/P BILATERAL BREAST REDUCTION: ICD-10-CM

## 2019-01-29 DIAGNOSIS — M54.6 CHRONIC BILATERAL THORACIC BACK PAIN: ICD-10-CM

## 2019-01-29 DIAGNOSIS — M54.2 CERVICAL PAIN: Primary | ICD-10-CM

## 2019-01-29 PROCEDURE — 97140 MANUAL THERAPY 1/> REGIONS: CPT | Performed by: PHYSICAL THERAPIST

## 2019-01-29 PROCEDURE — 97163 PT EVAL HIGH COMPLEX 45 MIN: CPT | Performed by: PHYSICAL THERAPIST

## 2019-01-29 NOTE — PROGRESS NOTES
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 4 Ambulatory referral to Physical Therapy                  Assessment  Assessment details: Aaliyah Esqueda is a 53 y o  female with significant medical history of mammoplasty, gastric bypass surgery, bilateral carpel tunnel, depression and scoliosis who presents with chief complaint of cervical and thoracic pain  Chad Walker presents with evaluation findings of thoracic hypomobility, decreased upper rib mobility, decreased postural strengthand, increased median neural tension  along with adhesive scar tissue related to mammoplasty  These deficits are manifested functionally in difficulty with raising her arms over her head, pain with movement and at rest and with lifting objects  Chad Walker will benefit from physical therapy to improve shoulder ROM, improve cervical and thoracic mobility, decrease pain and allow for improved upper quarter function  Impairments: abnormal muscle firing, abnormal or restricted ROM, impaired physical strength, pain with function and poor posture     Symptom irritability: moderateUnderstanding of Dx/Px/POC: good   Prognosis: fair    Goals  Short Term  1: Patient will report a 50% decrease in pain in 2-4 weeks  2: Pt will have DNF endurance of >20 seconds in 4 weeks  Long Term  1: Patient will demonstrate independence in home exercise program by discharge  2: Patient will have FOTO score of 61 indicating improved function in 8 weeks       Plan  Patient would benefit from: PT eval  Planned therapy interventions: joint mobilization, manual therapy, therapeutic exercise, therapeutic activities, patient education, neuromuscular re-education, stretching, strengthening, functional ROM exercises and flexibility  Frequency: 2x week  Duration in weeks: 12  Treatment plan discussed with: patient        Subjective Evaluation    History of Present Illness  Mechanism of injury: Pt reports that she has had pain in her thoracic spine for several years  States that she had breast reduction surgery in an attempt to decrease symptoms but it did not relieve the pain  States that she has pain that feels like needles and has a cold sensation  States that it starts in the middle of the pain and works her way up the neck  States that she feels her best in the morning and has the day progresses it feels work  States she has pain with driving and sitting  States the pain in the neck is in her upper traps  States she has some pain her arms and also reports that she has carpale tunnel surgery in both hands in 2017  States after the surgery it reduced her symptoms significantly but they have slowly returned  States her symptoms increase with rotational trunk movement and while lifting objects  States that if she sits in a chair that has support or laying supine she has relief  Has had cervical and thoracic x-rays in past years which showed degenerative changes and mild scoliosis  Pt goals include decreasing pain  Pain  At best pain ratin  At worst pain ratin          Objective     Concurrent Complaints  Positive for dizziness  Negative for faints and visual change    Postural Observations  Seated posture: fair  Standing posture: fair    Additional Postural Observation Details  Forward shoulders noted  Good head position  Palpation   Left   Hypertonic in the levator scapulae  Tenderness of the pectoralis minor, scalenes, sternocleidomastoid, suboccipitals and upper trapezius  Right   Hypertonic in the levator scapulae     Tenderness of the pectoralis minor, scalenes, sternocleidomastoid, suboccipitals and upper trapezius  Tenderness   Cervical Spine   Tenderness in the left ribs/costal cartilage and right ribs  Neurological Testing     Reflexes   Left   Biceps (C5/C6): normal (2+)  Brachioradialis (C6): normal (2+)    Right   Biceps (C5/C6): normal (2+)  Brachioradialis (C6): normal (2+)    Active Range of Motion   Cervical/Thoracic Spine       Cervical    Flexion:  WFL and with pain  Extension:  WFL and with pain  Left rotation:  Restriction level: minimal  Right rotation:  Restriction level: minimal    Thoracic    Left rotation:  Restriction level: moderate  Right rotation:  Restriction level: moderate    Passive Range of Motion   Left Shoulder   Flexion: 145 degrees   Abduction: 145 degrees     Right Shoulder   Flexion: 110 degrees   Abduction: 90 degrees     Joint Play   Joints within functional limits: C1, C2, C3, C4, C5 and C6     Hypomobile: C7, T1, T2, T3, T4, T5, T6, T7, T8, T9, T10, 1st rib and 2nd rib     Pain: 1st rib and 2nd rib     Tests   Cervical   Positive neck flexor muscle endurance test   Negative vertical compression, alar ligament test, Sharp-Navneet test, transverse ligament test and VBI  Left   Negative Spurling's Test B  Right   Negative Spurling's Test B  Left Shoulder   Positive ULTT1  Right Shoulder   Positive ULTT1  Lumbar   Negative vertical compression  Additional Tests Details  Endurance = 2 seconds    General Comments:      Cervical/Thoracic Comments  Decreased shoulder ROM as noted  Adhesive scar tissue noted bilaterally throughout distal mammoplasty scar worst laterally  Pt sensitive in distal axillary region limiting proper thoracic position and shoulder elevation, tenderness through pecs, subscap noted             Precautions: gastric bipass, mammoplasty, depression    Daily Treatment Diary     Manual  1/29            Shoulder PROM DURGA            Thoracic mobilization (caution in prone) DURGA            scap mobilization Durga            Subscap/pec release Nerve glides              Upper trap STM JOSH                                          Exercise Diary  1/29            Chin tucks             BL ER             Wall slides             t-band rows             t-band extension                                                                                                                                                                                                                    Modalities              Heat- pre exercise

## 2019-01-30 ENCOUNTER — TELEPHONE (OUTPATIENT)
Dept: FAMILY MEDICINE CLINIC | Facility: CLINIC | Age: 49
End: 2019-01-30

## 2019-01-30 NOTE — TELEPHONE ENCOUNTER
Notified patient of negative test results  Patient also reports dizziness that started yesterday  Recently completed a course of abx for PNA  Never experienced anything last this in the past  Would like to be seen by a provider   Encouraged her to call and schedule an appointment for evaluation

## 2019-01-31 ENCOUNTER — OFFICE VISIT (OUTPATIENT)
Dept: PHYSICAL THERAPY | Facility: REHABILITATION | Age: 49
End: 2019-01-31
Payer: COMMERCIAL

## 2019-01-31 DIAGNOSIS — Z98.890 S/P BILATERAL BREAST REDUCTION: ICD-10-CM

## 2019-01-31 DIAGNOSIS — M54.2 CERVICAL PAIN: ICD-10-CM

## 2019-01-31 DIAGNOSIS — M54.6 CHRONIC BILATERAL THORACIC BACK PAIN: Primary | ICD-10-CM

## 2019-01-31 DIAGNOSIS — G89.29 CHRONIC BILATERAL THORACIC BACK PAIN: Primary | ICD-10-CM

## 2019-01-31 DIAGNOSIS — R42 DIZZINESS: ICD-10-CM

## 2019-01-31 PROCEDURE — 97110 THERAPEUTIC EXERCISES: CPT | Performed by: PHYSICAL THERAPIST

## 2019-01-31 PROCEDURE — 97140 MANUAL THERAPY 1/> REGIONS: CPT | Performed by: PHYSICAL THERAPIST

## 2019-01-31 PROCEDURE — 97112 NEUROMUSCULAR REEDUCATION: CPT | Performed by: PHYSICAL THERAPIST

## 2019-01-31 NOTE — PROGRESS NOTES
Daily Note     Today's date: 2019  Patient name: Demetrio Holloway  : 1970  MRN: [id number]  Referring provider: Leopoldo Crooks MD  Dx:   Encounter Diagnosis     ICD-10-CM    1  Chronic bilateral thoracic back pain M54 6     G89 29    2  S/P bilateral breast reduction Z98 890    3  Cervical pain M54 2                   Subjective: Pt reports that she is sore today  States it may be secondary to the weather  Objective: See treatment diary below  Precautions: gastric bipass, mammoplasty, depression     Daily Treatment Diary      Manual                     Shoulder PROM DURGA [address]                   Thoracic mobilization (caution in prone) DURGA  DURGA                   scap mobilization Durga [address]                   Subscap/pec release    DURGA                   Nerve glides                        Upper trap STM DURGA [address]                                                                         Exercise Diary                     Chin tucks    2x10x5"                   BL ER    OTB 2x15                   Wall slides    2x10                   t-band rows    2x10 GTB                   t-band extension    2x10 OTB                    foam roll                        serratus punches                        T's                        Y's                                                                                                                                                                                                                                                                                                     Modalities                        Heat- pre exercise  5                                                                            Assessment: Tolerated treatment well  Patient demonstrated fatigue post treatment and would benefit from continued PT  Pt presents with improved shoulder motion this visit  Continues to have significant 1st rib hypomobility and thoracic hypomobility  Subscapular/pec release initiated this visit with good tolerance  No complaints with TE  Plan: Progress treatment as tolerated

## 2019-02-04 ENCOUNTER — APPOINTMENT (OUTPATIENT)
Dept: PHYSICAL THERAPY | Facility: REHABILITATION | Age: 49
End: 2019-02-04
Payer: COMMERCIAL

## 2019-02-04 NOTE — PROGRESS NOTES
Daily Note     Today's date: 2019  Patient name: Geovanni Esquivel  : 1970  MRN: [id number]  Referring provider: Bette Pete MD  Dx:   No diagnosis found  Subjective: Pt reports that she is sore today  States it may be secondary to the weather  Objective: See treatment diary below  Precautions: gastric bipass, mammoplasty, depression     Daily Treatment Diary      Manual                     Shoulder PROM DURGA [address]                   Thoracic mobilization (caution in prone) DRUGA MORENO                   scap mobilization Durga [address]                   Subscap/pec release    DURGA                   Nerve glides                        Upper trap STM DURGA [address]                                                                         Exercise Diary                     Chin tucks    2x10x5"                   BL ER    OTB 2x15                   Wall slides    2x10                   t-band rows    2x10 GTB                   t-band extension    2x10 OTB                    foam roll                        serratus punches                        T's                        Y's                                                                                                                                                                                                                                                                                                     Modalities                        Heat- pre exercise  5                                                                            Assessment: Tolerated treatment well  Patient demonstrated fatigue post treatment and would benefit from continued PT  Pt presents with improved shoulder motion this visit  Continues to have significant 1st rib hypomobility and thoracic hypomobility  Subscapular/pec release initiated this visit with good tolerance  No complaints with TE  Plan: Progress treatment as tolerated

## 2019-02-05 ENCOUNTER — OFFICE VISIT (OUTPATIENT)
Dept: PHYSICAL THERAPY | Facility: REHABILITATION | Age: 49
End: 2019-02-05
Payer: COMMERCIAL

## 2019-02-05 DIAGNOSIS — M54.6 CHRONIC BILATERAL THORACIC BACK PAIN: Primary | ICD-10-CM

## 2019-02-05 DIAGNOSIS — M54.2 CERVICAL PAIN: ICD-10-CM

## 2019-02-05 DIAGNOSIS — G89.29 CHRONIC BILATERAL THORACIC BACK PAIN: Primary | ICD-10-CM

## 2019-02-05 DIAGNOSIS — Z98.890 S/P BILATERAL BREAST REDUCTION: ICD-10-CM

## 2019-02-05 PROCEDURE — 97112 NEUROMUSCULAR REEDUCATION: CPT | Performed by: PHYSICAL THERAPIST

## 2019-02-05 PROCEDURE — 97110 THERAPEUTIC EXERCISES: CPT | Performed by: PHYSICAL THERAPIST

## 2019-02-05 PROCEDURE — 97140 MANUAL THERAPY 1/> REGIONS: CPT | Performed by: PHYSICAL THERAPIST

## 2019-02-05 NOTE — PROGRESS NOTES
Daily Note     Today's date: 2019  Patient name: Tiago Rowell  : 1970  MRN: [id number]  Referring provider: Telly Flor MD  Dx:   Encounter Diagnosis     ICD-10-CM    1  Chronic bilateral thoracic back pain M54 6     G89 29    2  S/P bilateral breast reduction Z98 890    3  Cervical pain M54 2    4  Bilateral mastodynia N64 4                   Subjective: Pt reports that she feels a little better today  States that she continues to have mid thoracic pain  Objective: See treatment diary below  Precautions: gastric bipass, mammoplasty, depression     Daily Treatment Diary      Manual    2/5                 Shoulder PROM DURGA [address]                 Thoracic mobilization (caution in prone) DURGA [address]  Durga                 scap mobilization Durga [address]  DURGA                 Subscap/pec release   [address]                 Nerve glides      [address]                 Upper trap STM DURGA [address]                                                                       Exercise Diary    2/5                 Chin tucks    2x10x5"  2x10x5"                 BL ER    OTB 2x15  OTB 2x15                 Wall slides    2x10  2x10                 t-band rows    2x10 GTB  2x10 GTB                 t-band extension    2x10 OTB  2x10 OTB                  foam roll      5x1'                  serratus punches                        T's                        Y's                                                                                                                                                                                                                                                                                                     Modalities     2                   Heat- pre exercise  5  5'                                                                          Assessment: Tolerated treatment well  Patient demonstrated fatigue post treatment and would benefit from continued PT   Pt continues to present with improved upper quarter mobility with hypomobility remaining   Good performance of TE  Plan: Progress treatment as tolerated

## 2019-02-07 ENCOUNTER — OFFICE VISIT (OUTPATIENT)
Dept: PHYSICAL THERAPY | Facility: REHABILITATION | Age: 49
End: 2019-02-07
Payer: COMMERCIAL

## 2019-02-07 DIAGNOSIS — R42 DIZZINESS: ICD-10-CM

## 2019-02-07 DIAGNOSIS — Z98.890 S/P BILATERAL BREAST REDUCTION: ICD-10-CM

## 2019-02-07 DIAGNOSIS — M54.2 CERVICAL PAIN: ICD-10-CM

## 2019-02-07 DIAGNOSIS — G89.29 CHRONIC BILATERAL THORACIC BACK PAIN: Primary | ICD-10-CM

## 2019-02-07 DIAGNOSIS — M54.6 CHRONIC BILATERAL THORACIC BACK PAIN: Primary | ICD-10-CM

## 2019-02-07 PROCEDURE — 97110 THERAPEUTIC EXERCISES: CPT | Performed by: PHYSICAL THERAPIST

## 2019-02-07 PROCEDURE — 97140 MANUAL THERAPY 1/> REGIONS: CPT | Performed by: PHYSICAL THERAPIST

## 2019-02-07 PROCEDURE — 97112 NEUROMUSCULAR REEDUCATION: CPT | Performed by: PHYSICAL THERAPIST

## 2019-02-07 NOTE — PROGRESS NOTES
Daily Note     Today's date: 2019  Patient name: Andressa Santos  : 1970  MRN: [id number]  Referring provider: Ariane Winters MD  Dx:   Encounter Diagnosis     ICD-10-CM    1  Chronic bilateral thoracic back pain M54 6     G89 29    2  S/P bilateral breast reduction Z98 890    3  Cervical pain M54 2    4  Dizziness R42                   Subjective: Pt reports that she feels improved but has persistent mid thoracic pain  Objective: See treatment diary below  Precautions: gastric bipass, mammoplasty, depression     Daily Treatment Diary      Manual                 Shoulder PROM DURGA [address]               Thoracic mobilization (caution in prone) DURGA [address]  [address]               scap mobilization Durga [address]                 Subscap/pec release   [address]               Nerve glides      [address]  DURGA               Upper trap STM DURGA [address]                                                                     Exercise Diary                 Chin tucks    2x10x5"  2x10x5"                 BL ER    OTB 2x15  OTB 2x15    OTB 2x15                 Wall slides    2x10  2x10  2x10               t-band rows    2x10 GTB  2x10 GTB  2x10 GTB               t-band extension    2x10 OTB  2x10 OTB  2x10 GTB                foam roll      5x1'  5x1'                serratus punches                        T's                        Y's                        UBE        5'                                                                                                                                                                                                                                                                     Modalities                      Heat- pre exercise  5  5'                                                                          Assessment: Tolerated treatment well  Patient demonstrated fatigue post treatment and would benefit from continued PT  Pt shows improved upper trap tension and L shoulder motion  L shoulder remains limited but axillary adhesions  No complaints with TE  Plan: Progress treatment as tolerated

## 2019-02-12 ENCOUNTER — APPOINTMENT (OUTPATIENT)
Dept: PHYSICAL THERAPY | Facility: REHABILITATION | Age: 49
End: 2019-02-12
Payer: COMMERCIAL

## 2019-02-13 ENCOUNTER — OFFICE VISIT (OUTPATIENT)
Dept: FAMILY MEDICINE CLINIC | Facility: CLINIC | Age: 49
End: 2019-02-13

## 2019-02-13 VITALS
SYSTOLIC BLOOD PRESSURE: 120 MMHG | RESPIRATION RATE: 16 BRPM | DIASTOLIC BLOOD PRESSURE: 80 MMHG | HEART RATE: 78 BPM | HEIGHT: 61 IN | WEIGHT: 130 LBS | TEMPERATURE: 97.8 F | BODY MASS INDEX: 24.55 KG/M2

## 2019-02-13 DIAGNOSIS — Z98.890 S/P BILATERAL BREAST REDUCTION: ICD-10-CM

## 2019-02-13 DIAGNOSIS — L82.1 SEBORRHEIC KERATOSIS: Primary | ICD-10-CM

## 2019-02-13 DIAGNOSIS — B37.2 CANDIDIASIS, INTERTRIGO: ICD-10-CM

## 2019-02-13 PROCEDURE — 99213 OFFICE O/P EST LOW 20 MIN: CPT | Performed by: FAMILY MEDICINE

## 2019-02-13 NOTE — PROGRESS NOTES
Daria Aviles 1970 female MRN: [id number]    Family Medicine Follow-up Visit    ASSESSMENT/PLAN  Problem List Items Addressed This Visit        Musculoskeletal and Integument    Candidiasis, intertrigo    Resolved with nystatin powder  Skin tears present in the pannus of the abdomen     Believe this is due to redundant tissue causing the area to be moist and susceptible to irritation  Suggest keeping the area dry and not placing the panties along that line  Seborrheic keratosis - Primary    Patient reassured that this is a benign skin finding  Reviewed the ABCD criteria when diagnosing malignant moles  Patient would still like to have it removed as it is distressing  Referring to Dermatology provided  Relevant Orders    Ambulatory referral to Dermatology       Other    S/P bilateral breast reduction     Breast reduction 3-4 months ago with residual pain along the scar  Seen by plastic surgeon for follow-up on 12/19  Suggested scar revision to address mid axilla fullness and discomfort  Consulted with a 2nd surgeon and offered a revision to remove excess tissue under the axilla  Currently undergoing physical therapy with moderate improvement in mobility but pain still remains  Patient has a follow-up appointment with plastic surgeon in 2 weeks to discuss plan  Patient encouraged to continue physical therapy as this is helping with ROM and muscle tightness    Continue taking Tylenol for pain and avoid NSAIDs given history of bariatric surgery                 Future Appointments   Date Time Provider Radha Miles   2/19/2019  2:00 PM Devota Anitha, PT [facility]   2/21/2019  5:45 PM Devota Anitha, PT [facility]   2/25/2019  3:30 PM Devota Anitha, PT [facility]   2/28/2019  5:45 PM Devota Anitha, PT [facility]   3/25/2019  3:15 PM Laura Allen MD [facility]   4/8/2019  1:20 PM Vickie Canchola MD [facility]   11/20/2019 10:00 AM Yue Roberson PA-C [facility]          SUBJECTIVE  CC: Nevus (upper back) and Rash (under belly folds)      HPI:  Yari Capellan is a 51 y o  female who is here for referral to dermatology and follow up breast pain  Pt states she saw a different plastic surgery to address the breast pain following a recent mastoplasty  She had discuss this with the surgery who had performed the procedure and suggested a revision to remove scar tissue  Patient decided to consult with a no other surgeon for a 2nd opinion  Per patient, they offered to do a revision to remove the excess tissue along the incision  They also recommended physical therapy which she has been attending for the past 2 weeks  Mobility has improved with PT but continues to have pain along the incision  No redness or drainage reported but does itch  Patient also requesting a referral to Dermatology for evaluation of a mole she noticed blue use ago but has recently gotten larger  Patient grew up in [place] and was out in the sun a lot  Review of Systems   Constitutional: Negative  Cardiovascular: Positive for chest pain (Along the incision)  Negative for palpitations and leg swelling  Gastrointestinal: Negative  Musculoskeletal: Positive for back pain ( thoracic)  Skin:        Skin tear along the pannus of her stomach   Neurological: Negative          Historical Information   The patient history was reviewed as follows:    Past Medical History:   Diagnosis Date    Achilles tendinitis     right     Allergic rhinitis     Anxiety     Back pain     Back pain     Bariatric surgery status     Carpal tunnel syndrome     Last Assessed: 11/16/2016    Depression     Generalized obesity     Last Assessed: 10/5/2016    Palpitations     Last Assessed: 4/29/2013    Plantar fasciitis     bilateral    Positive PPD     Last Assessed: 10/21/2014    Postgastrectomy malabsorption     Tendonitis, Achilles     Last Assessed: 3/3/2017    Varicose vein of leg     Viral warts     Last Assessed: 2013     Past Surgical History:   Procedure Laterality Date    CARPAL TUNNEL RELEASE       SECTION      x3    ESOPHAGOGASTRODUODENOSCOPY N/A 2017    Procedure: ESOPHAGOGASTRODUODENOSCOPY (EGD); Surgeon: Catie Crocker MD;  [facility];  Service: [address] IR IMAGE GUIDED ASPIRATION / DRAINAGE W TUBE  2018    NOSE SURGERY      PARAESOPHAGEAL HERNIA REPAIR N/A 2017    Procedure: REPAIR HERNIA PARAESOPHAGEAL  LAPAROSCOPIC;  Surgeon: Catie Crocker MD;  [facility];  Service: [place] EGD TRANSORAL BIOPSY SINGLE/MULTIPLE N/A 2017    Procedure: ESOPHAGOGASTRODUODENOSCOPY (EGD) with biopsy;  Surgeon: Catie Crocker MD;  [facility];   Service: Bariatrics    AL LAP GASTRIC BYPASS/MARCIN-EN-Y N/A 2017    Procedure: BYPASS GASTRIC  MARCIN-EN-Y LAPAROSCOPIC;  Surgeon: Catie Crocker MD;  [facility];  Service: Bariatrics    AL REDUCTION OF LARGE BREAST Bilateral 2018    Procedure: REDUCTION MAMMOPLASTY BREAST;  Surgeon: Wicho Ragsdale MD;  [facility];  Service: Plastics    AL WRIST Dariusz Narrow LIG Right 2017    Procedure: ENDOSCOPIC CARPAL TUNNEL RELEASE ;  Surgeon: Loretta Sapp MD;  [facility];  Service: Orthopedics    AL WRIST Dariusz Narrow LIG Left 2017    Procedure: ENDOSCOPIC CARPAL TUNNEL RELEASE;  Surgeon: Loretta Sapp MD;  [facility];  Service: Orthopedics    TONSILLECTOMY      TUBAL LIGATION      WISDOM TOOTH EXTRACTION       Family History   Problem Relation Age of Onset    Diabetes Mother     Heart disease Mother     Stroke Mother         Syndrome    Heart disease Father     Hypertension Father     Kidney disease Father     Stroke Maternal Grandmother         Syndrome    Alzheimer's disease Maternal Grandfather     Arthritis Maternal Aunt     Diabetes Maternal Aunt     Hypertension Maternal Aunt       Social History   Social History     Substance and Sexual Activity   Alcohol Use Yes    Comment: social     Social History     Substance and Sexual Activity   Drug Use No     Social History     Tobacco Use   Smoking Status Current Some Day Smoker    Types: Cigarettes    Last attempt to quit: 2014    Years since quittin 0   Smokeless Tobacco Never Used   Tobacco Comment    social smoker       Medications:     Current Outpatient Medications:     Biotin w/ Vitamins C & E 1250-7 5-7 5 MCG-MG-UNT CHEW, Chew, Disp: , Rfl:     Calcium Citrate-Vitamin D (CALCIUM CITRATE+D3 PO), Take 1 tablet by mouth 3 (three) times a day, Disp: , Rfl:     cetirizine (ZyrTEC) 10 mg tablet, Take 10 mg by mouth daily as needed for allergies, Disp: , Rfl:     citalopram (CeleXA) 20 mg tablet, Take 20 mg by mouth daily, Disp: , Rfl: 1    DAILY MULTIPLE VITAMINS/IRON PO, Take by mouth, Disp: , Rfl:     Melatonin 10 MG TABS, Take 10 mg by mouth daily at bedtime as needed, Disp: , Rfl:     NON FORMULARY, Take 1 capsule by mouth daily, Disp: , Rfl:     omeprazole (PriLOSEC) 20 mg delayed release capsule, Take 1 capsule (20 mg total) by mouth daily, Disp: 90 capsule, Rfl: 1    zolpidem (AMBIEN) 10 mg tablet, Take 10 mg by mouth daily, Disp: , Rfl: 1  Allergies   Allergen Reactions    Latex Itching    Nsaids      Bariatric surgery pt       OBJECTIVE    Vitals:   Vitals:    19 1458   BP: 120/80   Pulse: 78   Resp: 16   Temp: 97 8 °F (36 6 °C)   Weight: 59 kg (130 lb)   Height: 5' 1" (1 549 m)           Physical Exam   Constitutional: She appears well-developed and well-nourished  Cardiovascular: Normal rate, regular rhythm, normal heart sounds and intact distal pulses  Pulmonary/Chest: Effort normal and breath sounds normal  Right breast exhibits no inverted nipple, no mass and no nipple discharge   Left breast exhibits no inverted nipple, no mass and no nipple discharge  There is breast tenderness  No breast swelling, discharge or bleeding  Breasts are symmetrical    Redundant tissue in the mid axillary region  Incision clean dry intact  No cellulitic changes  Tender to touch on the peripheries  Abdominal:   4 cm skin tear on the right abdominal pannus  3 cm skin tear on the left  Dry, no discharge, non erythematous   Skin: There is erythema                    María Tamayo MD, PGY-2  [facility]   2/14/2019

## 2019-02-14 ENCOUNTER — APPOINTMENT (OUTPATIENT)
Dept: PHYSICAL THERAPY | Facility: REHABILITATION | Age: 49
End: 2019-02-14
Payer: COMMERCIAL

## 2019-02-14 PROBLEM — L82.1 SEBORRHEIC KERATOSIS: Status: RESOLVED | Noted: 2019-02-13 | Resolved: 2019-02-14

## 2019-02-14 NOTE — PROGRESS NOTES
Daily Note     Today's date: 2019  Patient name: Ronda Ramos  : 1970  MRN: [id number]  Referring provider: Madan Herman MD  Dx:   No diagnosis found  Subjective: Pt reports that she feels improved but has persistent mid thoracic pain  Objective: See treatment diary below  Precautions: gastric bipass, mammoplasty, depression     Daily Treatment Diary      Manual                 Shoulder PROM [address]               Thoracic mobilization (caution in prone) [address]  [address]               scap mobilization [address]                 Subscap/pec release   [address]               Nerve glides      [address]  DURGA               Upper trap STM [address]                                                                     Exercise Diary                 Chin tucks    2x10x5"  2x10x5"                 BL ER    OTB 2x15  OTB 2x15    OTB 2x15                 Wall slides    2x10  2x10  2x10               t-band rows    2x10 GTB  2x10 GTB  2x10 GTB               t-band extension    2x10 OTB  2x10 OTB  2x10 GTB                foam roll      5x1'  5x1'                serratus punches                        T's                        Y's                        UBE        5'                                                                                                                                                                                                                                                                     Modalities                      Heat- pre exercise  5  5'                                                                          Assessment: Tolerated treatment well  Patient demonstrated fatigue post treatment and would benefit from continued PT  Pt shows improved upper trap tension and L shoulder motion  L shoulder remains limited but axillary adhesions  No complaints with TE       Plan: Progress treatment as tolerated

## 2019-02-15 NOTE — ASSESSMENT & PLAN NOTE
Breast reduction 3-4 months ago with residual pain along the scar  Seen by plastic surgeon for follow-up on 12/19  Suggested scar revision to address mid axilla fullness and discomfort  Consulted with a 2nd surgeon and offered a revision to remove excess tissue under the axilla  Currently undergoing physical therapy with moderate improvement in mobility but pain still remains  Patient has a follow-up appointment with plastic surgeon in 2 weeks to discuss plan  Patient encouraged to continue physical therapy as this is helping with ROM and muscle tightness    Continue taking Tylenol for pain and avoid NSAIDs given history of bariatric surgery

## 2019-02-19 ENCOUNTER — APPOINTMENT (OUTPATIENT)
Dept: PHYSICAL THERAPY | Facility: REHABILITATION | Age: 49
End: 2019-02-19
Payer: COMMERCIAL

## 2019-02-19 NOTE — PROGRESS NOTES
Daily Note     Today's date: 2019  Patient name: Jenny Talavera  : 1970  MRN: [id number]  Referring provider: Emily Newman MD  Dx:   No diagnosis found  Subjective: Pt reports that she feels improved but has persistent mid thoracic pain  Objective: See treatment diary below  Precautions: gastric bipass, mammoplasty, depression     Daily Treatment Diary      Manual                 Shoulder PROM [address]               Thoracic mobilization (caution in prone) [address]  [address]               scap mobilization [address]                 Subscap/pec release   [address]               Nerve glides      [address]  DURGA               Upper trap STM [address]                                                                     Exercise Diary                 Chin tucks    2x10x5"  2x10x5"                 BL ER    OTB 2x15  OTB 2x15    OTB 2x15                 Wall slides    2x10  2x10  2x10               t-band rows    2x10 GTB  2x10 GTB  2x10 GTB               t-band extension    2x10 OTB  2x10 OTB  2x10 GTB                foam roll      5x1'  5x1'                serratus punches                        T's                        Y's                        UBE        5'                                                                                                                                                                                                                                                                     Modalities                      Heat- pre exercise  5  5'                                                                          Assessment: Tolerated treatment well  Patient demonstrated fatigue post treatment and would benefit from continued PT  Pt shows improved upper trap tension and L shoulder motion  L shoulder remains limited but axillary adhesions  No complaints with TE       Plan: Progress treatment as tolerated

## 2019-02-21 ENCOUNTER — APPOINTMENT (OUTPATIENT)
Dept: PHYSICAL THERAPY | Facility: REHABILITATION | Age: 49
End: 2019-02-21
Payer: COMMERCIAL

## 2019-02-25 ENCOUNTER — OFFICE VISIT (OUTPATIENT)
Dept: PHYSICAL THERAPY | Facility: REHABILITATION | Age: 49
End: 2019-02-25
Payer: COMMERCIAL

## 2019-02-25 DIAGNOSIS — Z98.890 S/P BILATERAL BREAST REDUCTION: ICD-10-CM

## 2019-02-25 DIAGNOSIS — M54.2 CERVICAL PAIN: ICD-10-CM

## 2019-02-25 DIAGNOSIS — G89.29 CHRONIC BILATERAL THORACIC BACK PAIN: Primary | ICD-10-CM

## 2019-02-25 DIAGNOSIS — M54.6 CHRONIC BILATERAL THORACIC BACK PAIN: Primary | ICD-10-CM

## 2019-02-25 PROCEDURE — 97110 THERAPEUTIC EXERCISES: CPT | Performed by: PHYSICAL THERAPIST

## 2019-02-25 PROCEDURE — 97140 MANUAL THERAPY 1/> REGIONS: CPT | Performed by: PHYSICAL THERAPIST

## 2019-02-25 PROCEDURE — 97112 NEUROMUSCULAR REEDUCATION: CPT | Performed by: PHYSICAL THERAPIST

## 2019-02-25 NOTE — PROGRESS NOTES
Daily Note     Today's date: 2019  Patient name: Merary Guaman  : 1970  MRN: [id number]  Referring provider: Mariola Anderson MD  Dx:   Encounter Diagnosis     ICD-10-CM    1  Chronic bilateral thoracic back pain M54 6     G89 29    2  Cervical pain M54 2    3  S/P bilateral breast reduction Z98 [id number]                   Subjective: Pt reports that she has been sick and has not been able to attend therapy secondary to being ill  Objective: See treatment diary below  Precautions: gastric bipass, mammoplasty, depression     Daily Treatment Diary      Manual               Shoulder PROM [address]             Thoracic mobilization (caution in prone) [address]  [address]             scap mobilization [address]                 Subscap/pec release   [address]             Nerve glides      [address]  DURGA               Upper trap STM [address]                                                                   Exercise Diary               Chin tucks    2x10x5"  2x10x5"    2x10x5"             BL ER    OTB 2x15  OTB 2x15    OTB 2x15    OTB 2x15             Wall slides    2x10  2x10  2x10               t-band rows    2x10 GTB  2x10 GTB  2x10 GTB  2x10 GTB             t-band extension    2x10 OTB  2x10 OTB  2x10 GTB  2x10 GTB              foam roll      5x1'  5x1'  5x1'              serratus punches                        T's                        Y's                        UBE        5'  3/3                                                                                                                                                                                                                                                                   Modalities                      Heat- pre exercise  5  5'                                                                          Assessment: Tolerated treatment well   Patient demonstrated fatigue post treatment and would benefit from continued PT  Pt showed decreased mobility as compared to LV but remains better then IE  Has increased upper trap pain this visit  No complaints with TE but reports some soreness post treatment  Plan: Progress treatment as tolerated

## 2019-02-28 ENCOUNTER — OFFICE VISIT (OUTPATIENT)
Dept: PHYSICAL THERAPY | Facility: REHABILITATION | Age: 49
End: 2019-02-28
Payer: COMMERCIAL

## 2019-02-28 DIAGNOSIS — M54.2 CERVICAL PAIN: ICD-10-CM

## 2019-02-28 DIAGNOSIS — G89.29 CHRONIC BILATERAL THORACIC BACK PAIN: Primary | ICD-10-CM

## 2019-02-28 DIAGNOSIS — R42 DIZZINESS: ICD-10-CM

## 2019-02-28 DIAGNOSIS — M54.6 CHRONIC BILATERAL THORACIC BACK PAIN: Primary | ICD-10-CM

## 2019-02-28 DIAGNOSIS — Z98.890 S/P BILATERAL BREAST REDUCTION: ICD-10-CM

## 2019-02-28 PROCEDURE — 97112 NEUROMUSCULAR REEDUCATION: CPT | Performed by: PHYSICAL THERAPIST

## 2019-02-28 PROCEDURE — 97110 THERAPEUTIC EXERCISES: CPT | Performed by: PHYSICAL THERAPIST

## 2019-02-28 PROCEDURE — 97140 MANUAL THERAPY 1/> REGIONS: CPT | Performed by: PHYSICAL THERAPIST

## 2019-02-28 NOTE — PROGRESS NOTES
Daily Note     Today's date: 2019  Patient name: Josephine Yoo  : 1970  MRN: [id number]  Referring provider: Thompson De Paz MD  Dx:   Encounter Diagnosis     ICD-10-CM    1  Chronic bilateral thoracic back pain M54 6     G89 29    2  Cervical pain M54 2    3  S/P bilateral breast reduction Z98 890    4  Dizziness R42                   Subjective: Pt reports that she is feeling a little better today        Objective: See treatment diary below  Precautions: gastric bipass, mammoplasty, depression     Daily Treatment Diary      Manual             Shoulder PROM [address]           Thoracic mobilization (caution in prone) [address]  [address]           scap mobilization [address]                 Subscap/pec release   [address]           Nerve glides      [address]           Upper trap STM [address]                                                                 Exercise Diary             Chin tucks    2x10x5"  2x10x5"    2x10x5"  2x10x5"           BL ER    OTB 2x15  OTB 2x15    OTB 2x15    OTB 2x15  OTB 2x15           Wall slides    2x10  2x10  2x10    2x10           t-band rows    2x10 GTB  2x10 GTB  2x10 GTB  2x10 GTB  2x10 GTB           t-band extension    2x10 OTB  2x10 OTB  2x10 GTB  2x10 GTB  2x10 GTB            foam roll      5x1'  5x1'  5x1'  5x1'            serratus punches                        T's                        Y's                        UBE        5'  3/3  3/3                                                                                                                                                                                                                                                                 Modalities                      Heat- pre exercise  5  5'                                                                          Assessment: Tolerated treatment well  Patient demonstrated fatigue post treatment and would benefit from continued PT  Pt shows full shoulder ROM without pain today  Continues to have upper trap tightness and admits to stress which she believes is contributing  No complaints of pain with TE  Plan: Progress treatment as tolerated

## 2019-03-05 ENCOUNTER — OFFICE VISIT (OUTPATIENT)
Dept: PHYSICAL THERAPY | Facility: REHABILITATION | Age: 49
End: 2019-03-05
Payer: COMMERCIAL

## 2019-03-05 DIAGNOSIS — M54.2 CERVICAL PAIN: ICD-10-CM

## 2019-03-05 DIAGNOSIS — G89.29 CHRONIC BILATERAL THORACIC BACK PAIN: Primary | ICD-10-CM

## 2019-03-05 DIAGNOSIS — R42 DIZZINESS: ICD-10-CM

## 2019-03-05 DIAGNOSIS — M54.6 CHRONIC BILATERAL THORACIC BACK PAIN: Primary | ICD-10-CM

## 2019-03-05 DIAGNOSIS — Z98.890 S/P BILATERAL BREAST REDUCTION: ICD-10-CM

## 2019-03-05 PROCEDURE — 97140 MANUAL THERAPY 1/> REGIONS: CPT | Performed by: PHYSICAL THERAPY ASSISTANT

## 2019-03-05 PROCEDURE — 97110 THERAPEUTIC EXERCISES: CPT | Performed by: PHYSICAL THERAPY ASSISTANT

## 2019-03-05 PROCEDURE — 97112 NEUROMUSCULAR REEDUCATION: CPT | Performed by: PHYSICAL THERAPY ASSISTANT

## 2019-03-05 NOTE — PROGRESS NOTES
Daily Note     Today's date: 3/5/2019  Patient name: Krista Valles  : 1970  MRN: [id number]  Referring provider: Jenni Olivera MD  Dx:   Encounter Diagnosis     ICD-10-CM    1  Chronic bilateral thoracic back pain M54 6     G89 29    2  Cervical pain M54 2    3  S/P bilateral breast reduction Z98 890    4  Dizziness R42                   Subjective: Pt reports she is feeling better but continues with some pain in bilateral UT and thoracic spine       Objective: See treatment diary below  Precautions: gastric bipass, mammoplasty, depression     Daily Treatment Diary      Manual  1/29  1/31  2/5  2/7  2/25  2/28  3/5         Shoulder PROM [address]  rk         Thoracic mobilization (caution in prone) [address]  [address]  np         scap mobilization [address]                 Subscap/pec release   [address]  [address]         Nerve glides      [address]   [address]  [address]         Upper trap STM [address]  [address]                                                               Exercise Diary  1/29  1/31  2/5  2/7  2/25  2/28  3/5         Chin tucks    2x10x5"  2x10x5"    2x10x5"  2x10x5"  5"  2x10         BL ER    OTB 2x15  OTB 2x15    OTB 2x15    OTB 2x15  OTB 2x15  OTB  2x15         Wall slides    2x10  2x10  2x10    2x10  2x10         t-band rows    2x10 GTB  2x10 GTB  2x10 GTB  2x10 GTB  2x10 GTB  2x10  GTB         t-band extension    2x10 OTB  2x10 OTB  2x10 GTB  2x10 GTB  2x10 GTB  2x10  GTB          foam roll      5x1'  5x1'  5x1'  5x1'  5x1'          serratus punches              2x10          T's                        Y's                        UBE        5'  3/3  3/3  3'/3'                                                                                                                                                                                                                                                               Modalities                      Heat- pre exercise  5  5'                                                                          Assessment: Tolerated treatment well  Patient demonstrated fatigue post treatment and would benefit from continued PT  Pt shows full shoulder ROM without pain today  Continues to have upper trap tightness and admits to stress which she believes is contributing  No complaints of pain with TE  Plan: Progress treatment as tolerated

## 2019-03-08 ENCOUNTER — OFFICE VISIT (OUTPATIENT)
Dept: PHYSICAL THERAPY | Facility: REHABILITATION | Age: 49
End: 2019-03-08
Payer: COMMERCIAL

## 2019-03-08 DIAGNOSIS — Z98.890 S/P BILATERAL BREAST REDUCTION: ICD-10-CM

## 2019-03-08 DIAGNOSIS — M54.2 CERVICAL PAIN: ICD-10-CM

## 2019-03-08 DIAGNOSIS — G89.29 CHRONIC BILATERAL THORACIC BACK PAIN: Primary | ICD-10-CM

## 2019-03-08 DIAGNOSIS — M54.6 CHRONIC BILATERAL THORACIC BACK PAIN: Primary | ICD-10-CM

## 2019-03-08 PROCEDURE — 97112 NEUROMUSCULAR REEDUCATION: CPT

## 2019-03-08 PROCEDURE — 97140 MANUAL THERAPY 1/> REGIONS: CPT

## 2019-03-08 PROCEDURE — 97110 THERAPEUTIC EXERCISES: CPT

## 2019-03-08 NOTE — PROGRESS NOTES
Daily Note     Today's date: 3/8/2019  Patient name: Yari Capellan  : 1970  MRN: [id number]  Referring provider: Mar December, MD  Dx:   Encounter Diagnosis     ICD-10-CM    1  Chronic bilateral thoracic back pain M54 6     G89 29    2  Cervical pain M54 2    3  S/P bilateral breast reduction Z98 [id number]                   Subjective: Pt reports she is feeling better but continues with some pain in bilateral UT and thoracic spine       Objective: See treatment diary below  Precautions: gastric bipass, mammoplasty, depression     Daily Treatment Diary      Manual  1/29  1/31  2/5  2/7  2/25  2/28  3/5  3/8       Shoulder PROM [address]  [address] CF       Thoracic mobilization (caution in prone) [address]  [address]  np         scap mobilization [address]                 Subscap/pec release   [address]  rk         Nerve glides      [address]   [address]  np         Upper trap STM DURGA [address]  [address]  CF                                                             Exercise Diary  1/29  1/31  2/5  2/7  2/25  2/28  3/5  3/8        Chin tucks    2x10x5"  2x10x5"    2x10x5"  2x10x5"  5"  2x10  5" x 20        BL ER    OTB 2x15  OTB 2x15    OTB 2x15    OTB 2x15  OTB 2x15  OTB  2x15 OTB 2 x15        Wall slides    2x10  2x10  2x10    2x10  2x10  2 x10        t-band rows    2x10 GTB  2x10 GTB  2x10 GTB  2x10 GTB  2x10 GTB  2x10  GTB  GTB 2 x10        t-band extension    2x10 OTB  2x10 OTB  2x10 GTB  2x10 GTB  2x10 GTB  2x10  GTB GTB 2 x10         foam roll      5x1'  5x1'  5x1'  5x1'  5x1'  5 x1'         serratus punches              2x10  2 x10         T's                        Y's                        UBE        5'  3/3  3/3  3'/3'  3'/3'                                                                                                                                                                                                                                                           Modalities   1/31 2/5                   Heat- pre exercise  5  5'                                                                          Assessment: Pt progressed through exercises well with no increase pain pain and min to moderate fatigue near end of session  Pt demonstrates fair scapular control as she is able to retract better but as she fatigues her upper taps compensation for movements  Pt has tenderness along her c-spine  Pt would continue to benefit form PT  Plan: Progress treatment as tolerated

## 2019-03-11 ENCOUNTER — APPOINTMENT (OUTPATIENT)
Dept: PHYSICAL THERAPY | Facility: REHABILITATION | Age: 49
End: 2019-03-11
Payer: COMMERCIAL

## 2019-03-14 ENCOUNTER — OFFICE VISIT (OUTPATIENT)
Dept: PHYSICAL THERAPY | Facility: REHABILITATION | Age: 49
End: 2019-03-14
Payer: COMMERCIAL

## 2019-03-14 DIAGNOSIS — M54.2 CERVICAL PAIN: ICD-10-CM

## 2019-03-14 DIAGNOSIS — R42 DIZZINESS: ICD-10-CM

## 2019-03-14 DIAGNOSIS — M54.6 CHRONIC BILATERAL THORACIC BACK PAIN: Primary | ICD-10-CM

## 2019-03-14 DIAGNOSIS — G89.29 CHRONIC BILATERAL THORACIC BACK PAIN: Primary | ICD-10-CM

## 2019-03-14 DIAGNOSIS — Z98.890 S/P BILATERAL BREAST REDUCTION: ICD-10-CM

## 2019-03-14 PROCEDURE — 97110 THERAPEUTIC EXERCISES: CPT | Performed by: PHYSICAL THERAPIST

## 2019-03-14 PROCEDURE — 97112 NEUROMUSCULAR REEDUCATION: CPT | Performed by: PHYSICAL THERAPIST

## 2019-03-14 PROCEDURE — 97140 MANUAL THERAPY 1/> REGIONS: CPT | Performed by: PHYSICAL THERAPIST

## 2019-03-14 NOTE — PROGRESS NOTES
Daily Note     Today's date: 3/14/2019  Patient name: Fauzia Fregoso  : 1970  MRN: [id number]  Referring provider: Db Burnett MD  Dx:   Encounter Diagnosis     ICD-10-CM    1  Chronic bilateral thoracic back pain M54 6     G89 29    2  Cervical pain M54 2    3  S/P bilateral breast reduction Z98 890    4  Dizziness R42                   Subjective: Pt reports that she continues to feel improved but reports pain in the upper trap area        Objective: See treatment diary below  Precautions: gastric bipass, mammoplasty, depression     Daily Treatment Diary      Manual  1/29  1/31  2/5  2/7  2/25  2/28  3/5  3/8  3/14     Shoulder PROM [address]  [address]     Thoracic mobilization (caution in prone) DURGA [address]  [address]  np   [address]     scap mobilization [address]                 Subscap/pec release   [address]  rk   [address]     Nerve glides      [address]   [address]  np   [address]     Upper trap STM DURGA [address]  rk  CF [address]                                                           Exercise Diary  1/29  1/31  2/5  2/7  2/25  2/28  3/5  3/8   3/14     Chin tucks    2x10x5"  2x10x5"    2x10x5"  2x10x5"  5"  2x10  5" x 20   5" x20     BL ER    OTB 2x15  OTB 2x15    OTB 2x15    OTB 2x15  OTB 2x15  OTB  2x15 OTB 2 x15   OTB 2x15     Wall slides    2x10  2x10  2x10    2x10  2x10  2 x10   2x10     t-band rows    2x10 GTB  2x10 GTB  2x10 GTB  2x10 GTB  2x10 GTB  2x10  GTB  GTB 2 x10   GTB 2x15     t-band extension    2x10 OTB  2x10 OTB  2x10 GTB  2x10 GTB  2x10 GTB  2x10  GTB GTB 2 x10   GTB 2x15      foam roll      5x1'  5x1'  5x1'  5x1'  5x1'  5 x1'   5x1'      serratus punches              2x10  2 x10   2x15 each 2#      T's                  2x10 each      Y's                        UBE        5'  33  3/3  3'/3'  3'/3'  33                                                                                                                                                                                                                                                           Modalities   1/31 2/5                   Heat- pre exercise  5  5'                                                                          Assessment: Pt  Presented with R sided C-spine muscular tenderness along with upper trap restriction  Continues to require cuing to decreased upper trap compensation  Pt showed fatigue post treatment but reported decreased pain  Pt will continue to benefit from PT  Plan: Progress treatment as tolerated

## 2019-03-18 ENCOUNTER — OFFICE VISIT (OUTPATIENT)
Dept: PHYSICAL THERAPY | Facility: REHABILITATION | Age: 49
End: 2019-03-18
Payer: COMMERCIAL

## 2019-03-18 DIAGNOSIS — R42 DIZZINESS: ICD-10-CM

## 2019-03-18 DIAGNOSIS — M54.2 CERVICAL PAIN: ICD-10-CM

## 2019-03-18 DIAGNOSIS — G89.29 CHRONIC BILATERAL THORACIC BACK PAIN: Primary | ICD-10-CM

## 2019-03-18 DIAGNOSIS — M54.6 CHRONIC BILATERAL THORACIC BACK PAIN: Primary | ICD-10-CM

## 2019-03-18 DIAGNOSIS — Z98.890 S/P BILATERAL BREAST REDUCTION: ICD-10-CM

## 2019-03-18 PROCEDURE — 97140 MANUAL THERAPY 1/> REGIONS: CPT | Performed by: PHYSICAL THERAPIST

## 2019-03-18 PROCEDURE — 97110 THERAPEUTIC EXERCISES: CPT | Performed by: PHYSICAL THERAPIST

## 2019-03-18 PROCEDURE — 97112 NEUROMUSCULAR REEDUCATION: CPT | Performed by: PHYSICAL THERAPIST

## 2019-03-18 NOTE — PROGRESS NOTES
Daily Note     Today's date: 3/18/2019  Patient name: David Allen  : 1970  MRN: [id number]  Referring provider: Robyn Chapman MD  Dx:   Encounter Diagnosis     ICD-10-CM    1  Chronic bilateral thoracic back pain M54 6     G89 29    2  Cervical pain M54 2    3  S/P bilateral breast reduction Z98 890    4  Dizziness R42                   Subjective: Pt reports that she has pain in the lower thoracic region upon arrival  Also reports mild R sided neck pain  No other complaints        Objective: See treatment diary below  Precautions: gastric bipass, mammoplasty, depression     Daily Treatment Diary      Manual  1/29  1/31  2/5  2/7  2/25  2/28  3/5  3/8  3/14  3/18   Shoulder PROM [address]  rk [address]   Thoracic mobilization (caution in prone) D[address]GA [address]  [address]  np   [address]   scap mobilization [address]                 Subscap/pec release   [address]  rk   [address]   Nerve glides      [address]   [address]  np   [address]   Upper trap STM [address]                                                         Exercise Diary  1/29  1/31  2/5  2/7  2/25  2/28  3/5  3/8   3/14  3/18   Chin tucks    2x10x5"  2x10x5"    2x10x5"  2x10x5"  5"  2x10  5" x 20   5" x20  30 x3"   BL ER    OTB 2x15  OTB 2x15    OTB 2x15    OTB 2x15  OTB 2x15  OTB  2x15 OTB 2 x15   OTB 2x15  OTB 2x15   Wall slides    2x10  2x10  2x10    2x10  2x10  2 x10   2x10  2x15   t-band rows    2x10 GTB  2x10 GTB  2x10 GTB  2x10 GTB  2x10 GTB  2x10  GTB  GTB 2 x10   GTB 2x15  GTB 2x15   t-band extension    2x10 OTB  2x10 OTB  2x10 GTB  2x10 GTB  2x10 GTB  2x10  GTB GTB 2 x10   GTB 2x15  GTB 2x15    foam roll      5x1'  5x1'  5x1'  5x1'  5x1'  5 x1'   5x1'  5x1'    serratus punches              2x10  2 x10   2x15 each 2#  2x15 each 2#    T's                  2x10 each  2x10 each    Y's                        UBE        5'  33  3/3  3'/3'  3'/3'  3/3  3/3                                                                                                                                                                                                                                                         Modalities   1/31 2/5                   Heat- pre exercise  5  5'                                                                          Assessment: Pt presents with R sided SCM tenderness along with lower thoracic hypomobility  Positive response to manual therapy  Pt able to perform exercise without complaints of pain  Reports relief post treatment  Plan: Progress treatment as tolerated

## 2019-03-21 ENCOUNTER — OFFICE VISIT (OUTPATIENT)
Dept: PHYSICAL THERAPY | Facility: REHABILITATION | Age: 49
End: 2019-03-21
Payer: COMMERCIAL

## 2019-03-21 DIAGNOSIS — Z98.890 S/P BILATERAL BREAST REDUCTION: ICD-10-CM

## 2019-03-21 DIAGNOSIS — G89.29 CHRONIC BILATERAL THORACIC BACK PAIN: Primary | ICD-10-CM

## 2019-03-21 DIAGNOSIS — R42 DIZZINESS: ICD-10-CM

## 2019-03-21 DIAGNOSIS — M54.6 CHRONIC BILATERAL THORACIC BACK PAIN: Primary | ICD-10-CM

## 2019-03-21 DIAGNOSIS — M54.2 CERVICAL PAIN: ICD-10-CM

## 2019-03-21 PROCEDURE — 97140 MANUAL THERAPY 1/> REGIONS: CPT | Performed by: PHYSICAL THERAPIST

## 2019-03-21 PROCEDURE — 97110 THERAPEUTIC EXERCISES: CPT | Performed by: PHYSICAL THERAPIST

## 2019-03-21 PROCEDURE — 97112 NEUROMUSCULAR REEDUCATION: CPT | Performed by: PHYSICAL THERAPIST

## 2019-03-21 NOTE — PROGRESS NOTES
Daily Note     Today's date: 3/21/2019  Patient name: Jarod Cates  : 1970  MRN: [id number]  Referring provider: Omi Cardoza MD  Dx:   Encounter Diagnosis     ICD-10-CM    1  Chronic bilateral thoracic back pain M54 6     G89 29    2  Cervical pain M54 2    3  S/P bilateral breast reduction Z98 890    4  Dizziness R42                   Subjective: Pt reports that she continues to have pain but it is intermittent and mostly thoracic  States pain worsens with prolonged positions or heavy lifting  Objective: See treatment diary below  Precautions: gastric bipass, mammoplasty, depression     Daily Treatment Diary      Manual   3/5  3/8  3/14  3/18 3/21   Shoulder PROM  rk CF [address] [facility]   Thoracic mobilization (caution in prone)  np   [facility]   scap mobilization         [facility]   Subscap/pec release  rk   [facility]   Nerve glides   np   [facility]   Upper trap STM Herb Nancy [facility]                                   Exercise Diary   3/5  3/8   3/14  3/18 3/21   Chin tucks  5"  2x10  5" x 20   5" x20  30 x3"    BL ER  OTB  2x15 OTB 2 x15   OTB 2x15  OTB 2x15    Wall slides  2x10  2 x10   2x10  2x15    t-band rows  2x10  GTB  GTB 2 x10   GTB 2x15  GTB 2x15 On p-ball 2x15   t-band extension  2x10  GTB GTB 2 x10   GTB 2x15  GTB 2x15 p-ball 2x15    foam roll  5x1'  5 x1'   5x1'  5x1' 5x1'    serratus punches  2x10  2 x10   2x15 each 2#  2x15 each 2#     T's      2x10 each  2x10 each     Y's             UBE  3'/3'  3'/3'  3/3  3/3 3/3    p-ball arm raises         20 each    sidebending stretch         3x30"    pec stretch         3x30"                                                                                                     Modalities                      Heat- pre exercise  5  5'                                                                          Assessment: Pt shows improved cervical mobility his visit   Continues to present with adhesions surrounding scapula secondary to scar tissue  Pt continues to compensation for decreased scapular upward rotation with upper trap activation  Pt shows fatigue post treatment  Plan: Progress treatment as tolerated

## 2019-03-25 ENCOUNTER — OFFICE VISIT (OUTPATIENT)
Dept: PHYSICAL THERAPY | Facility: REHABILITATION | Age: 49
End: 2019-03-25
Payer: COMMERCIAL

## 2019-03-25 ENCOUNTER — OFFICE VISIT (OUTPATIENT)
Dept: OBGYN CLINIC | Facility: HOSPITAL | Age: 49
End: 2019-03-25
Payer: COMMERCIAL

## 2019-03-25 VITALS
DIASTOLIC BLOOD PRESSURE: 72 MMHG | SYSTOLIC BLOOD PRESSURE: 105 MMHG | BODY MASS INDEX: 24.55 KG/M2 | WEIGHT: 130 LBS | HEART RATE: 82 BPM | HEIGHT: 61 IN

## 2019-03-25 DIAGNOSIS — M54.12 RADICULOPATHY, CERVICAL REGION: Primary | ICD-10-CM

## 2019-03-25 DIAGNOSIS — R42 DIZZINESS: ICD-10-CM

## 2019-03-25 DIAGNOSIS — M54.2 CERVICAL PAIN: ICD-10-CM

## 2019-03-25 DIAGNOSIS — M54.6 CHRONIC BILATERAL THORACIC BACK PAIN: Primary | ICD-10-CM

## 2019-03-25 DIAGNOSIS — G89.29 CHRONIC BILATERAL THORACIC BACK PAIN: Primary | ICD-10-CM

## 2019-03-25 DIAGNOSIS — Z98.890 S/P BILATERAL BREAST REDUCTION: ICD-10-CM

## 2019-03-25 DIAGNOSIS — G89.29 CHRONIC THORACIC BACK PAIN, UNSPECIFIED BACK PAIN LATERALITY: ICD-10-CM

## 2019-03-25 DIAGNOSIS — M54.6 CHRONIC THORACIC BACK PAIN, UNSPECIFIED BACK PAIN LATERALITY: ICD-10-CM

## 2019-03-25 PROCEDURE — 97140 MANUAL THERAPY 1/> REGIONS: CPT | Performed by: PHYSICAL THERAPIST

## 2019-03-25 PROCEDURE — 97112 NEUROMUSCULAR REEDUCATION: CPT | Performed by: PHYSICAL THERAPIST

## 2019-03-25 PROCEDURE — 99213 OFFICE O/P EST LOW 20 MIN: CPT | Performed by: ORTHOPAEDIC SURGERY

## 2019-03-25 PROCEDURE — 97110 THERAPEUTIC EXERCISES: CPT | Performed by: PHYSICAL THERAPIST

## 2019-03-25 NOTE — PROGRESS NOTES
51 y o female presents for evaluation of thoracic back pain as well as left-sided neck pain  Patient previously seen by Dr Suzan Hernandez in prescribed a course of physical therapy  Patient states that physical therapy is minimally improved her symptoms  She states that her pain is intermittent but present on most days  She currently describes her pain is 8/10  She states the pain is well localized to the central aspect of the thoracic spine as well as the left side of her cervical spine with occasional radiation down her left arm  Patient does have a history of carpal tunnel syndrome bilaterally which contributes to numbness and tingling in bilateral hands  Patient is currently on disability for carpal tunnel syndrome    Review of Systems  Review of systems negative unless otherwise specified in HPI    Past Medical History  Past Medical History:   Diagnosis Date    Achilles tendinitis     right     Allergic rhinitis     Anxiety     Back pain     Back pain     Bariatric surgery status     Carpal tunnel syndrome     Last Assessed: 2016    Depression     Generalized obesity     Last Assessed: 10/5/2016    Palpitations     Last Assessed: 2013    Plantar fasciitis     bilateral    Positive PPD     Last Assessed: 10/21/2014    Postgastrectomy malabsorption     Tendonitis, Achilles     Last Assessed: 3/3/2017    Varicose vein of leg     Viral warts     Last Assessed: 2013       Past Surgical History  Past Surgical History:   Procedure Laterality Date    CARPAL TUNNEL RELEASE       SECTION      x3    ESOPHAGOGASTRODUODENOSCOPY N/A 2017    Procedure: ESOPHAGOGASTRODUODENOSCOPY (EGD);   Surgeon: Lc Oshea MD;  Location: [facility];  Service: [facility]       IR IMAGE GUIDED ASPIRATION / DRAINAGE W TUBE  2018    NOSE SURGERY      PARAESOPHAGEAL HERNIA REPAIR N/A 2017    Procedure: REPAIR HERNIA PARAESOPHAGEAL  LAPAROSCOPIC;  Surgeon: Cheryle Mires June Watkins MD;  [facility];  Service: Bariatrics    IN EGD TRANSORAL BIOPSY SINGLE/MULTIPLE N/A 8/2/2017    Procedure: ESOPHAGOGASTRODUODENOSCOPY (EGD) with biopsy;  Surgeon: Barbara Bellamy MD;  [facility]; Service: Bariatrics    IN LAP GASTRIC BYPASS/MARCIN-EN-Y N/A 11/6/2017    Procedure: BYPASS GASTRIC  MARCIN-EN-Y LAPAROSCOPIC;  Surgeon: Barbara Bellamy MD;  [facility];  Service: Bariatrics    IN REDUCTION OF LARGE BREAST Bilateral 8/14/2018    Procedure: REDUCTION MAMMOPLASTY BREAST;  Surgeon: Kierra Meek MD;  [facility];  Service: Plastics    IN WRIST Kalyani Bud LIG Right 1/27/2017    Procedure: ENDOSCOPIC CARPAL TUNNEL RELEASE ;  Surgeon: Pauly Costello MD;  [facility];  Service: Orthopedics    IN WRIST Kalyani Bud LIG Left 6/16/2017    Procedure: ENDOSCOPIC CARPAL TUNNEL RELEASE;  Surgeon: Pauly Costello MD;  [facility];  Service: Orthopedics    TONSILLECTOMY      TUBAL LIGATION      WISDOM TOOTH EXTRACTION         Current Medications  Current Outpatient Medications on File Prior to Visit   Medication Sig Dispense Refill    Biotin w/ Vitamins C & E 1250-7 5-7 5 MCG-MG-UNT CHEW Chew      Calcium Citrate-Vitamin D (CALCIUM CITRATE+D3 PO) Take 1 tablet by mouth 3 (three) times a day      cetirizine (ZyrTEC) 10 mg tablet Take 10 mg by mouth daily as needed for allergies      citalopram (CeleXA) 20 mg tablet Take 20 mg by mouth daily  1    DAILY MULTIPLE VITAMINS/IRON PO Take by mouth      Melatonin 10 MG TABS Take 10 mg by mouth daily at bedtime as needed      NON FORMULARY Take 1 capsule by mouth daily      omeprazole (PriLOSEC) 20 mg delayed release capsule Take 1 capsule (20 mg total) by mouth daily 90 capsule 1    zolpidem (AMBIEN) 10 mg tablet Take 10 mg by mouth daily  1     No current facility-administered medications on file prior to visit          Recent Labs (HCT,HGB,PT,INR,ESR,CRP,GLU,HgA1C)  0   Lab Value Date/Time    HCT 40 2 11/08/2018 0915    HCT 40 9 12/11/2015 1710    HGB 13 6 11/08/2018 0915    HGB 14 2 12/11/2015 1710    WBC 4 89 11/08/2018 0915    WBC 10 38 (H) 12/11/2015 1710    GLUCOSE 90 12/11/2015 1710         Physical exam  · General: Awake, Alert, Oriented  · Eyes: Pupils equal, round and reactive to light  · Heart:  No palpable arrhythmia  · Lungs: No audible wheezing  · Abdomen: soft  Back exam  · Mild tenderness palpation midline thoracic spine, mild tenderness palpation left greater than right cervical paraspinal soft tissue  · strength 5/5 symmetric C5-T1  · Strength 5/5 symmetric L2-S1  · Sensation intact to light touch symmetric C5-T1  · Sensation intact to light touch symmetric L2-S1  · Negative modified straight leg raise  · Negative Altman's  · Negative Spurling's  · Negative Lhermitte's  · Distal extremities warm well per        Imaging  X-rays of thoracic and lower cervical spine reviewed at today's visit  Reveals no fractures with mild degenerative changes with mild decreased disc space height    43-year-old female with chronic thoracic and cervical neck pain  Weightbearing as tolerated  Activity as tolerated  As patient has failed conservative treatment including failed physical therapy, MRI of thoracic and cervical spine will be ordered to better evaluate underlying pathology  Follow up in office after completion of MRI studies to discuss the results

## 2019-03-25 NOTE — PROGRESS NOTES
Daily Note     Today's date: 3/25/2019  Patient name: Flory Alvarez  : 1970  MRN: [id number]  Referring provider: Josse Sanches MD  Dx:   Encounter Diagnosis     ICD-10-CM    1  Chronic bilateral thoracic back pain M54 6     G89 29    2  Cervical pain M54 2    3  S/P bilateral breast reduction Z98 890    4  Dizziness R42                   Subjective: Pt reports that her shoulder feels better but she continues to have thoracic pain  Objective: See treatment diary below  Precautions: gastric bipass, mammoplasty, depression     Daily Treatment Diary      Manual   3/5  3/8  3/14  3/18 3/21 3/25   Shoulder PROM  rk CF [address] DUR[address]   Thoracic mobilization (caution in prone)  np   [address] DURGA DURGA   scap mobilization         DURGA DURGA   Subscap/pec release  rk   [address] DURGA DURGA   Nerve glides   np   [address]   Upper trap STM Harl Srikanth [address]                                     Exercise Diary   3/5  3/8   3/14  3/18 3/21 3/25   Chin tucks  5"  2x10  5" x 20   5" x20  30 x3"     BL ER  OTB  2x15 OTB 2 x15   OTB 2x15  OTB 2x15     Wall slides  2x10  2 x10   2x10  2x15     t-band rows  2x10  GTB  GTB 2 x10   GTB 2x15  GTB 2x15 On p-ball 2x15 2x15 GTB p-ball   t-band extension  2x10  GTB GTB 2 x10   GTB 2x15  GTB 2x15 p-ball 2x15 2x15 GTB pb    foam roll  5x1'  5 x1'   5x1'  5x1' 5x1' 5x1'    serratus punches  2x10  2 x10   2x15 each 2#  2x15 each 2#      T's      2x10 each  2x10 each      Y's              UBE  3'/3'  3'/3'  3/3  3/3 3/3 3/3    p-ball arm raises         20 each 20 each    sidebending stretch         3x30" 3x30"    pec stretch         3x30"  3x30"                                                                                                           Modalities                      Heat- pre exercise  5  5'                                                                          Assessment: Pt continues to be limited in scapular mobility secondary to scar tissue   No complaints with TE  Pt encouraged to continue scar massage at home  Plan: Progress treatment as tolerated

## 2019-03-28 ENCOUNTER — APPOINTMENT (OUTPATIENT)
Dept: PHYSICAL THERAPY | Facility: REHABILITATION | Age: 49
End: 2019-03-28
Payer: COMMERCIAL

## 2019-04-08 ENCOUNTER — APPOINTMENT (OUTPATIENT)
Dept: LAB | Facility: HOSPITAL | Age: 49
End: 2019-04-08
Payer: COMMERCIAL

## 2019-04-08 ENCOUNTER — OFFICE VISIT (OUTPATIENT)
Dept: FAMILY MEDICINE CLINIC | Facility: CLINIC | Age: 49
End: 2019-04-08

## 2019-04-08 VITALS
WEIGHT: 125 LBS | TEMPERATURE: 98.2 F | HEART RATE: 68 BPM | SYSTOLIC BLOOD PRESSURE: 110 MMHG | DIASTOLIC BLOOD PRESSURE: 64 MMHG | BODY MASS INDEX: 23.6 KG/M2 | RESPIRATION RATE: 18 BRPM | HEIGHT: 61 IN

## 2019-04-08 DIAGNOSIS — Z72.51 UNPROTECTED SEX: ICD-10-CM

## 2019-04-08 DIAGNOSIS — F32.0 CURRENT MILD EPISODE OF MAJOR DEPRESSIVE DISORDER, UNSPECIFIED WHETHER RECURRENT (HCC): ICD-10-CM

## 2019-04-08 DIAGNOSIS — G89.29 CHRONIC MIDLINE THORACIC BACK PAIN: Primary | ICD-10-CM

## 2019-04-08 DIAGNOSIS — B37.2 CANDIDIASIS, INTERTRIGO: ICD-10-CM

## 2019-04-08 DIAGNOSIS — M54.6 CHRONIC MIDLINE THORACIC BACK PAIN: Primary | ICD-10-CM

## 2019-04-08 DIAGNOSIS — M62.838 MUSCLE SPASM: ICD-10-CM

## 2019-04-08 PROBLEM — Z00.00 ROUTINE ADULT HEALTH MAINTENANCE: Status: RESOLVED | Noted: 2018-04-29 | Resolved: 2019-04-08

## 2019-04-08 PROCEDURE — 87591 N.GONORRHOEAE DNA AMP PROB: CPT

## 2019-04-08 PROCEDURE — 87491 CHLMYD TRACH DNA AMP PROBE: CPT

## 2019-04-08 PROCEDURE — 86592 SYPHILIS TEST NON-TREP QUAL: CPT

## 2019-04-08 PROCEDURE — 36415 COLL VENOUS BLD VENIPUNCTURE: CPT

## 2019-04-08 PROCEDURE — 87389 HIV-1 AG W/HIV-1&-2 AB AG IA: CPT

## 2019-04-08 PROCEDURE — 99213 OFFICE O/P EST LOW 20 MIN: CPT | Performed by: FAMILY MEDICINE

## 2019-04-08 RX ORDER — DULOXETIN HYDROCHLORIDE 20 MG/1
20 CAPSULE, DELAYED RELEASE ORAL DAILY
Qty: 30 CAPSULE | Refills: 1 | Status: SHIPPED | OUTPATIENT
Start: 2019-04-08 | End: 2019-04-08 | Stop reason: SDUPTHER

## 2019-04-08 RX ORDER — CYCLOBENZAPRINE HCL 5 MG
5 TABLET ORAL 3 TIMES DAILY PRN
Qty: 30 TABLET | Refills: 1 | Status: SHIPPED | OUTPATIENT
Start: 2019-04-08 | End: 2019-04-08

## 2019-04-08 RX ORDER — DULOXETIN HYDROCHLORIDE 20 MG/1
20 CAPSULE, DELAYED RELEASE ORAL DAILY
Qty: 30 CAPSULE | Refills: 1 | Status: SHIPPED | OUTPATIENT
Start: 2019-04-08 | End: 2019-06-26 | Stop reason: SDUPTHER

## 2019-04-09 ENCOUNTER — HOSPITAL ENCOUNTER (OUTPATIENT)
Dept: RADIOLOGY | Facility: HOSPITAL | Age: 49
Discharge: HOME | End: 2019-04-09
Payer: COMMERCIAL

## 2019-04-09 DIAGNOSIS — M54.12 RADICULOPATHY, CERVICAL REGION: ICD-10-CM

## 2019-04-09 PROCEDURE — 72141 MRI NECK SPINE W/O DYE: CPT

## 2019-04-09 PROCEDURE — 72146 MRI CHEST SPINE W/O DYE: CPT

## 2019-04-10 LAB — HIV 1+2 AB+HIV1 P24 AG SERPL QL IA: NORMAL

## 2019-04-11 ENCOUNTER — TELEPHONE (OUTPATIENT)
Dept: FAMILY MEDICINE CLINIC | Facility: CLINIC | Age: 49
End: 2019-04-11

## 2019-04-15 ENCOUNTER — TELEPHONE (OUTPATIENT)
Dept: FAMILY MEDICINE CLINIC | Facility: CLINIC | Age: 49
End: 2019-04-15

## 2019-04-15 ENCOUNTER — OFFICE VISIT (OUTPATIENT)
Dept: OBGYN CLINIC | Facility: HOSPITAL | Age: 49
End: 2019-04-15
Payer: COMMERCIAL

## 2019-04-15 VITALS
HEIGHT: 61 IN | SYSTOLIC BLOOD PRESSURE: 118 MMHG | HEART RATE: 64 BPM | BODY MASS INDEX: 23.6 KG/M2 | DIASTOLIC BLOOD PRESSURE: 78 MMHG | WEIGHT: 125 LBS

## 2019-04-15 DIAGNOSIS — Z12.31 VISIT FOR SCREENING MAMMOGRAM: Primary | ICD-10-CM

## 2019-04-15 DIAGNOSIS — M54.12 RADICULOPATHY, CERVICAL REGION: Primary | ICD-10-CM

## 2019-04-15 DIAGNOSIS — M54.2 CERVICALGIA: ICD-10-CM

## 2019-04-15 PROCEDURE — 99213 OFFICE O/P EST LOW 20 MIN: CPT | Performed by: ORTHOPAEDIC SURGERY

## 2019-04-24 ENCOUNTER — TRANSCRIBE ORDERS (OUTPATIENT)
Dept: ADMINISTRATIVE | Facility: HOSPITAL | Age: 49
End: 2019-04-24

## 2019-04-24 DIAGNOSIS — Z12.39 BREAST SCREENING, UNSPECIFIED: Primary | ICD-10-CM

## 2019-04-25 ENCOUNTER — CLINICAL SUPPORT (OUTPATIENT)
Dept: PAIN MEDICINE | Facility: CLINIC | Age: 49
End: 2019-04-25
Payer: COMMERCIAL

## 2019-04-25 VITALS
HEIGHT: 61 IN | BODY MASS INDEX: 24.17 KG/M2 | SYSTOLIC BLOOD PRESSURE: 105 MMHG | WEIGHT: 128 LBS | HEART RATE: 65 BPM | DIASTOLIC BLOOD PRESSURE: 70 MMHG | TEMPERATURE: 98.9 F

## 2019-04-25 DIAGNOSIS — Z12.31 VISIT FOR SCREENING MAMMOGRAM: ICD-10-CM

## 2019-04-25 DIAGNOSIS — M79.18 MYOFASCIAL PAIN: ICD-10-CM

## 2019-04-25 DIAGNOSIS — M54.12 RADICULOPATHY, CERVICAL REGION: Primary | ICD-10-CM

## 2019-04-25 DIAGNOSIS — M54.9 MID BACK PAIN: ICD-10-CM

## 2019-04-25 DIAGNOSIS — M50.30 DDD (DEGENERATIVE DISC DISEASE), CERVICAL: ICD-10-CM

## 2019-04-25 DIAGNOSIS — M54.2 NECK PAIN: ICD-10-CM

## 2019-04-25 PROCEDURE — 99244 OFF/OP CNSLTJ NEW/EST MOD 40: CPT | Performed by: ANESTHESIOLOGY

## 2019-04-25 RX ORDER — TRAZODONE HYDROCHLORIDE 100 MG/1
100 TABLET ORAL
COMMUNITY
End: 2019-12-09 | Stop reason: SDUPTHER

## 2019-04-25 RX ORDER — CYCLOBENZAPRINE HCL 5 MG
5 TABLET ORAL 3 TIMES DAILY PRN
Refills: 1 | COMMUNITY
Start: 2019-04-08 | End: 2020-11-13

## 2019-05-06 ENCOUNTER — HOSPITAL ENCOUNTER (OUTPATIENT)
Dept: RADIOLOGY | Facility: HOSPITAL | Age: 49
Discharge: HOME | End: 2019-05-06
Payer: COMMERCIAL

## 2019-05-06 VITALS — HEIGHT: 61 IN | BODY MASS INDEX: 24.17 KG/M2 | WEIGHT: 128 LBS

## 2019-05-06 DIAGNOSIS — Z12.39 BREAST SCREENING, UNSPECIFIED: ICD-10-CM

## 2019-05-06 PROCEDURE — 77067 SCR MAMMO BI INCL CAD: CPT

## 2019-05-20 ENCOUNTER — OFFICE VISIT (OUTPATIENT)
Dept: DERMATOLOGY | Facility: CLINIC | Age: 49
End: 2019-05-20
Payer: COMMERCIAL

## 2019-05-20 VITALS — WEIGHT: 130 LBS | HEIGHT: 61 IN | TEMPERATURE: 97.7 F | BODY MASS INDEX: 24.55 KG/M2

## 2019-05-20 DIAGNOSIS — D22.39 FIBROUS PAPULE OF NOSE: ICD-10-CM

## 2019-05-20 DIAGNOSIS — D22.9 MULTIPLE MELANOCYTIC NEVUS: ICD-10-CM

## 2019-05-20 DIAGNOSIS — L90.5 SCAR: ICD-10-CM

## 2019-05-20 DIAGNOSIS — D18.01 CHERRY ANGIOMA: ICD-10-CM

## 2019-05-20 DIAGNOSIS — L82.1 SEBORRHEIC KERATOSIS: Primary | ICD-10-CM

## 2019-05-20 PROCEDURE — 99243 OFF/OP CNSLTJ NEW/EST LOW 30: CPT | Performed by: DERMATOLOGY

## 2019-05-21 ENCOUNTER — HOSPITAL ENCOUNTER (OUTPATIENT)
Dept: RADIOLOGY | Facility: CLINIC | Age: 49
Discharge: HOME | End: 2019-05-21
Admitting: ANESTHESIOLOGY
Payer: COMMERCIAL

## 2019-05-21 VITALS
HEART RATE: 69 BPM | RESPIRATION RATE: 20 BRPM | DIASTOLIC BLOOD PRESSURE: 66 MMHG | TEMPERATURE: 97.8 F | OXYGEN SATURATION: 98 % | SYSTOLIC BLOOD PRESSURE: 103 MMHG

## 2019-05-21 DIAGNOSIS — M54.12 RADICULOPATHY, CERVICAL REGION: ICD-10-CM

## 2019-05-21 PROCEDURE — 62321 NJX INTERLAMINAR CRV/THRC: CPT | Performed by: ANESTHESIOLOGY

## 2019-05-21 RX ORDER — PAPAVERINE HCL 150 MG
10 CAPSULE, EXTENDED RELEASE ORAL ONCE
Status: COMPLETED | OUTPATIENT
Start: 2019-05-21 | End: 2019-05-21

## 2019-05-21 RX ORDER — LIDOCAINE HYDROCHLORIDE 10 MG/ML
5 INJECTION, SOLUTION EPIDURAL; INFILTRATION; INTRACAUDAL; PERINEURAL ONCE
Status: COMPLETED | OUTPATIENT
Start: 2019-05-21 | End: 2019-05-21

## 2019-05-21 RX ADMIN — IOHEXOL 1 ML: 300 INJECTION, SOLUTION INTRAVENOUS at 11:52

## 2019-05-21 RX ADMIN — DEXAMETHASONE SODIUM PHOSPHATE 10 MG: 10 INJECTION, SOLUTION INTRAMUSCULAR; INTRAVENOUS at 11:53

## 2019-05-21 RX ADMIN — LIDOCAINE HYDROCHLORIDE 3 ML: 10 INJECTION, SOLUTION EPIDURAL; INFILTRATION; INTRACAUDAL; PERINEURAL at 11:50

## 2019-05-27 DIAGNOSIS — Z98.84 BARIATRIC SURGERY STATUS: ICD-10-CM

## 2019-05-27 DIAGNOSIS — F17.200 SMOKER: ICD-10-CM

## 2019-05-28 ENCOUNTER — TELEPHONE (OUTPATIENT)
Dept: PAIN MEDICINE | Facility: CLINIC | Age: 49
End: 2019-05-28

## 2019-05-28 RX ORDER — OMEPRAZOLE 20 MG/1
CAPSULE, DELAYED RELEASE ORAL
Qty: 90 CAPSULE | Refills: 1 | Status: SHIPPED | OUTPATIENT
Start: 2019-05-28 | End: 2019-12-03 | Stop reason: SDUPTHER

## 2019-06-16 DIAGNOSIS — F32.0 CURRENT MILD EPISODE OF MAJOR DEPRESSIVE DISORDER, UNSPECIFIED WHETHER RECURRENT (HCC): ICD-10-CM

## 2019-06-16 RX ORDER — DULOXETIN HYDROCHLORIDE 20 MG/1
CAPSULE, DELAYED RELEASE ORAL
Qty: 30 CAPSULE | Refills: 1 | OUTPATIENT
Start: 2019-06-16

## 2019-06-24 ENCOUNTER — TELEPHONE (OUTPATIENT)
Dept: FAMILY MEDICINE CLINIC | Facility: CLINIC | Age: 49
End: 2019-06-24

## 2019-06-26 ENCOUNTER — OFFICE VISIT (OUTPATIENT)
Dept: FAMILY MEDICINE CLINIC | Facility: CLINIC | Age: 49
End: 2019-06-26

## 2019-06-26 VITALS
WEIGHT: 130.2 LBS | TEMPERATURE: 96.4 F | RESPIRATION RATE: 16 BRPM | SYSTOLIC BLOOD PRESSURE: 110 MMHG | HEART RATE: 78 BPM | BODY MASS INDEX: 24.58 KG/M2 | HEIGHT: 61 IN | DIASTOLIC BLOOD PRESSURE: 80 MMHG

## 2019-06-26 DIAGNOSIS — L20.82 FLEXURAL ECZEMA: Primary | ICD-10-CM

## 2019-06-26 DIAGNOSIS — F32.0 CURRENT MILD EPISODE OF MAJOR DEPRESSIVE DISORDER, UNSPECIFIED WHETHER RECURRENT (HCC): ICD-10-CM

## 2019-06-26 PROCEDURE — 99213 OFFICE O/P EST LOW 20 MIN: CPT | Performed by: FAMILY MEDICINE

## 2019-06-26 RX ORDER — DULOXETIN HYDROCHLORIDE 20 MG/1
20 CAPSULE, DELAYED RELEASE ORAL DAILY
Qty: 30 CAPSULE | Refills: 3 | Status: SHIPPED | OUTPATIENT
Start: 2019-06-26 | End: 2020-01-28 | Stop reason: SDUPTHER

## 2019-06-26 RX ORDER — DIAPER,BRIEF,INFANT-TODD,DISP
EACH MISCELLANEOUS 2 TIMES DAILY
Qty: 30 G | Refills: 1 | Status: SHIPPED | OUTPATIENT
Start: 2019-06-26

## 2019-09-19 ENCOUNTER — OFFICE VISIT (OUTPATIENT)
Dept: FAMILY MEDICINE CLINIC | Facility: CLINIC | Age: 49
End: 2019-09-19

## 2019-09-19 VITALS
WEIGHT: 133 LBS | HEIGHT: 61 IN | TEMPERATURE: 98.4 F | RESPIRATION RATE: 18 BRPM | SYSTOLIC BLOOD PRESSURE: 112 MMHG | BODY MASS INDEX: 25.11 KG/M2 | DIASTOLIC BLOOD PRESSURE: 60 MMHG | HEART RATE: 76 BPM

## 2019-09-19 DIAGNOSIS — F32.A DEPRESSION, UNSPECIFIED DEPRESSION TYPE: ICD-10-CM

## 2019-09-19 DIAGNOSIS — M25.562 LEFT MEDIAL KNEE PAIN: Primary | ICD-10-CM

## 2019-09-19 PROCEDURE — 3008F BODY MASS INDEX DOCD: CPT | Performed by: FAMILY MEDICINE

## 2019-09-19 PROCEDURE — 99213 OFFICE O/P EST LOW 20 MIN: CPT | Performed by: FAMILY MEDICINE

## 2019-09-19 NOTE — PROGRESS NOTES
Assessment/Plan:     Problem List Items Addressed This Visit        Other    Depression     Fluctuating moods without suicidal or homicidal ideation  Previously seeing psychiatrist and therapist in [place]   Requesting referral to [facility], they no longer accepting patient insurance  Continue Cymbalta daily  Refer to psych associates         Relevant Orders    Ambulatory referral to Psychiatry    Left medial knee pain - Primary     Left medial knee pain following an inversion injury 2 months ago  - no swelling, redness, crepitus, locking, weakness, numbness, or tingling   - does report ability to bear weight on the left knee and pain with position  - physical exam significant for deformity ( bony protrusion the medial aspect) and tenderness    - Order x-ray of the left knee  - Recommend rest, elevation, ice, and tylenol         Relevant Orders    XR knee 4+ vw left injury            Subjective:      Patient ID: Eugene Flores is a 51 y o  female  43-year-old female here for evaluation of left knee pain  Patient states she fell 2 months ago while dancing  Patient describes an inversion injury where she landed on a tiled floor  For 3 days patient could not bear weight on the knee  Pain eventually improved but has recently worsened  Patient has tried Tylenol with minimal relief  Given her history of gastric bypass she is unable to take anti-inflammatories  She denies fevers,  tick bites,  redness,  Numbness, tingling, or weakness  Patient also requesting a referral for behavioral health  Patient was seeing a therapist at [facility] in [facility]  Mood fluctuates but coping  Knee Pain    Incident onset: 2 month ago  Incident location: Patient fell while dancing  Inversion injury  Lande on tile floor  The injury mechanism was an inversion injury  The pain is present in the left knee (medial aspect )  The quality of the pain is described as stabbing and burning  The pain is at a severity of 7/10  The pain is moderate  The pain has been intermittent since onset  Associated symptoms include an inability to bear weight  Pertinent negatives include no loss of sensation, numbness or tingling  She reports no foreign bodies present  The symptoms are aggravated by movement and palpation  She has tried acetaminophen and rest for the symptoms  The following portions of the patient's history were reviewed and updated as appropriate: She  has a past medical history of Achilles tendinitis, Allergic rhinitis, Anxiety, Back pain, Back pain, Bariatric surgery status, Carpal tunnel syndrome, DDD (degenerative disc disease), cervical (2019), Depression, Generalized obesity, Palpitations, Plantar fasciitis, Positive PPD, Postgastrectomy malabsorption, Tendonitis, Achilles, Varicose vein of leg, and Viral warts  She   Patient Active Problem List    Diagnosis Date Noted    Left medial knee pain 2019    Myofascial pain 2019    DDD (degenerative disc disease), cervical 2019    Neck pain 04/15/2019    Muscle spasm 2019    Unprotected sex 2019    Radiculopathy, cervical region 2019    Bilateral mastodynia 01/15/2019    Concern about STD in female without diagnosis 01/15/2019    Candidiasis, intertrigo 01/15/2019    Alcohol use 2018    Smoker 2018    S/P bilateral breast reduction 2018    Abdominal pannus 2018    Encounter for routine gynecological examination with Papanicolaou smear of cervix 2018    Plantar fasciitis 2018    Mid back pain 2018    Perimenopause 2018    Acute pelvic pain, female 2018    Onychomycosis due to trichophyton mentagrophytes 2018    Bariatric surgery status 2018    Postsurgical malabsorption 2018    Depression     GERD (gastroesophageal reflux disease)     Dyslipidemia 2017     She  has a past surgical history that includes Tonsillectomy;   section; pr wrist arthroscop,release xvers lig (Right, 1/27/2017); pr wrist arthroscop,release xvers lig (Left, 6/16/2017); pr egd transoral biopsy single/multiple (N/A, 8/2/2017); Carpal tunnel release; [place] tooth extraction; pr lap gastric bypass/bong-en-y (N/A, 11/6/2017); Esophagogastroduodenoscopy (N/A, 11/6/2017); Paraesophageal hernia repair (N/A, 11/6/2017); Nose surgery; Tubal ligation; Hernia repair; pr reduction of large breast (Bilateral, 8/14/2018); IR image guided aspiration / drainage w tube (9/7/2018); and Reduction mammaplasty (Bilateral, 08/01/2018)  Her family history includes Alzheimer's disease in her maternal grandfather; Arthritis in her maternal aunt; Breast cancer (age of onset: 46) in her maternal aunt; Breast cancer additional onset (age of onset: 71) in her maternal aunt; Diabetes in her maternal aunt and mother; Heart disease in her father and mother; Hypertension in her father and maternal aunt; Kidney disease in her father; Stroke in her maternal grandmother and mother    Current Outpatient Medications   Medication Sig Dispense Refill    Biotin w/ Vitamins C & E 1250-7 5-7 5 MCG-MG-UNT CHEW Chew      Calcium Citrate-Vitamin D (CALCIUM CITRATE+D3 PO) Take 1 tablet by mouth 3 (three) times a day      cetirizine (ZyrTEC) 10 mg tablet Take 10 mg by mouth daily as needed for allergies      cyclobenzaprine (FLEXERIL) 5 mg tablet Take 5 mg by mouth 3 (three) times a day as needed  1    DAILY MULTIPLE VITAMINS/IRON PO Take by mouth      DULoxetine (CYMBALTA) 20 mg capsule Take 1 capsule (20 mg total) by mouth daily for 120 days 30 capsule 3    hydrocortisone 1 % cream Apply topically 2 (two) times a day 30 g 1    Melatonin 10 MG TABS Take 10 mg by mouth daily at bedtime as needed      omeprazole (PriLOSEC) 20 mg delayed release capsule TAKE 1 CAPSULE BY MOUTH EVERY DAY 90 capsule 1    traZODone (DESYREL) 100 mg tablet Take 100 mg by mouth daily at bedtime      zolpidem (AMBIEN) 10 mg tablet Take 10 mg by mouth daily  1    NON FORMULARY Take 1 capsule by mouth daily       No current facility-administered medications for this visit  She is allergic to latex and nsaids       Review of Systems   Constitutional: Negative for fever  Respiratory: Negative for chest tightness and shortness of breath  Cardiovascular: Negative for chest pain and palpitations  Musculoskeletal: Positive for arthralgias, back pain (Chronic), gait problem and joint swelling  Negative for myalgias  Skin: Negative  Neurological: Negative for tingling and numbness  Hematological: Negative for adenopathy  Objective:      /60   Pulse 76   Temp 98 4 °F (36 9 °C)   Resp 18   Ht 5' 1" (1 549 m)   Wt 60 3 kg (133 lb)   LMP 03/14/2018 (Approximate)   BMI 25 13 kg/m²          Physical Exam   Constitutional: She appears well-developed and well-nourished  No distress  HENT:   Head: Normocephalic and atraumatic  Cardiovascular: Normal rate, regular rhythm and normal heart sounds  No murmur heard  Pulmonary/Chest: Effort normal and breath sounds normal  No stridor  No respiratory distress  She has no wheezes  Abdominal: She exhibits no distension and no mass  There is no tenderness  There is no guarding  Musculoskeletal: Normal range of motion  She exhibits no edema  Left knee: She exhibits deformity (medial bony protrusion ) and bony tenderness  She exhibits normal range of motion, no swelling, no effusion, no ecchymosis, no erythema, no LCL laxity, normal patellar mobility and no MCL laxity  Lymphadenopathy:     She has no cervical adenopathy  Skin: Skin is warm  Capillary refill takes less than 2 seconds  Psychiatric: She has a normal mood and affect  Her behavior is normal  Judgment and thought content normal    Vitals reviewed

## 2019-09-19 NOTE — ASSESSMENT & PLAN NOTE
Left medial knee pain following an inversion injury 2 months ago  - no swelling, redness, crepitus, locking, weakness, numbness, or tingling   - does report ability to bear weight on the left knee and pain with position     - physical exam significant for deformity ( bony protrusion the medial aspect) and tenderness    - Order x-ray of the left knee  - Recommend rest, elevation, ice, and tylenol

## 2019-09-19 NOTE — ASSESSMENT & PLAN NOTE
Fluctuating moods without suicidal or homicidal ideation  Previously seeing psychiatrist and therapist in [place]   Requesting referral to [facility], they no longer accepting patient insurance  Continue Cymbalta daily  Refer to psych associates

## 2019-09-30 ENCOUNTER — TELEPHONE (OUTPATIENT)
Dept: PSYCHIATRY | Facility: CLINIC | Age: 49
End: 2019-09-30

## 2019-09-30 NOTE — TELEPHONE ENCOUNTER
Behavorial Health Outpatient Intake Questions    Referred by: PCP    Please advised interviewee that they need to answer all questions truthfully to allow for best care and any misrepresentations of information may affect their ability to be seen at this clinic   => Was this discussed? Yes     [facility]     Presenting Problem (in patient's words): depression, anxiety    Has the patient ever seen or is currently seeing a psychiatrist? Yes   If yes who/when? Recently, until insurance changed  [facility]  If seen as outpatient, have they been seen here (and by whom)? If not seen here, which provider(s) did the patient see and for how long? Has the patient ever seen or currently see a therapist? Yes If yes who/when? Recently,until insurance changed-[facility]  Has a member of the patient's family been in therapy here? No  If yes, with whom? Has the patient been hospitalized for mental health? No   If yes, how long ago was last hospitalization and where was it? Substance Abuse:No concerns of substance abuse are reported  Does the patient have ICM or CTT? No    Is the patient taking injectable psychiatric medications? No    => If yes, patient cannot be seen here  Communications  Are there any developmental disabilities? No    Does the patient have hearing impairment? No       History-    Has the patient served in the [place] [address]? No    If yes, have you had combat services? No    Was the patient activated into federal active duty as a member of the [employer] or reserve? No    Legal History-     Does the patient have any history of arrests, penitentiary/long-term time, or DUIs? No  If Yes-  1) What types of charges? 2) When were they last incarcerated? 3) Are they currently on parole or probation? Minor Child-    Who has custody of the child? Is there a custody agreement?      If there is a custody agreement remind parent that they must bring a copy to the first appt or they will not be seen  Intake Team, please check with provider before scheduling if flags come up such as:  - complex case  - legal history (other than DUI)  - communication barrier concerns are present- *pt primarily speaks [address] , but can speak [address]  as well*  - if, in your judgment, this needs further review    ACCEPTED as a patient Yes  => Appointment Date: Thursday, 10/24/19 @ 10:00am w/ Samantha Nolasco; Wednesday, 11/27/19 @ 1pm w/ Naa Hicks    Referred Elsewhere? No    Name of Insurance Co: [employer] ID# [id number]  Insurance Phone # [phone]  If ins is primary or secondary  If patient is a minor, parents information such as Name, D  O B of guarantor

## 2019-10-10 ENCOUNTER — HOSPITAL ENCOUNTER (OUTPATIENT)
Dept: RADIOLOGY | Facility: HOSPITAL | Age: 49
Discharge: HOME | End: 2019-10-10
Payer: COMMERCIAL

## 2019-10-10 ENCOUNTER — TRANSCRIBE ORDERS (OUTPATIENT)
Dept: RADIOLOGY | Facility: HOSPITAL | Age: 49
End: 2019-10-10

## 2019-10-10 DIAGNOSIS — M25.562 LEFT MEDIAL KNEE PAIN: ICD-10-CM

## 2019-10-10 PROCEDURE — 73564 X-RAY EXAM KNEE 4 OR MORE: CPT

## 2019-10-10 NOTE — TELEPHONE ENCOUNTER
Called patient to let her know that her chest x-ray showed bilateral infiltrates  Discussed with her if she has persistent fevers, shortness of breath, chest pain, difficulty breathing or any worsening symptoms she should go to the ER for further evaluation  Patient verbalized understanding of information  VIEW ALL (Pediatric)

## 2019-10-24 ENCOUNTER — SOCIAL WORK (OUTPATIENT)
Dept: BEHAVIORAL/MENTAL HEALTH CLINIC | Facility: CLINIC | Age: 49
End: 2019-10-24
Payer: COMMERCIAL

## 2019-10-24 DIAGNOSIS — F41.9 ANXIETY: ICD-10-CM

## 2019-10-24 DIAGNOSIS — F33.1 MODERATE EPISODE OF RECURRENT MAJOR DEPRESSIVE DISORDER (HCC): Primary | ICD-10-CM

## 2019-10-24 PROCEDURE — 90791 PSYCH DIAGNOSTIC EVALUATION: CPT | Performed by: SOCIAL WORKER
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
## 2019-10-24 NOTE — BH TREATMENT PLAN
Lesly De Jesus  1970       Date of Initial Treatment Plan: 10/24/19   Date of Current Treatment Plan: 10/24/19    Treatment Plan Number 1     Strengths/Personal Resources for Self Care: doesn't give up easily; has hope; tries to focus on positive  Diagnosis:   1  Moderate episode of recurrent major depressive disorder ([facility] [address] )     2  Anxiety         Area of Needs: Sam Alegre will continue to work on managing symptoms of depression and anxiety, including attending counseling and taking medication as prescribed  Long Term Goal 1: ARACELY Alegre will experience a decrease in symptoms of depression and Bdecrease symptoms of anxiety    Target Date:  Completion Date:          Short Term Objectives for Goal 1: Griffin Verde will be able to identify positive qualities and healthy coping skills    GOAL 1: Modality: Individual 1-2x per month   Completion Date tbd    GOAL 2: Modality: Medication Management     GOAL 3: Modality: The person(s) responsible for carrying out the plan is  [employer]: Diagnosis and Treatment Plan explained to Sam Lea relates understanding diagnosis and is agreeable to Treatment Plan  Client Comments : Please share your thoughts, feelings, need and/or experiences regarding your treatment plan: German Jenkins

## 2019-11-27 DIAGNOSIS — F17.200 SMOKER: ICD-10-CM

## 2019-11-27 DIAGNOSIS — Z98.84 BARIATRIC SURGERY STATUS: ICD-10-CM

## 2019-12-03 DIAGNOSIS — Z98.84 BARIATRIC SURGERY STATUS: Primary | ICD-10-CM

## 2019-12-03 RX ORDER — OMEPRAZOLE 20 MG/1
CAPSULE, DELAYED RELEASE ORAL
Qty: 90 CAPSULE | Refills: 1 | OUTPATIENT
Start: 2019-12-03

## 2019-12-03 RX ORDER — OMEPRAZOLE 20 MG/1
20 CAPSULE, DELAYED RELEASE ORAL DAILY
Qty: 30 CAPSULE | Refills: 1 | Status: SHIPPED | OUTPATIENT
Start: 2019-12-03 | End: 2021-12-16

## 2019-12-03 NOTE — TELEPHONE ENCOUNTER
I will give her a 30-day supply with only one refill since she is due to be seen and it appears she is out-of-town for December  No further refills planned after that until she is seen  Please let her know as able  Thanks   CR

## 2019-12-09 ENCOUNTER — OFFICE VISIT (OUTPATIENT)
Dept: PSYCHIATRY | Facility: CLINIC | Age: 49
End: 2019-12-09
Payer: COMMERCIAL

## 2019-12-09 DIAGNOSIS — F41.1 GAD (GENERALIZED ANXIETY DISORDER): ICD-10-CM

## 2019-12-09 DIAGNOSIS — F33.1 MODERATE EPISODE OF RECURRENT MAJOR DEPRESSIVE DISORDER (HCC): Primary | ICD-10-CM

## 2019-12-09 PROCEDURE — 90792 PSYCH DIAG EVAL W/MED SRVCS: CPT | Performed by: PHYSICIAN ASSISTANT

## 2019-12-09 PROCEDURE — 3725F SCREEN DEPRESSION PERFORMED: CPT | Performed by: PHYSICIAN ASSISTANT

## 2019-12-09 RX ORDER — TRAZODONE HYDROCHLORIDE 100 MG/1
50 TABLET ORAL
Qty: 30 TABLET | Refills: 1
Start: 2019-12-09 | End: 2020-04-24 | Stop reason: SDUPTHER
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
## 2020-01-03 ENCOUNTER — SOCIAL WORK (OUTPATIENT)
Dept: BEHAVIORAL/MENTAL HEALTH CLINIC | Facility: CLINIC | Age: 50
End: 2020-01-03
Payer: COMMERCIAL

## 2020-01-03 DIAGNOSIS — F41.1 GAD (GENERALIZED ANXIETY DISORDER): Primary | ICD-10-CM

## 2020-01-03 DIAGNOSIS — F33.1 MODERATE EPISODE OF RECURRENT MAJOR DEPRESSIVE DISORDER (HCC): ICD-10-CM

## 2020-01-03 DIAGNOSIS — Z72.89 ALCOHOL USE: ICD-10-CM

## 2020-01-03 PROCEDURE — 90834 PSYTX W PT 45 MINUTES: CPT | Performed by: SOCIAL WORKER

## 2020-01-03 NOTE — PSYCH
Psychotherapy Provided: Individual Psychotherapy 45 minutes     Length of time in session: 45 minutes, follow up in 8 weeks (due to client request; earlier appointment offered and declined)    Goals addressed in session: Goal 1     Pain:      none    0    Current suicide risk : Low     D: Discussed with Magy Severino importance of keeping scheduled appointments; she agreed to be more consistent  Magy Severino talked about feelings of depression and worries about current alcohol use  She reports drinking about 4-5 times weekly to intoxication  Magy Severino states she will not feel good about herself the next day  Explored with Magy Solishaja how she can set limits, e g , drinking less frequently and drinking fewer drinks  She reports her boyfriend is supportive of her wanting to make changes  Magy Severino continues to report missing her daughters in [place]  She hasn't worked in three years due to carpal tunnel, and she is depressed about this  Explored with Magyliane Severino ways to redefine her life  Magy Severino report prayer and her dominik as strengths  She does acknowledge having some friends  A: Magy Severino presented as depressed, soft spoken, flat affect (except when talking about pride in her daughters)  Magy Severino seems to be insightful with respect to abuse of alcohol and appears motivated to make changes  P:  Continue individual therapy; follow up 8 weeks  Behavioral Health Treatment Plan [employer]: Diagnosis and Treatment Plan explained to Franck Gutierrez relates understanding diagnosis and is agreeable to Treatment Plan   Yes

## 2020-01-28 ENCOUNTER — OFFICE VISIT (OUTPATIENT)
Dept: PSYCHIATRY | Facility: CLINIC | Age: 50
End: 2020-01-28
Payer: COMMERCIAL

## 2020-01-28 VITALS
HEART RATE: 61 BPM | SYSTOLIC BLOOD PRESSURE: 109 MMHG | WEIGHT: 133 LBS | BODY MASS INDEX: 25.13 KG/M2 | DIASTOLIC BLOOD PRESSURE: 71 MMHG

## 2020-01-28 DIAGNOSIS — F33.1 MODERATE EPISODE OF RECURRENT MAJOR DEPRESSIVE DISORDER (HCC): Primary | ICD-10-CM

## 2020-01-28 DIAGNOSIS — Z72.89 ALCOHOL USE: ICD-10-CM

## 2020-01-28 DIAGNOSIS — F41.1 GAD (GENERALIZED ANXIETY DISORDER): ICD-10-CM

## 2020-01-28 PROCEDURE — 99214 OFFICE O/P EST MOD 30 MIN: CPT | Performed by: PHYSICIAN ASSISTANT

## 2020-01-28 RX ORDER — ESCITALOPRAM OXALATE 5 MG/1
5 TABLET ORAL DAILY
Qty: 30 TABLET | Refills: 1 | Status: SHIPPED | OUTPATIENT
Start: 2020-01-29 | End: 2020-03-16

## 2020-01-28 RX ORDER — DULOXETIN HYDROCHLORIDE 20 MG/1
CAPSULE, DELAYED RELEASE ORAL
Qty: 30 CAPSULE | Refills: 3
Start: 2020-01-28 | End: 2020-04-24 | Stop reason: ALTCHOICE

## 2020-01-28 NOTE — PSYCH
MEDICATION MANAGEMENT NOTE        [address] [place] [address] Dr      Name and Date of Birth:  Ivis Ramesh 51 y o  1970 MRN: [id number]    Date of Visit: January 28, 2020    SUBJECTIVE:    Ad Tolentino is seen today for a follow up for Major Depressive Disorder and Generalized Anxiety Disorder  Today reports she feels since last visit there has been no significant change in her depression or anxiety symptoms  She denies anything getting any worse, though  She still admits to having some bad days with depressed mood and anhedonia    She admits to having intermittent changes in concentration and continues to have poor sleep and she wakes up frequently  She denies SI/HI or guilt or appetite changes  She still meets to feeling anxious and having constant worry and feeling restless  She denies irritability or sense of something awful might happen  She reports that she has started taking Cymbalta more consistently, but reports she has had abdominal pain with taking it more consistently and she is not sure she likes the medication  She also reports she has had decreased weakness with decrease in trazodone  In terms of alcohol use patient reports I have been more conscious of it and decreased a little bit    She still reports she is drinking about 2 times per week but instead of drinking 5-6 drinks she is drinking 4-5 beers or shots of fireball  Discussed today about continuing going to therapy more frequently which she reports she will for her alcohol use            HPI ROS:             ('was' notes: recent => remote)  Medication Side Effects:  less weakness with Trazodone, GI intolerance with Cymbalta  weakness after taking Trazodone   Depression (10 worst): moderate (Was moderate)   Anxiety (10 worst): moderate (Was moderate)   Safety concerns (SI, HI, etc): denies (Was denied)   Sleep: Frequent awakenings (Was some days increased, others decreased)   Energy: low (Was low)   Appetite: normal (Was decreased)   Weight Change: no no       Review Of Systems:      Constitutional low energy   Cardiovascular negative   Respiratory negative   Gastrointestinal abdominal pain   Musculoskeletal negative   Neurological negative   Endocrine negative       The following portions of the patient's history were reviewed and updated as appropriate: past family history, past medical history, past social history, past surgical history and problem list     Past Psychiatric History:     Past Psychiatric Medication Trials: Celexa, Cymbalta, Ambien and Trazodone      Past Medical History:    Past Medical History:   Diagnosis Date    Achilles tendinitis     right     Allergic rhinitis     Anxiety     Back pain     Back pain     Bariatric surgery status     Carpal tunnel syndrome     Last Assessed: 11/16/2016    DDD (degenerative disc disease), cervical 4/25/2019    Depression     Generalized obesity     Last Assessed: 10/5/2016    Palpitations     Last Assessed: 4/29/2013    Plantar fasciitis     bilateral    Positive PPD     Last Assessed: 10/21/2014    Postgastrectomy malabsorption     Suicide attempt ([facility] )     23 y/o x1 and 21 y/o 1 x    Tendonitis, Achilles     Last Assessed: 3/3/2017    Varicose vein of leg     Viral warts     Last Assessed: 4/29/2013     Past Medical History Pertinent Negatives:   Diagnosis Date Noted    Allergic 02/13/2019    Anemia 02/13/2019    Asthma 02/13/2019    Cancer ([facility] ) 02/13/2019    Chronic kidney disease 02/13/2019    Clotting disorder ([facility] ) 02/13/2019    COPD (chronic obstructive pulmonary disease) ([facility] ) 02/13/2019    Coronary artery disease 02/13/2019    Dementia ([facility] ) 02/13/2019    Diabetes mellitus ([facility] ) 02/13/2019    Disease of thyroid gland 11/13/2018    Diverticulitis of colon 02/13/2019    Eating disorder 02/13/2019    Heart murmur 02/13/2019    History of transfusion 10/12/2017    HL (hearing loss) 02/13/2019    Hypertension 2019    Infectious viral hepatitis 2019    Inflammatory bowel disease 2019    Kidney stone 2019    Memory loss 2019    Myocardial infarction ([facility] ) 2019    Osteoporosis 2019    Otitis media 2019    Pneumonia 2019    Scoliosis 2019    Seizures ([facility] ) 2019    Self-injurious behavior 2019    Shingles 2019    Stroke ([place] [address] ) 2019    Substance abuse ([place] [address] ) 2019    Urinary tract infection 2019    Visual impairment 2019     Past Surgical History:   Procedure Laterality Date    CARPAL TUNNEL RELEASE       SECTION      x3    ESOPHAGOGASTRODUODENOSCOPY N/A 2017    Procedure: ESOPHAGOGASTRODUODENOSCOPY (EGD); Surgeon: Eryn Wolff MD;  [facility];  Service: [address] IR IMAGE GUIDED ASPIRATION / DRAINAGE W TUBE  2018    NOSE SURGERY      PARAESOPHAGEAL HERNIA REPAIR N/A 2017    Procedure: REPAIR HERNIA PARAESOPHAGEAL  LAPAROSCOPIC;  Surgeon: Eryn Wolff MD;  [facility];  Service: Karlo Bachelor IA EGD TRANSORAL BIOPSY SINGLE/MULTIPLE N/A 2017    Procedure: ESOPHAGOGASTRODUODENOSCOPY (EGD) with biopsy;  Surgeon: Eryn Wolff MD;  [facility];   Service: Bariatrics    IA LAP GASTRIC BYPASS/MARCIN-EN-Y N/A 2017    Procedure: BYPASS GASTRIC  MARCIN-EN-Y LAPAROSCOPIC;  Surgeon: Eryn Wolff MD;  [facility];  Service: Bariatrics    IA REDUCTION OF LARGE BREAST Bilateral 2018    Procedure: REDUCTION MAMMOPLASTY BREAST;  Surgeon: Saud Hendrix MD;  [facility];  Service: Plastics    IA WRIST Colie Risen LIG Right 2017    Procedure: ENDOSCOPIC CARPAL TUNNEL RELEASE ;  Surgeon: Dorothy Schirmer, MD;  [facility];  Service: Orthopedics    IA WRIST Colie Risen LIG Left 2017    Procedure: ENDOSCOPIC CARPAL TUNNEL RELEASE;  Surgeon: Dorothy Schirmer, MD; [facility];  Service: Orthopedics    REDUCTION MAMMAPLASTY Bilateral 08/01/2018    TONSILLECTOMY      TUBAL LIGATION      WISDOM TOOTH EXTRACTION       Allergies   Allergen Reactions    Latex Itching    Nsaids      Bariatric surgery        Substance Abuse History:    Social History     Substance and Sexual Activity   Alcohol Use Yes    Frequency: 2-3 times a week    Drinks per session: 5 or 6    Comment: reports 5-6 beers or cocktails 2x's/wk     Social History     Substance and Sexual Activity   Drug Use No       Social History:    Social History     Socioeconomic History    Marital status: Single     Spouse name: Not on file    Number of children: Not on file    Years of education: Not on file    Highest education level: Not on file   Occupational History    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 [employer]   Social Needs    Financial resource strain: Not on file    Food insecurity:     Worry: Not on file     Inability: Not on file    Transportation needs:     Medical: Not on file     Non-medical: Not on file   Tobacco Use    Smoking status: Current Some Day Smoker     Types: Cigarettes    Smokeless tobacco: Never Used    Tobacco comment: 2-3 cigarettes/day   Substance and Sexual Activity    Alcohol use: Yes     Frequency: 2-3 times a week     Drinks per session: 5 or 6     Comment: reports 5-6 beers or cocktails 2x's/wk    Drug use: No    Sexual activity: Not on file   Lifestyle    Physical activity:     Days per week: Not on file     Minutes per session: Not on file    Stress: Not on file   Relationships    Social connections:     Talks on phone: Not on file     Gets together: Not on file     Attends Jain service: Not on file     Active member of club or organization: Not on file     Attends meetings of clubs or organizations: Not on file     Relationship status: Not on file    Intimate partner violence:     Fear of current or ex partner: Not on file Emotionally abused: Not on file     Physically abused: Not on file     Forced sexual activity: Not on file   Other Topics Concern    Not on file   Social History Narrative    Not on file       Family Psychiatric History:     Family History   Problem Relation Age of Onset    Diabetes Mother     Heart disease Mother     Stroke Mother         Syndrome    Heart disease Father     Hypertension Father     Kidney disease Father     Stroke Maternal Grandmother         Syndrome    Alzheimer's disease Maternal Grandfather     Arthritis Maternal Aunt     Diabetes Maternal Aunt     Hypertension Maternal Aunt     Breast cancer Maternal Aunt 46    Breast cancer additional onset Maternal Aunt 71                OBJECTIVE:     Vital signs in last 24 hours:    Vitals:    01/28/20 1028   BP: 109/71   BP Location: Left arm   Patient Position: Sitting   Cuff Size: Standard   Pulse: 61   Weight: 60 3 kg (133 lb)       Mental Status Evaluation:    Appearance age appropriate, casually dressed   Behavior pleasant, cooperative, calm, good eye contact   Speech normal rate, normal pitch, soft   Mood depressed, anxious   Affect mood-congruent   Thought Processes organized, goal directed   Associations circumstantial associations   Thought Content no overt delusions   Perceptual Disturbances: no auditory hallucinations, no visual hallucinations   Abnormal Thoughts  Risk Potential Suicidal ideation - None  Homicidal ideation - None  Potential for aggression - No   Orientation oriented to person, place, time/date and situation   Memory recent and remote memory grossly intact   Consciousness alert and awake   Attention Span Concentration Span attention span and concentration are age appropriate   Intellect appears to be of average intelligence   Insight fair   Judgement moderate   Muscle Strength and  Gait normal muscle strength and normal muscle tone, normal gait and normal balance   Motor activity no abnormal movements   Language no difficulty naming common objects, no difficulty repeating a phrase, no difficulty writing a sentence   Fund of Knowledge adequate knowledge of current events  adequate fund of knowledge regarding past history  adequate fund of knowledge regarding vocabulary    Pain mild   Pain Scale 0       Laboratory Results:   Most Recent Labs:   Lab Results   Component Value Date    WBC 4 89 11/08/2018    RBC 4 29 11/08/2018    HGB 13 6 11/08/2018    HCT 40 2 11/08/2018     11/08/2018    RDW 12 3 11/08/2018    NEUTROABS 2 41 11/08/2018     12/11/2015    K 4 6 11/08/2018     11/08/2018    CO2 31 11/08/2018    BUN 12 11/08/2018    CREATININE 0 72 11/08/2018    GLUCOSE 90 12/11/2015    CALCIUM 9 2 11/08/2018    AST 29 11/08/2018    ALT 38 11/08/2018    ALKPHOS 87 11/08/2018    PROT 7 5 12/11/2015    BILITOT 0 50 12/11/2015    CHOL 208 09/22/2014    HDL 46 04/21/2018    TRIG 99 04/21/2018    LDLCALC 91 04/21/2018    IAV6TWBEWNCU 1 280 04/07/2017    RPR Non-Reactive 04/08/2019     I have personally reviewed all pertinent laboratory/tests results  No results found for this or any previous visit  Assessment/Plan:       Diagnoses and all orders for this visit:    Moderate episode of recurrent major depressive disorder ([facility] )  -     Comprehensive metabolic panel; Future  -     CBC and differential; Future  -     HEMOGLOBIN A1C W/ EAG ESTIMATION; Future  -     TSH, 3rd generation with T4 reflex; Future  -     Lipid panel; Future  -     DULoxetine (CYMBALTA) 20 mg capsule; Take 1 capsule (20 mg) every other day x 6 days then stop medication  Last dose on Sat  2/1/20  -     escitalopram (LEXAPRO) 5 mg tablet; Take 1 tablet (5 mg total) by mouth daily    MIKE (generalized anxiety disorder)  -     escitalopram (LEXAPRO) 5 mg tablet;  Take 1 tablet (5 mg total) by mouth daily    Alcohol use    Other orders  -     NON FORMULARY          Treatment Recommendations/Precautions/Plan:    Patient has been educated about their diagnosis and treatment modalities  They voiced understanding and agreement with the following plan:    Continue Trazodone 50 mg PO QHS PRN for sleep disturbance as weakness is decreasing  Discontinue Cymbalta by decreasing to 20 mg every other day x 6 days then stopping medication as pt reports she does not like the medication with GI intolerance and also concern with possible long term alcohol use and Cymbalta use that this may alter liver function long term as Cymbalta is processed hapatically  Start Lexapro 5 mg PO QD for MDD and MIKE sxs     Ordered BW today CBC w/ diff, CMP, A1c, lipid panel, TSH for medication management and to check liver function with alcohol use    Medication management every 7 weeks  Continue psychotherapy with [facility] therapist Nasim Prado  Aware of need to follow up with family physician for medical issues    -Discussed self monitoring of symptoms, and symptom monitoring tools     -Patient has been informed of 24 hours and weekend coverage for urgent situations accessed by calling the main clinic phone number      -Completed and signed during the session: Not applicable - Treatment Plan to be completed by [facility] [address] therapist    Risks/Benefits      Risks, Benefits And Possible Side Effects Of Medications:    Risks, benefits, and possible side effects of medications explained to Flory including sedation, GI intolerance, dizziness, headaches, sexual dysfunction, increased nervousness or depression, risk of suicidality and serotonin syndrome related to treatment with antidepressants, risks and benefits of treatment with medications in pregnancy and risks and benefits of treatment with medications in lactation  She verbalizes understanding and agreement for treatment      Controlled Medication Discussion:     No recent records found for controlled prescriptions according to [place] Prescription Drug Monitoring Program    Psychotherapy Provided: Individual psychotherapy provided: Medication changes discussed with Elda Ortega  Medication education provided to Elda Ortega  Importance of medication and treatment compliance reviewed with Flory Yoder PA-C 01/28/20

## 2020-01-30 DIAGNOSIS — Z98.84 BARIATRIC SURGERY STATUS: ICD-10-CM

## 2020-01-30 RX ORDER — OMEPRAZOLE 20 MG/1
20 CAPSULE, DELAYED RELEASE ORAL DAILY
Qty: 30 CAPSULE | Refills: 0 | OUTPATIENT
Start: 2020-01-30 | End: 2020-02-29

## 2020-01-30 NOTE — TELEPHONE ENCOUNTER
Patient needs an appointment if she still needs omeprazole  She has not been seen since 2018  Please call and offer her a visit   CR

## 2020-02-25 ENCOUNTER — TELEPHONE (OUTPATIENT)
Dept: BARIATRICS | Facility: CLINIC | Age: 50
End: 2020-02-25

## 2020-02-25 NOTE — TELEPHONE ENCOUNTER
Patient submitted request for omeprazole left msg for patient saying that we would be unable to refill her rx at this time because we have not seen her since 2018 and in order to get that filled we would have to see her in the office

## 2020-02-29 DIAGNOSIS — Z98.84 BARIATRIC SURGERY STATUS: ICD-10-CM

## 2020-03-02 RX ORDER — OMEPRAZOLE 20 MG/1
20 CAPSULE, DELAYED RELEASE ORAL DAILY
Qty: 30 CAPSULE | Refills: 1 | OUTPATIENT
Start: 2020-03-02 | End: 2020-04-01

## 2020-03-02 NOTE — TELEPHONE ENCOUNTER
Patient has not been seen in our office for over one year-she was already advised by our staff that she needs an appointment if she still needs omeprazole  We would like to see her for an annual visit to see how she is doing overall as well  Please advise her of same  No refills until she Is evaluated  Thanks    HALIMA

## 2020-03-16 DIAGNOSIS — F41.1 GAD (GENERALIZED ANXIETY DISORDER): ICD-10-CM

## 2020-03-16 DIAGNOSIS — F33.1 MODERATE EPISODE OF RECURRENT MAJOR DEPRESSIVE DISORDER (HCC): ICD-10-CM

## 2020-03-16 RX ORDER — ESCITALOPRAM OXALATE 5 MG/1
5 TABLET ORAL DAILY
Qty: 30 TABLET | Refills: 0 | Status: SHIPPED | OUTPATIENT
Start: 2020-03-29 | End: 2020-04-10

## 2020-03-27 ENCOUNTER — DOCUMENTATION (OUTPATIENT)
Dept: PSYCHIATRY | Facility: CLINIC | Age: 50
End: 2020-03-27

## 2020-03-27 NOTE — PROGRESS NOTES
Treatment Plan not completed within required time limits due to: Bo Reagan  canceled appointment  on 3/31/2020

## 2020-04-06 ENCOUNTER — DOCUMENTATION (OUTPATIENT)
Dept: BEHAVIORAL/MENTAL HEALTH CLINIC | Facility: CLINIC | Age: 50
End: 2020-04-06

## 2020-04-06 DIAGNOSIS — F33.1 MODERATE EPISODE OF RECURRENT MAJOR DEPRESSIVE DISORDER (HCC): ICD-10-CM

## 2020-04-06 DIAGNOSIS — F41.1 GAD (GENERALIZED ANXIETY DISORDER): Primary | ICD-10-CM

## 2020-04-10 DIAGNOSIS — F41.1 GAD (GENERALIZED ANXIETY DISORDER): ICD-10-CM

## 2020-04-10 DIAGNOSIS — F33.1 MODERATE EPISODE OF RECURRENT MAJOR DEPRESSIVE DISORDER (HCC): ICD-10-CM

## 2020-04-10 RX ORDER — ESCITALOPRAM OXALATE 5 MG/1
5 TABLET ORAL DAILY
Qty: 30 TABLET | Refills: 0 | Status: SHIPPED | OUTPATIENT
Start: 2020-04-29 | End: 2020-04-24 | Stop reason: SDUPTHER

## 2020-04-24 ENCOUNTER — TELEMEDICINE (OUTPATIENT)
Dept: PSYCHIATRY | Facility: CLINIC | Age: 50
End: 2020-04-24
Payer: COMMERCIAL

## 2020-04-24 DIAGNOSIS — Z72.89 ALCOHOL USE: ICD-10-CM

## 2020-04-24 DIAGNOSIS — F33.1 MODERATE EPISODE OF RECURRENT MAJOR DEPRESSIVE DISORDER (HCC): Primary | ICD-10-CM

## 2020-04-24 DIAGNOSIS — F41.1 GAD (GENERALIZED ANXIETY DISORDER): ICD-10-CM

## 2020-04-24 PROCEDURE — 99214 OFFICE O/P EST MOD 30 MIN: CPT | Performed by: PHYSICIAN ASSISTANT

## 2020-04-24 RX ORDER — TRAZODONE HYDROCHLORIDE 100 MG/1
50 TABLET ORAL
Qty: 15 TABLET | Refills: 2
Start: 2020-04-24 | End: 2020-04-24 | Stop reason: SDUPTHER

## 2020-04-24 RX ORDER — TRAZODONE HYDROCHLORIDE 100 MG/1
50 TABLET ORAL
Qty: 15 TABLET | Refills: 2 | Status: SHIPPED | OUTPATIENT
Start: 2020-04-24 | End: 2020-06-22

## 2020-04-24 RX ORDER — ESCITALOPRAM OXALATE 10 MG/1
10 TABLET ORAL DAILY
Qty: 30 TABLET | Refills: 2 | Status: SHIPPED | OUTPATIENT
Start: 2020-04-29 | End: 2020-06-22

## 2020-05-06 DIAGNOSIS — F41.1 GAD (GENERALIZED ANXIETY DISORDER): ICD-10-CM

## 2020-05-06 DIAGNOSIS — F33.1 MODERATE EPISODE OF RECURRENT MAJOR DEPRESSIVE DISORDER (HCC): ICD-10-CM

## 2020-05-18 RX ORDER — ESCITALOPRAM OXALATE 5 MG/1
5 TABLET ORAL DAILY
Qty: 30 TABLET | Refills: 0 | OUTPATIENT
Start: 2020-05-18 | End: 2020-06-17

## 2020-06-22 ENCOUNTER — TELEMEDICINE (OUTPATIENT)
Dept: PSYCHIATRY | Facility: CLINIC | Age: 50
End: 2020-06-22
Payer: COMMERCIAL

## 2020-06-22 DIAGNOSIS — F10.10 ALCOHOL ABUSE: ICD-10-CM

## 2020-06-22 DIAGNOSIS — F41.1 GAD (GENERALIZED ANXIETY DISORDER): ICD-10-CM

## 2020-06-22 DIAGNOSIS — F33.1 MODERATE EPISODE OF RECURRENT MAJOR DEPRESSIVE DISORDER (HCC): Primary | ICD-10-CM

## 2020-06-22 PROCEDURE — 99215 OFFICE O/P EST HI 40 MIN: CPT | Performed by: PHYSICIAN ASSISTANT

## 2020-06-22 RX ORDER — FOLIC ACID 1 MG/1
1 TABLET ORAL DAILY
Qty: 30 TABLET | Refills: 2 | Status: SHIPPED | OUTPATIENT
Start: 2020-06-22 | End: 2021-01-27 | Stop reason: SDUPTHER

## 2020-06-22 RX ORDER — TRAZODONE HYDROCHLORIDE 100 MG/1
50 TABLET ORAL
Qty: 15 TABLET | Refills: 2 | Status: SHIPPED | OUTPATIENT
Start: 2020-06-22 | End: 2021-01-27 | Stop reason: SDUPTHER

## 2020-06-22 RX ORDER — ESCITALOPRAM OXALATE 10 MG/1
15 TABLET ORAL DAILY
Qty: 45 TABLET | Refills: 2 | Status: SHIPPED | OUTPATIENT
Start: 2020-06-22 | End: 2021-01-27 | Stop reason: SDUPTHER

## 2020-09-01 DIAGNOSIS — F33.1 MODERATE EPISODE OF RECURRENT MAJOR DEPRESSIVE DISORDER (HCC): ICD-10-CM

## 2020-09-01 DIAGNOSIS — F41.1 GAD (GENERALIZED ANXIETY DISORDER): ICD-10-CM

## 2020-09-01 RX ORDER — TRAZODONE HYDROCHLORIDE 100 MG/1
50 TABLET ORAL
Qty: 15 TABLET | Refills: 2 | OUTPATIENT
Start: 2020-09-01

## 2020-09-01 RX ORDER — ESCITALOPRAM OXALATE 10 MG/1
15 TABLET ORAL DAILY
Qty: 45 TABLET | Refills: 2 | OUTPATIENT
Start: 2020-09-01 | End: 2020-10-01

## 2020-10-28 ENCOUNTER — LAB (OUTPATIENT)
Dept: LAB | Facility: HOSPITAL | Age: 50
End: 2020-10-28
Payer: COMMERCIAL

## 2020-10-28 ENCOUNTER — TRANSCRIBE ORDERS (OUTPATIENT)
Dept: LAB | Facility: HOSPITAL | Age: 50
End: 2020-10-28

## 2020-10-28 DIAGNOSIS — F10.10 ALCOHOL ABUSE: ICD-10-CM

## 2020-10-28 DIAGNOSIS — F33.1 MODERATE EPISODE OF RECURRENT MAJOR DEPRESSIVE DISORDER (HCC): ICD-10-CM

## 2020-10-28 LAB
ALBUMIN SERPL BCP-MCNC: 3.5 G/DL (ref 3.5–5)
ALP SERPL-CCNC: 96 U/L (ref 46–116)
ALT SERPL W P-5'-P-CCNC: 36 U/L (ref 12–78)
ANION GAP SERPL CALCULATED.3IONS-SCNC: 4 MMOL/L (ref 4–13)
AST SERPL W P-5'-P-CCNC: 35 U/L (ref 5–45)
BASOPHILS # BLD AUTO: 0.03 THOUSANDS/ΜL (ref 0–0.1)
BASOPHILS NFR BLD AUTO: 1 % (ref 0–1)
BILIRUB SERPL-MCNC: 0.52 MG/DL (ref 0.2–1)
BUN SERPL-MCNC: 14 MG/DL (ref 5–25)
CALCIUM SERPL-MCNC: 8.8 MG/DL (ref 8.3–10.1)
CHLORIDE SERPL-SCNC: 102 MMOL/L (ref 100–108)
CHOLEST SERPL-MCNC: 166 MG/DL (ref 50–200)
CO2 SERPL-SCNC: 30 MMOL/L (ref 21–32)
CREAT SERPL-MCNC: 0.58 MG/DL (ref 0.6–1.3)
EOSINOPHIL # BLD AUTO: 0.1 THOUSAND/ΜL (ref 0–0.61)
EOSINOPHIL NFR BLD AUTO: 2 % (ref 0–6)
ERYTHROCYTE [DISTWIDTH] IN BLOOD BY AUTOMATED COUNT: 12 % (ref 11.6–15.1)
EST. AVERAGE GLUCOSE BLD GHB EST-MCNC: 100 MG/DL
FOLATE SERPL-MCNC: 13 NG/ML (ref 3.1–17.5)
GFR SERPL CREATININE-BSD FRML MDRD: 109 ML/MIN/1.73SQ M
GLUCOSE P FAST SERPL-MCNC: 91 MG/DL (ref 65–99)
HBA1C MFR BLD: 5.1 %
HCT VFR BLD AUTO: 36.2 % (ref 34.8–46.1)
HDLC SERPL-MCNC: 70 MG/DL
HGB BLD-MCNC: 12.3 G/DL (ref 11.5–15.4)
IMM GRANULOCYTES # BLD AUTO: 0.01 THOUSAND/UL (ref 0–0.2)
IMM GRANULOCYTES NFR BLD AUTO: 0 % (ref 0–2)
LDLC SERPL CALC-MCNC: 82 MG/DL (ref 0–100)
LYMPHOCYTES # BLD AUTO: 1.45 THOUSANDS/ΜL (ref 0.6–4.47)
LYMPHOCYTES NFR BLD AUTO: 25 % (ref 14–44)
MCH RBC QN AUTO: 33.1 PG (ref 26.8–34.3)
MCHC RBC AUTO-ENTMCNC: 34 G/DL (ref 31.4–37.4)
MCV RBC AUTO: 97 FL (ref 82–98)
MONOCYTES # BLD AUTO: 0.57 THOUSAND/ΜL (ref 0.17–1.22)
MONOCYTES NFR BLD AUTO: 10 % (ref 4–12)
NEUTROPHILS # BLD AUTO: 3.74 THOUSANDS/ΜL (ref 1.85–7.62)
NEUTS SEG NFR BLD AUTO: 62 % (ref 43–75)
NONHDLC SERPL-MCNC: 96 MG/DL
NRBC BLD AUTO-RTO: 0 /100 WBCS
PLATELET # BLD AUTO: 199 THOUSANDS/UL (ref 149–390)
PMV BLD AUTO: 10.1 FL (ref 8.9–12.7)
POTASSIUM SERPL-SCNC: 3.7 MMOL/L (ref 3.5–5.3)
PROT SERPL-MCNC: 6.8 G/DL (ref 6.4–8.2)
RBC # BLD AUTO: 3.72 MILLION/UL (ref 3.81–5.12)
SODIUM SERPL-SCNC: 136 MMOL/L (ref 136–145)
TRIGL SERPL-MCNC: 69 MG/DL
TSH SERPL DL<=0.05 MIU/L-ACNC: 1.04 UIU/ML (ref 0.36–3.74)
VIT B12 SERPL-MCNC: 622 PG/ML (ref 100–900)
WBC # BLD AUTO: 5.9 THOUSAND/UL (ref 4.31–10.16)

## 2020-10-28 PROCEDURE — 84443 ASSAY THYROID STIM HORMONE: CPT

## 2020-10-28 PROCEDURE — 83036 HEMOGLOBIN GLYCOSYLATED A1C: CPT

## 2020-10-28 PROCEDURE — 36415 COLL VENOUS BLD VENIPUNCTURE: CPT

## 2020-10-28 PROCEDURE — 80061 LIPID PANEL: CPT

## 2020-10-28 PROCEDURE — 82607 VITAMIN B-12: CPT

## 2020-10-28 PROCEDURE — 85025 COMPLETE CBC W/AUTO DIFF WBC: CPT

## 2020-10-28 PROCEDURE — 82746 ASSAY OF FOLIC ACID SERUM: CPT

## 2020-10-28 PROCEDURE — 80053 COMPREHEN METABOLIC PANEL: CPT

## 2020-11-06 PROBLEM — Z48.815 ENCOUNTER FOR SURGICAL AFTERCARE FOLLOWING SURGERY OF DIGESTIVE SYSTEM: Status: ACTIVE | Noted: 2020-11-06

## 2020-11-13 ENCOUNTER — OFFICE VISIT (OUTPATIENT)
Dept: FAMILY MEDICINE CLINIC | Facility: CLINIC | Age: 50
End: 2020-11-13

## 2020-11-13 VITALS
BODY MASS INDEX: 24.73 KG/M2 | DIASTOLIC BLOOD PRESSURE: 62 MMHG | HEIGHT: 61 IN | RESPIRATION RATE: 16 BRPM | WEIGHT: 131 LBS | HEART RATE: 82 BPM | TEMPERATURE: 96.1 F | SYSTOLIC BLOOD PRESSURE: 104 MMHG

## 2020-11-13 DIAGNOSIS — R21 RASH IN ADULT: ICD-10-CM

## 2020-11-13 DIAGNOSIS — Z72.0 TOBACCO ABUSE: ICD-10-CM

## 2020-11-13 DIAGNOSIS — M54.9 MID BACK PAIN: ICD-10-CM

## 2020-11-13 DIAGNOSIS — Z23 ENCOUNTER FOR IMMUNIZATION: ICD-10-CM

## 2020-11-13 DIAGNOSIS — Z12.31 ENCOUNTER FOR SCREENING MAMMOGRAM FOR BREAST CANCER: ICD-10-CM

## 2020-11-13 DIAGNOSIS — F10.10 ALCOHOL ABUSE: ICD-10-CM

## 2020-11-13 DIAGNOSIS — Z00.00 ANNUAL PHYSICAL EXAM: Primary | ICD-10-CM

## 2020-11-13 PROCEDURE — G0008 ADMIN INFLUENZA VIRUS VAC: HCPCS | Performed by: FAMILY MEDICINE

## 2020-11-13 PROCEDURE — 90686 IIV4 VACC NO PRSV 0.5 ML IM: CPT | Performed by: FAMILY MEDICINE

## 2020-11-13 PROCEDURE — 99396 PREV VISIT EST AGE 40-64: CPT | Performed by: FAMILY MEDICINE

## 2020-11-13 PROCEDURE — 90732 PPSV23 VACC 2 YRS+ SUBQ/IM: CPT | Performed by: FAMILY MEDICINE

## 2020-11-13 PROCEDURE — G0009 ADMIN PNEUMOCOCCAL VACCINE: HCPCS | Performed by: FAMILY MEDICINE

## 2020-11-13 PROCEDURE — 4004F PT TOBACCO SCREEN RCVD TLK: CPT | Performed by: FAMILY MEDICINE

## 2020-11-13 RX ORDER — NYSTATIN 100000 U/G
CREAM TOPICAL 2 TIMES DAILY
Qty: 30 G | Refills: 0 | Status: SHIPPED | OUTPATIENT
Start: 2020-11-13 | End: 2020-11-30

## 2020-11-13 RX ORDER — NALTREXONE HYDROCHLORIDE 50 MG/1
50 TABLET, FILM COATED ORAL DAILY
Qty: 30 TABLET | Refills: 0 | Status: SHIPPED | OUTPATIENT
Start: 2020-11-13 | End: 2020-12-08 | Stop reason: SDUPTHER

## 2020-11-13 RX ORDER — NYSTATIN 100000 U/G
CREAM TOPICAL 2 TIMES DAILY
Qty: 30 G | Refills: 0 | Status: CANCELLED | OUTPATIENT
Start: 2020-11-13

## 2020-11-13 RX ORDER — CLOTRIMAZOLE 1 %
CREAM (GRAM) TOPICAL 2 TIMES DAILY
Qty: 30 G | Refills: 0 | Status: SHIPPED | OUTPATIENT
Start: 2020-11-13 | End: 2020-11-13 | Stop reason: CLARIF

## 2020-11-20 ENCOUNTER — TELEPHONE (OUTPATIENT)
Dept: PSYCHIATRY | Facility: CLINIC | Age: 50
End: 2020-11-20

## 2020-11-30 ENCOUNTER — TRANSCRIBE ORDERS (OUTPATIENT)
Dept: ADMINISTRATIVE | Facility: HOSPITAL | Age: 50
End: 2020-11-30

## 2020-11-30 ENCOUNTER — OFFICE VISIT (OUTPATIENT)
Dept: FAMILY MEDICINE CLINIC | Facility: CLINIC | Age: 50
End: 2020-11-30

## 2020-11-30 ENCOUNTER — TELEPHONE (OUTPATIENT)
Dept: PSYCHIATRY | Facility: CLINIC | Age: 50
End: 2020-11-30

## 2020-11-30 VITALS
TEMPERATURE: 96.4 F | HEIGHT: 61 IN | HEART RATE: 74 BPM | RESPIRATION RATE: 14 BRPM | DIASTOLIC BLOOD PRESSURE: 70 MMHG | WEIGHT: 133.4 LBS | SYSTOLIC BLOOD PRESSURE: 114 MMHG | BODY MASS INDEX: 25.19 KG/M2

## 2020-11-30 DIAGNOSIS — R21 RASH IN ADULT: ICD-10-CM

## 2020-11-30 DIAGNOSIS — Z12.31 ENCOUNTER FOR SCREENING MAMMOGRAM FOR MALIGNANT NEOPLASM OF BREAST: Primary | ICD-10-CM

## 2020-11-30 PROCEDURE — 99213 OFFICE O/P EST LOW 20 MIN: CPT | Performed by: FAMILY MEDICINE

## 2020-11-30 PROCEDURE — 3008F BODY MASS INDEX DOCD: CPT | Performed by: FAMILY MEDICINE

## 2020-11-30 RX ORDER — NYSTATIN 100000 U/G
CREAM TOPICAL 2 TIMES DAILY
Qty: 30 G | Refills: 1 | Status: SHIPPED | OUTPATIENT
Start: 2020-11-30

## 2020-12-08 DIAGNOSIS — F10.10 ALCOHOL ABUSE: ICD-10-CM

## 2020-12-09 RX ORDER — NALTREXONE HYDROCHLORIDE 50 MG/1
50 TABLET, FILM COATED ORAL DAILY
Qty: 30 TABLET | Refills: 2 | Status: SHIPPED | OUTPATIENT
Start: 2020-12-09 | End: 2021-01-25 | Stop reason: SDUPTHER

## 2020-12-30 ENCOUNTER — TELEPHONE (OUTPATIENT)
Dept: PSYCHIATRY | Facility: CLINIC | Age: 50
End: 2020-12-30
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
## 2021-01-25 DIAGNOSIS — F10.10 ALCOHOL ABUSE: ICD-10-CM

## 2021-01-25 RX ORDER — NALTREXONE HYDROCHLORIDE 50 MG/1
50 TABLET, FILM COATED ORAL DAILY
Qty: 30 TABLET | Refills: 2 | Status: SHIPPED | OUTPATIENT
Start: 2021-01-25 | End: 2021-04-05

## 2021-01-27 ENCOUNTER — TELEMEDICINE (OUTPATIENT)
Dept: PSYCHIATRY | Facility: CLINIC | Age: 51
End: 2021-01-27
Payer: COMMERCIAL

## 2021-01-27 DIAGNOSIS — F41.1 GAD (GENERALIZED ANXIETY DISORDER): ICD-10-CM

## 2021-01-27 DIAGNOSIS — F10.10 ALCOHOL ABUSE: ICD-10-CM

## 2021-01-27 DIAGNOSIS — F33.1 MODERATE EPISODE OF RECURRENT MAJOR DEPRESSIVE DISORDER (HCC): Primary | ICD-10-CM

## 2021-01-27 PROBLEM — F10.90 ALCOHOL USE: Status: RESOLVED | Noted: 2018-11-20 | Resolved: 2021-01-27

## 2021-01-27 PROBLEM — Z72.89 ALCOHOL USE: Status: RESOLVED | Noted: 2018-11-20 | Resolved: 2021-01-27

## 2021-01-27 PROBLEM — Z78.9 ALCOHOL USE: Status: RESOLVED | Noted: 2018-11-20 | Resolved: 2021-01-27

## 2021-01-27 PROCEDURE — 99214 OFFICE O/P EST MOD 30 MIN: CPT | Performed by: PHYSICIAN ASSISTANT

## 2021-01-27 PROCEDURE — 4004F PT TOBACCO SCREEN RCVD TLK: CPT | Performed by: PHYSICIAN ASSISTANT

## 2021-01-27 RX ORDER — ESCITALOPRAM OXALATE 10 MG/1
15 TABLET ORAL DAILY
Qty: 45 TABLET | Refills: 2 | Status: SHIPPED | OUTPATIENT
Start: 2021-01-27 | End: 2021-04-09

## 2021-01-27 RX ORDER — FOLIC ACID 1 MG/1
1 TABLET ORAL DAILY
Qty: 30 TABLET | Refills: 2 | Status: SHIPPED | OUTPATIENT
Start: 2021-01-27 | End: 2021-04-12

## 2021-01-27 RX ORDER — TRAZODONE HYDROCHLORIDE 100 MG/1
50 TABLET ORAL
Qty: 15 TABLET | Refills: 2 | Status: SHIPPED | OUTPATIENT
Start: 2021-01-27 | End: 2021-04-09

## 2021-01-27 NOTE — BH TREATMENT PLAN
TREATMENT PLAN (Medication Management Only)        [facility]    Name and Date of Birth:  Alana Sanches 53 y o  1970  Date of Treatment Plan: January 27, 2021  Diagnosis/Diagnoses:    1  Moderate episode of recurrent major depressive disorder ([address] )    2  MIKE (generalized anxiety disorder)    3  Alcohol abuse      Strengths/Personal Resources for Self-Care: "I try to see motivation videos; listening to music; Walking when weather permits"  Area/Areas of need (in own words): "Always have my days, but I'm feeling kind of good "  1  Long Term Goal: Maintain mood stability and anxiety control  Achieve sobriety  Target Date:3-6 months  Person/Persons responsible for completion of goal: [place]  2  Short Term Objective (s) - How will we reach this goal?:   A  Provider new recommended medication/dosage changes and/or continue medication(s): Pt is having moderate anxiety, depression in the setting of medication noncompliance  I will restart Escitalopram and explained the importance of compliance to achieve maximum benefit from her medicines  ETOH abuse is occurring--though the use has reduced with the help of Naltrexone which was prescribed by her PCP  Pt advised to take Naltrexone with a meal due to mild SE  I strongly recommended a dual program and gave resources: ie MARS and [facility]--whole sheet of resources is being mailed to Pt  I recommended individual psychotherapy and gave resources of [employer] and [email or web address]   Treatment plan done and Pt accepts the plan     Restart:  Escitalopram 10mg (1 1/2) tab po qd # 45 R2  Trazodone 100mg (1/2) tab po qhs prn insomnia # 15 R2  Folate 1mg (1) tab po qd # 90  Pt takes a Vitamin with B1 included  Melatonin 10mg qhs prn insomnia--Pt gets OTC  Continue:  Naltrexone 50mg (1) tab po qd--per PCP and was renewed 1/25/2021 with 2 refills  Recheck LFTs  Return 8 weeks, call sooner prn  Pt declines to have a copy of her Tx plan mailed to her--she has a Mychart acct  Target Date:3-6 months  Person/Persons Responsible for Completion of Goal: Nava Ruth  Progress Towards Goals: stable, continuing treatment  Treatment Modality: Medication mgt and referral for ETOH abuse Tx and psychotherapy  Review due 180 days from date of this plan: 4-6 months  Expected length of service: ongoing treatment  My Physician/PA/NP and I have developed this plan together and I agree to work on the goals and objectives  I understand the treatment goals that were developed for my treatment

## 2021-02-09 ENCOUNTER — TELEPHONE (OUTPATIENT)
Dept: PSYCHIATRY | Facility: CLINIC | Age: 51
End: 2021-02-09

## 2021-03-01 ENCOUNTER — OFFICE VISIT (OUTPATIENT)
Dept: FAMILY MEDICINE CLINIC | Facility: CLINIC | Age: 51
End: 2021-03-01

## 2021-03-01 VITALS
OXYGEN SATURATION: 98 % | HEART RATE: 63 BPM | WEIGHT: 138 LBS | TEMPERATURE: 97.2 F | DIASTOLIC BLOOD PRESSURE: 60 MMHG | RESPIRATION RATE: 18 BRPM | BODY MASS INDEX: 26.06 KG/M2 | HEIGHT: 61 IN | SYSTOLIC BLOOD PRESSURE: 110 MMHG

## 2021-03-01 DIAGNOSIS — Z98.84 S/P BARIATRIC SURGERY: Primary | ICD-10-CM

## 2021-03-01 DIAGNOSIS — R21 RASH IN ADULT: ICD-10-CM

## 2021-03-01 PROCEDURE — 99213 OFFICE O/P EST LOW 20 MIN: CPT | Performed by: FAMILY MEDICINE

## 2021-03-01 PROCEDURE — 3008F BODY MASS INDEX DOCD: CPT | Performed by: FAMILY MEDICINE

## 2021-03-01 NOTE — ASSESSMENT & PLAN NOTE
Using nystatin cream without any improvement in the last 2 months  Breast located on right lower quadrant  Rash not getting worse or expanding  No concerns for cellulitis today  Rash likely frictional and non infectious  Recommend Vaseline as barrier precaution as well as using soft cloth or gauze in between folds to avoid friction   Will refer to Plastic surgery per patient's request to fix excessive skin folds post bariatric surgery

## 2021-03-01 NOTE — PROGRESS NOTES
Assessment/Plan:    Rash in adult  Using nystatin cream without any improvement in the last 2 months  Rash  located on right lower quadrant  Rash not getting worse or expanding  No concerns for cellulitis today  Rash likely frictional and non infectious  Recommend Vaseline as barrier precaution as well as using soft cloth or gauze in between folds to avoid friction   Will refer to Plastic surgery per patient's request to fix excessive skin folds post bariatric surgery  Return precautions discussed  Diagnoses and all orders for this visit:    S/P bariatric surgery  -     Ambulatory referral to Plastic Surgery; Future    Rash in adult          Subjective:      Patient ID: Sasha Garcia is a 49 y o  female  HPI She is here for follow-up of a abdominal rash for around 3 months duration  She has been applying nystatin cream 2-3 times daily for a presumed fungal rash  The rash does not seem to be improving with the cream but is not gotten any worse  She denies rash increasing in size  It is nonpainful  Denies any nausea vomiting fever or chills  No discharge from rash  She had bariatric surgery in 2017 and is concerned that there is loose skin around the lower abdominal area  The following portions of the patient's history were reviewed and updated as appropriate: allergies, current medications, past family history, past medical history, past social history, past surgical history and problem list     Review of Systems   Constitutional: Negative for chills and fever  Respiratory: Negative for shortness of breath  Cardiovascular: Negative for chest pain and palpitations  Gastrointestinal: Negative for abdominal pain, diarrhea, nausea and vomiting  Skin: Positive for rash  Neurological: Negative for dizziness  All other systems reviewed and are negative          Objective:      /60 (BP Location: Left arm, Patient Position: Sitting, Cuff Size: Large)   Pulse 63   Temp (!) 97 2 °F (36 2 °C) (Tympanic)   Resp 18   Ht 5' 1" (1 549 m)   Wt 62 6 kg (138 lb)   LMP 03/14/2018 (Approximate)   SpO2 98%   BMI 26 07 kg/m²          Physical Exam  Vitals signs and nursing note reviewed  Constitutional:       General: She is not in acute distress  Appearance: Normal appearance  She is not ill-appearing, toxic-appearing or diaphoretic  Cardiovascular:      Rate and Rhythm: Normal rate and regular rhythm  Pulses: Normal pulses  Heart sounds: Normal heart sounds  Skin:     Comments: Rash on RLQ between abdominal skin folds  Not bright red no scaling noticed  No warmth or tenderness to palpation  Has clean  borders  Neurological:      Mental Status: She is alert

## 2021-03-15 ENCOUNTER — TELEMEDICINE (OUTPATIENT)
Dept: FAMILY MEDICINE CLINIC | Facility: CLINIC | Age: 51
End: 2021-03-15

## 2021-03-15 DIAGNOSIS — U07.1 COVID-19: Primary | ICD-10-CM

## 2021-03-15 PROCEDURE — 4004F PT TOBACCO SCREEN RCVD TLK: CPT | Performed by: FAMILY MEDICINE

## 2021-03-15 PROCEDURE — 99213 OFFICE O/P EST LOW 20 MIN: CPT | Performed by: FAMILY MEDICINE

## 2021-03-15 RX ORDER — BENZONATATE 200 MG/1
200 CAPSULE ORAL 3 TIMES DAILY PRN
Qty: 20 CAPSULE | Refills: 0 | Status: SHIPPED | OUTPATIENT
Start: 2021-03-15

## 2021-03-15 NOTE — PATIENT INSTRUCTIONS
La COVID-19 et les problèmes de santé chroniques   CE QUE VOUS DEVEZ SAVOIR :   Votre maladie chronique peut augmenter le risque de COVID-19 ou de problèmes graves quchriss peut causer, comme la pneumonie  Les fournisseurs de soins de santé pourraient avoir besoin dapporter heriberto changements se affectent la manière dont vous gérez habituellement elvia problèmes de santé chroniques  Ils peuvent changer les heures douverture ou ne pas faire venir les patients en consultation  Il se peut que vous ne soyez pas en mesure de prendre rendez-vous pour [employer] de sang ou pour [employer] tests ou heriberto procédures  Elvin peut continuer jusquà ce que le virus se cause la COVID-19 soit maîtrisé  Douglas Fredo, vous pouvez prendre heriberto IAC/InterActiveCorp gérer [employer]  Josh mesures réduiront également votre risque de contracter la COVID-19 ou les problèmes graves quchriss provoque  CONSIGNES DE SORTIE :   Si vous pensez être infecté par Allstate coronavirus, procédez comme suit pour Faye autres :  · Si heriberto [employer] nécessaires, informez lopérateur de la possibilité dinfection, ou appelez à lavance et prévenez le service durgence  · Communiquez avec un fournisseur de soins de santé pour ViClone [facility] [employer] Company instructions si les symptômes sont bénins  Ne vous déplacez pas  sans fili appeler  Votre fournisseur de Baltazar [employer] silver protéger les [employer] personnel et les autres patients  · Couvrez-vous la bouche et le ricky pendant les [facility]  Elvin permet de minimiser les risques de transmission à dautres personnes  Appelez votre numéro durgence local (911 aux [place]) ou service durgence si :  · Vous avez du mal à respirer ou vous êtes essoufflé  · Vous ressentez une douleur ou une pression thoracique kulwant plus de 5 minutes  · Vous devenez confus ou vous avez heriberto difficultés à James Lowing  · Elvia lèvres ou votre visage bleuissent      · Vous avez une fièvre de 104°°F (40°°C) ou plus  Appelez votre médecin ou elvia fournisseurs de soins de santé si :  · Vous ne présentez pas  de symptômes de la COVID-19, mais vous avez eu un contact physique rapproché dans les 14 jours avec quelquun se a été testé positif  · Vous avez heriberto questions ou heriberto préoccupations concernant votre COVID-19 ou votre maladie chronique  Ce se suit peut augmenter votre risque de développer heriberto effets graves en raison de la COVID-19 :  · Être âgé de 72 ans ou plus    · Delilah Hoguet, cancer ou une IAC/InterActiveCorp    · Insuffisance rénale, maladie rénale chronique ou drépanocytose    · Wedron pulmonaire, BPCO, asthme modéré à sévère, mucoviscidose ou fibrose pulmonaire (cicatrices dans les poumons)    · [employer] cardiovasculaire grave, telle quune coronaropathie ou une insuffisance cardiaque    · Un trouble ou [employer] heriberto vaisseaux sanguins du cerveau, ou une affection telle que la démence    · Vivre dans une branden de retraite ou un établissement de soins de longue durée    · Fumer heriberto cigarettes    · Hypertension (pression artérielle élevée) ou thalassémie    · Un problème du système immunitaire, le VIH ou sida, ou une greffe de sang ou de moelle osseuse    · Utilisation à long terme de certains médicaments, tels que les corticostéroïdes    · Elesa Loupe    Gérez elvia problèmes de santé chroniques pendant cette période : Si vous navez pas de prestataire de soins de santé régulier, les experts vous recommandent de contacter un centre [id number] local ou un service de santé  Les éléments suivants peuvent vous aider à gérer votre état et à prévenir la COVID-19 :  · Ara Shell heriberto soins durgence pour votre état si nécessaire  Discutez avec elvia fournisseurs de soins de [employer] heriberto symptômes de votre maladie chronique se nécessitent heriberto soins immédiats  Ils peuvent vous aider à mettre au point un programme ou à ajouter la prise en charge heriberto exacerbations à votre programme   Le programme indiquera quand vous devez vous rendre à un service durgence et quand appeler votre numéro durgence local  Elvin dépendra de lendroit où vous vivez et heriberto services se sont disponibles pendant cette période  · Présentez-vous à elvia rendez-vous de dialyse comme prévu  Il est important de respecter le calendrier  Vous devrez avoir suffisamment de nourriture pour Dany Company de suivre le régime alimentaire durgence si vous devez [employer]  Le régime alimentaire durgence doit faire partie du programme de prise en charge de [employer]  · Reprogrammez tous les rendez-vous à venir au besoin  Les Safeway Inc peuvent être fermés jusquà ce que le nouveau coronavirus soit [facility]  Elvin signifie que vous devrez peut-être  une opération, une procédure ou un rendez-vous de [place]  Si vous ne pouvez pas avoir un rendez-vous par téléphone ou par vidéo, vous devrez prendre un nouveau rendez-vous  Certains fournisseurs de soins de santé peuvent fixer heriberto rendez-vous plusieurs mois à lavance  Certaines interventions chirurgicales et procédures se dérouleront comme prévu  Elvin dépend de létat de santé et de la raison de lopération ou de la procédure  Vous devrez peut-être subir heriberto tests supplémentaires pour la COVID-19 plusieurs jours wilian  · [place] tout programme de prise en charge que vous utilisez habituellement  Votre fournisseur de soins de santé vous dira si vous devez apporter heriberto modifications à votre programme de prise en charge habituel  Par exemple, si vous souffrez dasthme, continuez à suivre votre plan daction pour lasthme  Si vous êtes diabétique, vous devrez peut-être vérifier votre glycémie plus souvent  Le stress et la [employer] faire augmenter la glycémie Vous devrez peut-être ajuster heriberto médicaments comme linsuline   Si vous avez un problème cardiaque ou de lhypertension artérielle, vous devrez peut-être vérifier votre tension artérielle plus souvent  Le stress et la [employer] également augmenter votre tension artérielle  · Discutez de elvia médicaments avec elvia fournisseurs de soins de [place]  Vous pouvez obtenir plus dun mois de médicaments à la fois  Elvin réduira le nombre de fois où vous devez vous rendre dans une pharmacie pour obtenir elvia médicaments  Assurez-vous davoir suffisamment de médicaments si vous souffrez dune maladie se peut conduire à une situation durgence  Il peut sagir par exemple de médicaments contre lasthme, dinsuline ou dun stylo dépinéphrine  Vérifiez les dates dexpiration heriberto médicaments que vous avez actuellement  Demandez heriberto renouvellements dès que possible, si nécessaire  Si ce nest pas le moment de renouveler elvia ordonnances, vous pouvez obtenir un approvisionnement durgence de certains médicaments  Les programmes de médicaments varient, alors demandez les options possibles à votre fournisseur de soins de santé ou à votre pharmacien  · Cristian Floras heriberto fournitures en stock chez vous  Si possible, obtenez heriberto fournitures supplémentaires de ce que vous utilisez régulièrement  Il peut sagir par exemple de tampons absorbants, de seringues et de solutions de [facility]  Elvin limitera le nombre de déplacements en dehors de Branden Caves vous pour [facility] fournitures  · Jasiel reconnaître les signes et les symptômes de la COVID-19  Les signes et symptômes commencent généralement environ 5 jours après linfection, mais peuvent prendre de 2 à 15 jours  Les signes et les symptômes peuvent être légers à graves  Vous pouvez avoir limpression davoir la grippe ou un mauvais rhume  Elvia problèmes de santé chroniques peuvent provoquer heriberto symptômes identiques à ceux provoqués par la COVID-19  Il est donc parfois difficile de savoir si un symptôme provient de la COVID-19 ou de [employer]  [place] tout symptôme nouveau ou se saggrave   Karin est particulièrement important si vous avez [employer] Wholes[employer] provoque souvent un essoufflement  Votre fournisseur de Colgate vous dire si vous devez être testé pour la COVID-19  Toutes les données ne sont pas encore connues  Si vous pensez avoir été infecté mais que vous présentez heriberto signes ou heriberto symptômes se ne figurent pas dans la liste ci-dessous, dites-le à votre prestataire de soins :    ? Toux    ? Essoufflement ou difficulté à respirer susceptible de saggraver    ? Une fièvre dau moins 38 °C (peut être moins élevée chez les adultes de 65 ans ou plus)    ? Frissons pouvant être accompagnés de tremblements    ? Douleurs musculaires, douleurs physiques ou maux de tête    ? Mal de gorge    ? Perte soudaine du goût ou de lodorat    ? Fatigue intense    ? Congestion (tête pleine et ricky bouché) ou ricky se coule    ? Diarrhée, nausées ou vomissements    Ce que vous pouvez faire pour [employer]gate davoir à sortir de chez vous pendant cette période :  · [employer] à votre fournisseur de soins de santé de vous proposer dautres moyens davoir heriberto rendez-vous  Vous pourrez peut-être obtenir heriberto rendez-vous sans avoir à vous déplacer dans son cabinet  Certains fournisseurs proposent heriberto rendez-vous par téléphone, par vidéo ou par [employer]  · Faites-vous livrer de la nourriture, heriberto médicaments et dautres fournitures  Certaines pharmacies peuvent vous envoyer heriberto médicaments par la poste  Les épiceries et Man[employer] Inc peuvent vous livrer de la nourriture et dautres articles  Si possible, faites en sorte que les articles soient déposés quelque part  Évitez tout contact direct avec le livreur  En navi de contact rapproché, le virus peut se propager [employer]  Lavez-vous les trip après avoir rangé les articles livrés  · Demandez à quelquun daller chercher les articles dont vous avez besoin  La personne peut récupérer heriberto courses, heriberto médicaments ou dautres articles nécessaires pour vous   Choisissez une personne se na pas de signes ou de symptômes de la COVID-19 ou se a été [employer]  La personne ne doit pas être en attente de [place] de tests  Chriss ne doit pas souffrir dune [employer] [place]  [employer] risque de COVID-19 ou de problèmes graves quchriss provoque  Réduisez votre risque de COVID-19 : [place] virus se cause la COVID-19 se propage rapidement et facilement  Il se transmet principalement par les gouttelettes se se forment lorsquune personne parle, tousse ou éternue  Une personne infectée peut également laisser le virus tramaine heriberto objets et heriberto surfaces  Une autre personne peut contracter ce virus tramaine les trip en touchant lobjet ou la surface  Linfection se produit si la personne se touche ensuite les yeux ou la C[employer] trip non lavées  La meilleure façon de prévenir linfection est déviter toute personne se est infectée, mais stephane peut être difficile à faire  Une personne infectée peut transmettre le virus wilian lapparition heriberto signes ou heriberto symptômes, ou même si les signes ou les symptômes ne se développent [facility]  Voici les [facility] de prévenir la propagation du virus et de réduire votre risque de COVID-19 :      · Lavez-vous souvent les trip tout au long de la journée  Utilisez du irene et de leau  Frottez elvia trip [employer] irene, sans oublier les doigts  Lavez à la David [employer]gen Company creux [employer] de [facility], et [employer]  Utilisez les doigts [facility] pour frotter Ray[employer]on ongles de lautre  Lavez-vous les trip pendant au moins 20 secondes  Rincez à leau chaude pendant plusieurs secondes  Séchez-vous ensuite les trip à laide dune serviette propre ou dune serviette en papier  Utilisez un désinfectant pour les trip contenant de lalcool si vous navez pas accès à de leau et du irene  Ne vous touchez pas les yeux, le ricky ou la bouche si vous ne vous êtes pas lavé les trip  Expliquez aux enfants comment se laver les trip  · Couvrez-vous le ricky et la bouche lorsque vous [place] Ruperto ou toussez   Détournez votre visage et couvrez votre bouche et votre ricky avec un mouchoir  Susanne Natter le mouchoir après utilisation  Utilisez votre coude en labsence de mouchoir  Ensuite, lavez-vous sujey les trip [place] de [facility] et du irene ou utilisez un désinfectant [facility]  Tournez la tête et couvrez votre visage si quelquun près de vous tousse ou éternue  Expliquez aux enfants comment se couvrir le ricky et la bouche lorsquils toussent ou éternuent  Rappelez-leur de se laver les trip  · Prenez lhabitude de ne pas toucher votre visage  Si vous avez le virus tramaine les trip, vous pouvez le transférer à elvia yeux, votre ricky ou votre bouche et être infecté  · Nettoyez et désinfectez souvent les surfaces et les objets souvent touchés de votre branden  Faites-le régulièrement même si vous pensez que personne habitant ou entrant Di Mare vous nest infecté par le virus  Les surfaces comprennent les plans de travail, les jose enrique de placard, les bureaux, les poignées, les trip courantes, les poignées de porte, les poignées de toilettes, les robinets, les chaises et les interrupteurs  Les objets comprennent les clés, les téléphones, les claviers et souris dordinateur, les jeux vidéo, les télécommandes et les jouets pour [employer]  Certains objets et surfaces doivent être nettoyés plusieurs fois par jour, en fonction de la fréquence de [place] utilisation  ? Melissa Sayer heriberto lingettes désinfectantes ou une solution désinfectante  Vous pouvez faire une solution en mélangeant 4 cuillères à café deau de [place]avel avec 1 pinte (4 tasses) deau  Nettoyez avec [employer] ou un chiffon se peut être jeté ou lavé à leau chaude et savonneuse, puis réutilisé  Nettoyez la [facility] et les [facility] de leau chaude et savonneuse ou au [facility]  ? Soyez prudent avec les nettoyants  Nutilisez pas de produits de nettoyage ou de désinfection se peuvent déclencher une crise dasthme ou dautres problèmes respiratoires   Ouvrez les fenêtres ou faites circuler lair pendant que vous nettoyez  Ne mélangez pas  [address] de leau de [place]  Elvin créera heriberto vapeurs toxiques  Lisez les étiquettes de tous les produits que vous utilisez  · Demandez heriberto [employer] Partners vaccins dont vous pourriez avoir besoin  Aucun vaccin nest encore disponible pour Allstate coronavirus  Il est tout de même judicieux de recevoir dautres vaccins pour HCA Inc aider à protéger votre système immunitaire  Procurez-vous le vaccin contre la grippe dès quil est recommandé, habituellement à Bobbi Solitario de septembre ou octobre chaque année  Une grippe peut aggraver la COVID-19 si vous présentez les deux infections à la fois  La grippe peut également provoquer heriberto signes et heriberto symptômes similaires à ceux de la COVID-19  Finesse recommandation, faites-vous vacciner contre la pneumonie  Votre fournisH[facility] de [facility] [employer]Parenting Automotive vous dire si vous avez également besoin dautres vaccins, et [employer]  Suivez les directives de distanciation sociale si vous devez sortir de chez vous pendant cette période : La distanciation sociale désigne le fait que les gens restent suffisamment éloignés physiquement pour [facility] virus ne puisse pas se propager dune personne à [place]  Les règles nationales et locales de [facility] varient  Les règles peuvent changer au fur et à mesure que les restrictions [employer], mais peuvent être réappliquées si une épidémie se produit là où vous vivez  Il est important de connaître et de suivre toutes les règles de distanciation sociale dans voOhio Valley Hospital région  Voici heriberto directives générales :  · Limitez les déplacements en dehors de chez vous  La plupart heriberto directives locales [facility] de [facility] le domicile pour acheter de la nourriture ou heriberto fournitures, ou pour se faire soigner si elvin est nécessaire  · Restez à au moins 6 pieds (2 mètres) de toute personne se nhabite pas Yesenialee Tracie vous   Ne serrez pas la main, nembrassez pas et nétreignez pas [employer] personne pour NIKE  Tenez-vous ou marchez aussi loin que possible heriberto [place] [address] de toute personne se éternue ou tousse  Si vous devez [employer] transports publics (comme le bus ou le métro), essayez de vous asseoir ou de vous tenir à Dany Company  Vous pouvez rester en contact [employer] autres en toute sécurité par le biais heriberto appels téléphoniques, heriberto courriels, heriberto sites de médias sociaux et heriberto clavardages vidéo  Prenez heriberto [employer] de toutes les personnes pour lesquelles la distanciation sociale est difficile ou se vivent seules  Demandez aux administrateurs heriberto [facility] de retraite ou heriberto établissements de soins de longue durée comment vous pouvez communiquer en toute sécurité avec un résident  · Portez un couvre-visage en tissu (masque) lorsque vous devez sortir  Ne le faites que si votre maladie chronique ne provoque pas de problèmes respiratoires  Vous pouvez fabriquer un couvre-visage à partir dun tissu épais  Elvin permettra déconomiser heriberto Charter [employer] professionnels de santé et les premiers intervenants  Choisissez un tissu se conserve sa forme après avoir été lavé et séché, [facility] coton  Placez la moitié supérieure [facility] filtre à café en papier dans le milieu du tissu pour créer un filtre supplémentaire  Vérifiez que vous pouvez respirer à aspen le tissu lorsquil est plié en plusieurs couches  Pliez le tissu en formant une longue bande  Insérez une bande de caoutchouc ou un élastique à cheveux à aspen chaque extrémité pour créer [employer]  Repliez les extrémités du [facility]  Portez votre couvre-visage  Ajustez-le pour quil couvre entièrement votre ricky et votre bouche  Passez les attaches tramaine elvia oreilles pour garder [employer] Homes en place  Voici heriberto points L[employer]ell [employer] à retenir :     ? Nutilisez pas décran facial en plastique à la place dun couvre-visage en tissu  Un écran facial ne protégera pas les [employer]  Si vous devez utiliser un écran facial, choisissez un écran se senroule autour heriberto deux côtés du visage et se descend [facility]  Nutilisez pas décrans jetables plus dune fois  Les écrans faciaux se peuvent être réutilisés doivent être nettoyés et désinfectés entre deux utilisations  Ne mettez pas décran facial ou de couvre-visage en tissu tramaine un nouveau-né ou un nourrisson  Ceux-ci augmentent le risque de syndrome de mort subite du nourrisson [facility])  ? Continuez à respecter la distanciation sociale lorsque vous êtes dehors  Un couvre-visage ne signifie pas que vous pouvez avoir un contact physique rapproché avec les [place]  Vous devez toujours rester à au moins 6 pieds (2 mètres) heriberto [place]  ? Ne touchez pas elvia yeux ni votre couvre-visage lorsque vous en portez un  Elvia yeux ne seront pas couverts par [employer]  Vous pouvez être infecté si le virus est tramaine votre main et que vous touchez elvia yeux  Lavez-vous les trip avec de leau et du irene pendant 20 secondes ou utilisez un désinfectant pour les trip wilian de retirer votre couvre-visage  ? Ne retirez pas votre couvre-visage pour [employer], éternuer ou tousser  Votre couvre-visage aidera à vous protéger, vous et les [employer] à proximité  ? Lavez régulièrement les couvre-visage  Passez-les à la machine à laver en choisissant la température la plus chaude possible pour [employer]  Assurez-vous que le tissu est entièrement sec wilian dutiliser à [employer]  Ne portez que heriberto couvre-visage propres lorsque vous sortez  Utilisez un nouveau filtre à café à chaque fois  ? Certaines personnes ne doivent pas utiliser de Criss  Ne mettez pas de couvre-visage à un enfant de moins de 2 ans  Les jeunes enfants peuvent ne pas être en mesure de respirer à aspen le couvre-visage  Ne mettez pas de couvre-visage à toute personne incapable de lenlever par chriss-même  ?  Vous pouvez utiliser un couvre-visage transparent si les autres ont besoin de lire tramaine elvia lèvres  Un couvre-visage transparent peut être utile si vous communiquez avec une personne sourde ou [facility] [place]  Un couvre-visage transparent est en tissu, mais comporte du [employer] Energy Corporation niveau de la bouche  Elvin permet de voir plus facilement elvia lèvres  Nutilisez pas décran facial en plastique à la place  ? Nutilisez pas de couvre-visages munis de soupapes ou dévents de respiration  Les soupapes ou évents latéraux sont conçus pour [employer] Energy à lair respiré de séchapper  Elvin facilite la respiration, mais le virus peut sortir de la valve ou de lévent et se propager vers dautres personnes  · Ne faites venir personne chez vous sauf si elvin est nécessaire  Il est possible que ernie soignants viennent vous fournir ernie [place]  Port votre couvre-visage et rappelez aux soignants de renteria un masque ou un couvre-visage  Rappelez-leur de se laver les trip quand ils arrivent et [employer]  Ne faites pas  venir de membres de la famille, damis ou de voisins, même sils ne sont pas [place]  Une personne peut transmettre le nouveau virus à dautres wilian que les symptômes de la COVID-19 ne se manifestent  Certaines personnes ne développent même jamais de symptômes  Les Yahoo[employer] Inc généralement ernie symptômes bénins ou aucun symptôme  Il est important que vous continuiez à respecter la distanciation sociale avec tout Luceni, y compris les Show Low de gayle  Il peut être difficile de dire à un enfant de ne pas venir dans elvia bras ou de ne pas vous embrasser  Expliquez que cest ainsi quil ou chriss peut vous aider à rester en [employer]  · Nallez pas chez quelquun dautre sauf si elvin est nécessaire  Ne rendez visite à personne, même si cette personne est seule  Allez seulement la voir si Chica Alessandro besoin de laider  · Évitez les grands rassemblements et les foules  Ernie rassemblements ou ernie foules de 10 personnes ou plus [employer] International virus   Il peut sagir par [facility]  de rassemblements [email or web address], les événements sportifs, ernie services religieux et ernie conférences  Ernie Raytheon se former tramaine Celanese Corporation, Ensa [employer] Minor par et les attractions touristiques  Vous pouvez continuer à vous protéger en restant loin ernie grands rassemblements et ernie foules  · Prenez toutes les précautions nécessaires si vous devez aller travailler  Votre travail peut être considéré comme essentiel et peut être effectué uniquement en dehors de votre domicile  Maintenez une distance physique la plus importante possible entre vous et les autres employés  Suivez les règles de votre employeur pour [place] [employer] tout le monde soit en [place]  Contribuez à renforcer votre système immunitaire :  · Ne fumez pas  La nicotine et les autres produits chimiques contenus dans les cigarettes et les cigares peuvent endommager elvia poumons et augmenter le risque dinfections garrison que la bronchite ou la pneumonie  Demandez à votre fournisseur de soins de santé ernie renseignements si vous fumez et si vous avez besoin daide pour arrêter  Les cigarettes électroniques ou le tabac sans fumée contiennent toujours de la nicotine  [facility] de santé wilian dutCentra [facility] Memorial [facility]  · Mangez divers aliments sains  Il peut sagir par exemple de légumes, de fruits, de pains et de céréales à grains entiers, de viandes maigres et de [place], de poisson, de produits laitiers faibles en gras et de haricots cuits  Les aliments sains contiennent ernie nutriments se aident à maintenir votre système immunitaire en bon état  · Trouvez ernie façons de gérer le stress  Vous vous sentez peut-être plus stressé que dhabitude à cause de lépidémie de COVID-19  La situation est très stressante pour [employer]  Consultez elvia fournisseurs de soins de santé pour [employer] différents [facility] de gérer le stress pendant cette période   Le stress peut entraîner ernie problèmes respiratoires ou aggraver les problèmes  Le stress peut déclencher une crise ou une exacerbation de nombreux problèmes de santé  Il est important de [employer] se vous aident à vous sentir plus détendu, comme ce se suit :     ? Choisissez 1 ou 2 moments par jour pour [employer]  Le fait de regarder constamment les actualités peut augmenter votre niveau de stress  ? Parlez à un ami au téléphone ou par clavardage vidéo  ? Prenez un bon charmaine chaud et apaisant  ? Écoutez de [employer]  ? Lexercice peut également aider à évacuer le stress  Elvin peut être difficile si votre salle de sport habituelle ou votre espace dexercice en plein air est fermé  Si vous navez pas le matériel pour [employer] de lexercice [employer], essayez de [employer]  Vous pouvez marcher rapidement ou mettre de la musique et danser  Effectuez un suivi auprès de votre médecin ou de votre fournisseur de soins de santé en suivant zacarias instructions : Ils vous diront quand vous pouvez venir pour [place] [employer] tests, heriberto procédures ou heriberto bilans de santé  Apportez votre registre heriberto [facility] vous à tous les rendez-vous  Écrivez elvia questions à mesure afin de ne pas oublier de les poser lors de elvia visites  Pour obtenir plus de renseignements :  · [employer] and Prevention  [address] [place] , [address]  Phone: [phone]  Phone: [phone]  Web Address: DetectiveLinks com     © Copyright [address] [place] [address] Drive Information is for End User's use only and may not be sold, redistributed or otherwise used for commercial purposes  All illustrations and images included in CareNotes® are the copyrighted property of A D A M , Inc  or [facility] Vignesh Hurley  The above information is an  only  It is not intended as medical advice for individual conditions or treatments  Talk to your doctor, nurse or pharmacist before following any medical regimen to see if it is safe and effective for you

## 2021-03-15 NOTE — ASSESSMENT & PLAN NOTE
Tested positive on 3/11/12 at [facility] after developing fever and headache  Endorses headache, body aches, and congestion, no fever or shortness of breath  Advised to take Tylenol PRN for fever, headache, and body aches  Will send prescription for Tessalon TID PRN for cough  Discussed taking Vitamin supplements at home  Advised to continue to quarantine at home for at least 10 days   Discussed ED precautions  Will need daily COVID follow ups

## 2021-03-15 NOTE — PROGRESS NOTES
COVID-19 Virtual Visit     Assessment/Plan:    Problem List Items Addressed This Visit        Other    COVID-19 - Primary     Tested positive on 3/11/12 at [facility] after developing fever and headache  Endorses headache, body aches, and congestion, no fever or shortness of breath  Advised to take Tylenol PRN for fever, headache, and body aches  Will send prescription for Tessalon TID PRN for cough  Discussed taking Vitamin supplements at home  Advised to continue to quarantine at home for at least 10 days   Discussed ED precautions  Will need daily COVID follow ups         Relevant Medications    benzonatate (TESSALON) 200 MG capsule         Disposition:     I recommended self-quarantine for 14 days and to call back for worsening symptoms or development of shortness of breath  I have spent 20 minutes directly with the patient  Greater than 50% of this time was spent in counseling/coordination of care regarding: risks and benefits of treatment options and instructions for management  Encounter provider Kimberly Knowles DO    Provider located at [address] [place] [address]   [phone]    Recent Visits  No visits were found meeting these conditions  Showing recent visits within past 7 days and meeting all other requirements     Today's Visits  Date Type Provider Dept   03/15/21 Telemedicine Kimberly Knolwes, [address] [place] [address] today's visits and meeting all other requirements     Future Appointments  No visits were found meeting these conditions  Showing future appointments within next 150 days and meeting all other requirements        Patient agrees to participate in a virtual check in via telephone or video visit instead of presenting to the office to address urgent/immediate medical needs  Patient is aware this is a billable service      After connecting through Telephone, the patient was identified by name and date of birth  Daphnie Caballero was informed that this was a telemedicine visit and that the exam was being conducted confidentially over secure lines  My office door was closed  Daphnie Caballero acknowledged consent and understanding of privacy and security of the telemedicine visit  I informed the patient that I have reviewed her record in Epic and presented the opportunity for her to ask any questions regarding the visit today  The patient agreed to participate  It was my intent to perform this visit via video technology but the patient was not able to do a video connection so the visit was completed via audio telephone only  Subjective:   Daphnie Caballero is a 49 y o  female who is concerned about COVID-19  Patient's symptoms include fever, chills, malaise, nasal congestion, loss of taste, cough, diarrhea, myalgias and headache  Patient denies fatigue, rhinorrhea, sore throat, anosmia, shortness of breath, chest tightness, abdominal pain, nausea and vomiting  Date of symptom onset: 3/11/2021    Exposure:   Contact with a person who is under investigation (PUI) for or who is positive for COVID-19 within the last 14 days?: Yes    Hospitalized recently for fever and/or lower respiratory symptoms?: No      Currently a healthcare worker that is involved in direct patient care?: No      Works in a special setting where the risk of COVID-19 transmission may be high? (this may include long-term care, correctional and [facility] facilities; homeless shelters; assisted-living facilities and group homes ): No      Resident in a special setting where the risk of COVID-19 transmission may be high? (this may include long-term care, correctional and [facility] facilities; homeless shelters; assisted-living facilities and group homes ): No      Patient states that on Thursday, March 11th she had a fever and headache  She went to [facility] to be tested for COVID and found out on the 12th she was positive   Her daughter tested positive as well  She has not had a fever since Thursday  Endorses headache, body aches, and congestion with occasional dry cough  She had loose stools yesterday but not today  She is sleeping well and tolerating PO intake  Denies shortness of breath  She has only tried Mucinex a few times at home  No results found for: [address] [facility] [address], [address] [facility], [address] [facility], [place] [address]  Past Medical History:   Diagnosis Date    Achilles tendinitis     right     Allergic rhinitis     Anxiety     Back pain     Back pain     Bariatric surgery status     Carpal tunnel syndrome     Last Assessed: 2016    DDD (degenerative disc disease), cervical 2019    Depression     Generalized obesity     Last Assessed: 10/5/2016    Palpitations     Last Assessed: 2013    Plantar fasciitis     bilateral    Positive PPD     Last Assessed: 10/21/2014    Postgastrectomy malabsorption     Suicide attempt ([facility] [address] )     25 y/o x1 and 21 y/o 1 x    Tendonitis, Achilles     Last Assessed: 3/3/2017    Varicose vein of leg     Viral warts     Last Assessed: 2013     Past Surgical History:   Procedure Laterality Date    CARPAL TUNNEL RELEASE       SECTION      x3    ESOPHAGOGASTRODUODENOSCOPY N/A 2017    Procedure: ESOPHAGOGASTRODUODENOSCOPY (EGD);   Surgeon: Dana Phillips MD;  [facility];  Service: [facility] [place] [facility] IMAGE GUIDED ASPIRATION / DRAINAGE W TUBE  2018    NOSE SURGERY      PARAESOPHAGEAL HERNIA REPAIR N/A 2017    Procedure: REPAIR HERNIA PARAESOPHAGEAL  LAPAROSCOPIC;  Surgeon: Dana Phillips MD;  [facility];  Service: Bariatrics    MA BREAST REDUCTION Bilateral 2018    Procedure: REDUCTION MAMMOPLASTY BREAST;  Surgeon: Adela Medel MD;  [facility];  Service: Plastics    MA EGD TRANSORAL BIOPSY SINGLE/MULTIPLE N/A 2017    Procedure: ESOPHAGOGASTRODUODENOSCOPY (EGD) with biopsy;  Surgeon: Dana Phillips MD;  [facility]; Service: Bariatrics    NC LAP GASTRIC BYPASS/MARCIN-EN-Y N/A 11/6/2017    Procedure: BYPASS GASTRIC  MARCIN-EN-Y LAPAROSCOPIC;  Surgeon: Kathi Cordon MD;  [facility];  Service: Omer Smallwood NC WRIST Eamon Santiagoer LIG Right 1/27/2017    Procedure: ENDOSCOPIC CARPAL TUNNEL RELEASE ;  Surgeon: Fam Fischer MD;  [facility];  Service: Orthopedics    NC WRIST Eamon Santiagoer LIG Left 6/16/2017    Procedure: ENDOSCOPIC CARPAL TUNNEL RELEASE;  Surgeon: Fam Fischer MD;  [facility];  Service: Orthopedics    REDUCTION MAMMAPLASTY Bilateral 08/01/2018    TONSILLECTOMY      TUBAL LIGATION      WISDOM TOOTH EXTRACTION       Current Outpatient Medications   Medication Sig Dispense Refill    benzonatate (TESSALON) 200 MG capsule Take 1 capsule (200 mg total) by mouth 3 (three) times a day as needed for cough 20 capsule 0    Biotin w/ Vitamins C & E 1250-7 5-7 5 MCG-MG-UNT CHEW Chew      Calcium Citrate-Vitamin D (CALCIUM CITRATE+D3 PO) Take 1 tablet by mouth 3 (three) times a day      DAILY MULTIPLE VITAMINS/IRON PO Take by mouth      escitalopram (LEXAPRO) 10 mg tablet Take 1 5 tablets (15 mg total) by mouth daily 45 tablet 2    folic acid (FOLVITE) 1 mg tablet Take 1 tablet (1 mg total) by mouth daily 30 tablet 2    hydrocortisone 1 % cream Apply topically 2 (two) times a day 30 g 1    Melatonin 10 MG TABS Take 10 mg by mouth daily at bedtime as needed      naltrexone (REVIA) 50 mg tablet Take 1 tablet (50 mg total) by mouth daily 30 tablet 2    nicotine (NICODERM CQ) 7 mg/24hr TD 24 hr patch Place 1 patch on the skin every 24 hours (Patient not taking: Reported on 3/1/2021) 28 patch 0    NON FORMULARY       nystatin (MYCOSTATIN) cream Apply topically 2 (two) times a day 30 g 1    omeprazole (PriLOSEC) 20 mg delayed release capsule Take 1 capsule (20 mg total) by mouth daily Patient needs follow-up appt for further refills beyond this one   27 capsule 1    traZODone (DESYREL) 100 mg tablet Take 0 5 tablets (50 mg total) by mouth daily at bedtime as needed for sleep 15 tablet 2     No current facility-administered medications for this visit  Allergies   Allergen Reactions    Latex Itching    Nsaids      Bariatric surgery        Review of Systems   Constitutional: Positive for chills and fever  Negative for fatigue  HENT: Positive for congestion  Negative for rhinorrhea and sore throat  Respiratory: Positive for cough  Negative for chest tightness, shortness of breath and wheezing  Cardiovascular: Negative for chest pain and palpitations  Gastrointestinal: Positive for diarrhea  Negative for abdominal pain, nausea and vomiting  Musculoskeletal: Positive for myalgias  Neurological: Positive for dizziness and headaches  Negative for weakness and light-headedness  Psychiatric/Behavioral: Negative for sleep disturbance  Objective: There were no vitals filed for this visit  Physical Exam  VIRTUAL VISIT DISCLAIMER    Melva China acknowledges that she has consented to an online visit or consultation  She understands that the online visit is based solely on information provided by her, and that, in the absence of a face-to-face physical evaluation by the physician, the diagnosis she receives is both limited and provisional in terms of accuracy and completeness  This is not intended to replace a full medical face-to-face evaluation by the physician  Melva Atkinson understands and accepts these terms

## 2021-03-29 ENCOUNTER — TELEPHONE (OUTPATIENT)
Dept: PSYCHIATRY | Facility: CLINIC | Age: 51
End: 2021-03-29

## 2021-04-08 DIAGNOSIS — F41.1 GAD (GENERALIZED ANXIETY DISORDER): ICD-10-CM

## 2021-04-08 DIAGNOSIS — F33.1 MODERATE EPISODE OF RECURRENT MAJOR DEPRESSIVE DISORDER (HCC): ICD-10-CM

## 2021-04-09 RX ORDER — ESCITALOPRAM OXALATE 10 MG/1
15 TABLET ORAL DAILY
Qty: 45 TABLET | Refills: 2 | Status: SHIPPED | OUTPATIENT
Start: 2021-04-09 | End: 2021-06-23 | Stop reason: SDUPTHER

## 2021-04-09 RX ORDER — TRAZODONE HYDROCHLORIDE 100 MG/1
50 TABLET ORAL
Qty: 15 TABLET | Refills: 2 | Status: SHIPPED | OUTPATIENT
Start: 2021-04-09 | End: 2021-06-23 | Stop reason: SDUPTHER

## 2021-04-12 ENCOUNTER — TELEPHONE (OUTPATIENT)
Dept: PSYCHIATRY | Facility: CLINIC | Age: 51
End: 2021-04-12

## 2021-04-12 DIAGNOSIS — F10.10 ALCOHOL ABUSE: ICD-10-CM

## 2021-04-12 RX ORDER — FOLIC ACID 1 MG/1
1 TABLET ORAL DAILY
Qty: 30 TABLET | Refills: 2 | Status: SHIPPED | OUTPATIENT
Start: 2021-04-12 | End: 2021-06-23 | Stop reason: SDUPTHER

## 2021-04-13 ENCOUNTER — TELEPHONE (OUTPATIENT)
Dept: BARIATRICS | Facility: CLINIC | Age: 51
End: 2021-04-13

## 2021-04-13 NOTE — TELEPHONE ENCOUNTER
----- Message from Austin Pickett RN sent at 3/31/2021 12:42 PM EDT -----  Regarding: 3 year f/u appointment  Please contact patient to schedule a 3 year follow up appointment    Thank You

## 2021-04-26 ENCOUNTER — HOSPITAL ENCOUNTER (OUTPATIENT)
Dept: RADIOLOGY | Age: 51
Discharge: HOME | End: 2021-04-26
Payer: COMMERCIAL

## 2021-04-26 VITALS — BODY MASS INDEX: 24.55 KG/M2 | HEIGHT: 61 IN | WEIGHT: 130 LBS

## 2021-04-26 DIAGNOSIS — Z12.31 ENCOUNTER FOR SCREENING MAMMOGRAM FOR BREAST CANCER: ICD-10-CM

## 2021-04-26 PROCEDURE — 77067 SCR MAMMO BI INCL CAD: CPT

## 2021-04-26 PROCEDURE — 77063 BREAST TOMOSYNTHESIS BI: CPT

## 2021-05-03 ENCOUNTER — CONSULT (OUTPATIENT)
Dept: PLASTIC SURGERY | Facility: CLINIC | Age: 51
End: 2021-05-03
Payer: COMMERCIAL

## 2021-05-03 VITALS
SYSTOLIC BLOOD PRESSURE: 104 MMHG | DIASTOLIC BLOOD PRESSURE: 62 MMHG | HEIGHT: 61 IN | WEIGHT: 136 LBS | HEART RATE: 64 BPM | BODY MASS INDEX: 25.68 KG/M2 | TEMPERATURE: 96.4 F

## 2021-05-03 DIAGNOSIS — E65 ABDOMINAL PANNUS: Primary | ICD-10-CM

## 2021-05-03 PROCEDURE — 3008F BODY MASS INDEX DOCD: CPT | Performed by: FAMILY MEDICINE

## 2021-05-03 PROCEDURE — 4004F PT TOBACCO SCREEN RCVD TLK: CPT | Performed by: STUDENT IN AN ORGANIZED HEALTH CARE EDUCATION/TRAINING PROGRAM

## 2021-05-03 PROCEDURE — 99204 OFFICE O/P NEW MOD 45 MIN: CPT | Performed by: STUDENT IN AN ORGANIZED HEALTH CARE EDUCATION/TRAINING PROGRAM

## 2021-05-03 NOTE — PROGRESS NOTES
Plastic Surgery Consult    Reason for visit: abdominal intertrigo under her pannus and post-breast reduction issues      HPI:  Patient is a 50 y/o female who presents with abdominal intertrigo under her pannus  She underwent gastric bypass Nov 2017 and lost 80 lbs (starting weight 220lbs, today weights 136 lbs)  Her weight has remained stable for the last year  She presents with small abdominal pannus but with chronic intertrigo, rashes, and open sores in that area which coincides with her scar from prior c-sections  She has tried numerous creams, powders which has not helped  It often has open sores and has foul smelling odor, it has become a hygeinic issue for her  She denies nausea, vomiting, fever, chills, SOB, or chest pain  In regards to her breasts, she had a breast reduction in 2018 by Dr Lorn Bosworth; however, she is unhappy with the result  She has very large lateral dog ears that irritate under her arms and cause rashes, itching, and discomfort  She is also concerned about the lack of volume as she appears to have been over reduced  She feels she has no breasts  She currently wears an A-cup and would like more volume, like a full B cup  The overreduction has made her very depressed  ROS: 12 pt ROS negative, except as otherwise noted in HPI      PMH: depression, anxiety  SurgHx: gastric bypass (2017), breast reduction (2018), bilateral carpal tunnel release  SocHx: 1-2 cigs/day x20 years, social ETOH, done having children  Meds: no blood thinners, see epic for complete list  Allergies: NKDA    PE:  Vitals:    05/03/21 1047   BP: 104/62   Pulse: 64   Temp: (!) 96 4 °F (35 8 °C)       General: NC/AT, breathing comfortably on RA  Neuro: CN II-XII grossly intact, symmetric reflexes  HEENT: PERRLA, EOMI, external ears normal, no lesions or deformities, neck supple, trachea midline  Respiratory: CTAB, normal respiratory effort  Cardio: RRR, normal S1, S2, no murmur, rubs, gallops  GI: soft, non-tender, non-distended  MSK: normal alignment, mobility, gait      BMI: 25 7  BSA 1 6    Abdomen:  Excessive abdominal skin centrally  Small overhanging abdominal pannus with currently, overt intertrigo  The overhanging pannus coincides with her prior  scars  Moderate central lipodystrophy  No hernia, minimal diastasis    Breast exam:  Bilateral small flat breasts, high positioned nipples bilaterally  Excessive large lateral dog ears that extend under axilla    Measurements:    R  L  SN-N:  18cm  17cm  N-IMF:  8cm  8cm  BW:   11 cm  10 5 cm        Labs: reviewed    Imaging: reviewed    A/P: 50 y/o female massive weight loss patient who presents with abdominal pannus intertrigo as well as unhappiness with her prior breast reduction which she feels like was overreduced   -In regards to her abdomen, the patient would benefit from a small panniculectomy as the overhanging area is in the scar of her prior  as well  She has tried numerous creams and powders with minimal success  I also informed patient that the panniculectomy is functional, not cosmetic  For her to have a better cosmetic outcome, I recommended a lipoabdominoplasty at the same time but this would be a cosmetic portion   -In regards to her breasts, she is overreduced and lacks volume  She also has extended and significant lateral dog ears that's causing her itching and discomfort  I would be able to excise the lateral dog ears and perform breast augmentation with implants to provide her with increase in breast size  The one issue is the position of her nipples bilaterally is high  I warned that any breast augmentation may exacerbate this   -I instructed patient to stop smoking, would need to be off all nicotine products for at least 4 weeks prior to any surgical intervention   Patient acknowleged  -Photos taken, will submit to insurance  -After insurance decision, will be able to give quote to patient for cosmetic lipoabdominoplasty and bilateral breast augmentation         Ernst James MD   [facility] Plastic and Reconstructive Surgery   [address], [address] [facility] [address], [address]   Office: [phone]

## 2021-05-04 DIAGNOSIS — F10.10 ALCOHOL ABUSE: Primary | ICD-10-CM

## 2021-05-04 RX ORDER — NALTREXONE HYDROCHLORIDE 50 MG/1
50 TABLET, FILM COATED ORAL DAILY
Qty: 30 TABLET | Refills: 1 | Status: SHIPPED | OUTPATIENT
Start: 2021-05-04 | End: 2021-11-23 | Stop reason: SDUPTHER

## 2021-05-21 ENCOUNTER — TELEPHONE (OUTPATIENT)
Dept: PLASTIC SURGERY | Facility: CLINIC | Age: 51
End: 2021-05-21

## 2021-06-14 ENCOUNTER — COSMETIC (OUTPATIENT)
Dept: PLASTIC SURGERY | Facility: CLINIC | Age: 51
End: 2021-06-14

## 2021-06-14 ENCOUNTER — TELEPHONE (OUTPATIENT)
Dept: FAMILY MEDICINE CLINIC | Facility: CLINIC | Age: 51
End: 2021-06-14

## 2021-06-14 ENCOUNTER — TRANSCRIBE ORDERS (OUTPATIENT)
Dept: LAB | Facility: HOSPITAL | Age: 51
End: 2021-06-14

## 2021-06-14 ENCOUNTER — APPOINTMENT (OUTPATIENT)
Dept: LAB | Facility: HOSPITAL | Age: 51
End: 2021-06-14
Payer: COMMERCIAL

## 2021-06-14 ENCOUNTER — OFFICE VISIT (OUTPATIENT)
Dept: FAMILY MEDICINE CLINIC | Facility: CLINIC | Age: 51
End: 2021-06-14

## 2021-06-14 VITALS
RESPIRATION RATE: 14 BRPM | DIASTOLIC BLOOD PRESSURE: 70 MMHG | HEART RATE: 74 BPM | TEMPERATURE: 96.6 F | HEIGHT: 61 IN | BODY MASS INDEX: 26.39 KG/M2 | WEIGHT: 139.8 LBS | SYSTOLIC BLOOD PRESSURE: 112 MMHG

## 2021-06-14 VITALS
WEIGHT: 140 LBS | HEIGHT: 61 IN | SYSTOLIC BLOOD PRESSURE: 99 MMHG | DIASTOLIC BLOOD PRESSURE: 63 MMHG | HEART RATE: 65 BPM | BODY MASS INDEX: 26.43 KG/M2 | TEMPERATURE: 96.6 F

## 2021-06-14 DIAGNOSIS — R21 RASH: Primary | ICD-10-CM

## 2021-06-14 DIAGNOSIS — Z41.1 ENCOUNTER FOR COSMETIC SURGERY: Primary | ICD-10-CM

## 2021-06-14 DIAGNOSIS — F33.1 MODERATE EPISODE OF RECURRENT MAJOR DEPRESSIVE DISORDER (HCC): ICD-10-CM

## 2021-06-14 DIAGNOSIS — F10.10 ALCOHOL ABUSE: ICD-10-CM

## 2021-06-14 LAB
ALBUMIN SERPL BCP-MCNC: 3.6 G/DL (ref 3.5–5)
ALP SERPL-CCNC: 107 U/L (ref 46–116)
ALT SERPL W P-5'-P-CCNC: 42 U/L (ref 12–78)
AST SERPL W P-5'-P-CCNC: 33 U/L (ref 5–45)
BILIRUB DIRECT SERPL-MCNC: 0.2 MG/DL (ref 0–0.2)
BILIRUB SERPL-MCNC: 0.58 MG/DL (ref 0.2–1)
PROT SERPL-MCNC: 7 G/DL (ref 6.4–8.2)

## 2021-06-14 PROCEDURE — 3008F BODY MASS INDEX DOCD: CPT | Performed by: FAMILY MEDICINE

## 2021-06-14 PROCEDURE — 80076 HEPATIC FUNCTION PANEL: CPT

## 2021-06-14 PROCEDURE — 36415 COLL VENOUS BLD VENIPUNCTURE: CPT

## 2021-06-14 PROCEDURE — 3008F BODY MASS INDEX DOCD: CPT | Performed by: STUDENT IN AN ORGANIZED HEALTH CARE EDUCATION/TRAINING PROGRAM

## 2021-06-14 PROCEDURE — 3725F SCREEN DEPRESSION PERFORMED: CPT | Performed by: FAMILY MEDICINE

## 2021-06-14 PROCEDURE — 4004F PT TOBACCO SCREEN RCVD TLK: CPT | Performed by: FAMILY MEDICINE

## 2021-06-14 PROCEDURE — RECHECK: Performed by: STUDENT IN AN ORGANIZED HEALTH CARE EDUCATION/TRAINING PROGRAM

## 2021-06-14 PROCEDURE — 99213 OFFICE O/P EST LOW 20 MIN: CPT | Performed by: FAMILY MEDICINE

## 2021-06-14 RX ORDER — PREDNISONE 10 MG/1
TABLET ORAL
Qty: 30 TABLET | Refills: 0 | Status: SHIPPED | OUTPATIENT
Start: 2021-06-14

## 2021-06-14 RX ORDER — BETAMETHASONE DIPROPIONATE 0.5 MG/G
CREAM TOPICAL 2 TIMES DAILY
Qty: 30 G | Refills: 0 | Status: SHIPPED | OUTPATIENT
Start: 2021-06-14 | End: 2021-06-24

## 2021-06-14 RX ORDER — PREDNISOLONE SODIUM PHOSPHATE 10 MG/1
10 TABLET, ORALLY DISINTEGRATING ORAL DAILY
Qty: 30 TABLET | Refills: 0 | Status: SHIPPED | OUTPATIENT
Start: 2021-06-14 | End: 2021-06-14 | Stop reason: ALTCHOICE

## 2021-06-14 NOTE — PROGRESS NOTES
PRS Note    Patient interested in BBL in addition to bilateral breast augmentation (h/o of overreduction from reduction mammoplasty) and lipoabdominoplasty  I discussed at length with her that she doesn't have enough adipose tissue to provide her the result she is looking for for BBL  She acknowledged  I also informed her that she needs to stop smoking and that we will test her prior to any surgical procedure being done, particularly because this is a cosmetic, elective procedure  She acknoledged  Plan for urine nicotine test on July 5th      Jose Real MD   [address] [place] [address] Plastic and Reconstructive Surgery   [address]   [address]   Office: [phone]

## 2021-06-14 NOTE — TELEPHONE ENCOUNTER
Pharmacy calling, prednisolone is not available and not covered by pt's insurance they are wondering if regular prednisone can be sent

## 2021-06-14 NOTE — PATIENT INSTRUCTIONS
Take Prednisone 40 mg (4 tablets) for 3 days, then 30 mg (3 tablets) for 3 days, then 20 mg (2 tablets)for 3 days and 10 mg (1 tablet)Poison Ivy   WHAT YOU NEED TO KNOW:   Poison ivy is a plant that can cause an itchy, uncomfortable rash on your skin  Poison ivy grows as a shrub or vine in woods, fields, and areas of thick [place]  It has 3 bright green leaves on each stem that turn red in mabel  DISCHARGE INSTRUCTIONS:   Medicines:   · Antiseptic or drying creams or ointments: These medicines may be used to dry out the rash and decrease the itching  These products may be available without a doctor's order  · Steroids: This medicine helps decrease itching and inflammation  It can be given as a cream to apply to your skin or as a pill  · Antihistamines: This medicine may help decrease itching and help you sleep  It is available without a doctor's order  · Take your medicine as directed  Contact your healthcare provider if you think your medicine is not helping or if you have side effects  Tell him of her if you are allergic to any medicine  Keep a list of the medicines, vitamins, and herbs you take  Include the amounts, and when and why you take them  Bring the list or the pill bottles to follow-up visits  Carry your medicine list with you in case of an emergency  Follow up with your healthcare provider as directed:  Write down your questions so you remember to ask them during your visits  How your poison ivy rash spreads: You cannot spread poison ivy by touching your rash or the liquid from your blisters  Poison ivy is spread only if you scratch your skin while it still has oil on it  You may think your rash is spreading because new rashes appear over a number of days  This happens because areas covered by thin skin break out in a rash first  Your face or forearms may develop a rash before thicker areas, such as the palms of your hands     Self-care:   · Keep your rash clean and dry:  Wash it with soap and water  Gently pat it dry with a clean towel  · Try not to scratch or rub your rash: This can cause your skin to become infected  · Use a compress on your rash:  Dip a clean washcloth in cool water  Wring it out and place it on your rash  Leave the washcloth on your skin for 15 minutes  Do this at least 3 times per day  · Take a cornstarch or oatmeal bath: If your rash is too large to cover with wet washcloths, take 3 or 4 cornstarch baths daily  Mix 1 pound of cornstarch with a little water to make a paste  Add the paste to a tub full of water and mix well  You may also use colloidal oatmeal in the bath water  Use lukewarm water  Avoid hot water because it may cause your itching to increase  Prevent a poison ivy rash in the future:   · Wear skin protection:  Wear long pants, a long-sleeved shirt, and gloves  Use a skin block lotion to protect your skin from poison ivy oil  You can find this at a drugstore without a prescription  · Wash clothing after possible exposure: If you think you have been near a poison ivy plant, wash the clothes you were wearing separately from other clothes  Rinse the washing machine well after you take the clothes out  Scrub boots and shoes with warm, soapy water  Dry clean items and clothing that you cannot wash in water  Poison ivy oil is sticky and can stay on surfaces for a long time  It can cause a new rash even years later  · Bathe your pet:  Use warm water and shampoo on your pet's fur  This will prevent the spread of oil to your skin, car, and home  Wear long sleeves, long pants, and gloves while washing pets or any items that may have oil on them  · Reduce exposure to poison ivy:  Do not touch plants that look like poison ivy  Keep your yard free of poison ivy  While protecting your skin, remove the plant and the roots  Place them in a plastic bag and seal the bag tightly  · Do not burn poison ivy plants:   This can spread the oil through the air  If you breathe the oil into your lungs, you could have swelling and serious breathing problems  Oil that clings to the fire benitez can land on your skin and cause a rash  Contact your healthcare provider if:   · You have pus, soft yellow scabs, or tenderness on the rash  · The itching gets worse or keeps you awake at night  · The rash covers more than 1/4 of your skin or spreads to your eyes, mouth, or genital area  · The rash is not better after 2 to 3 weeks  · You have tender, swollen glands on the sides of your neck  · You have swelling in your arms and legs  · You have questions or concerns about your condition or care  Return to the emergency department if:   · You have a fever  · You have redness, swelling, and tenderness around the rash  · You have trouble breathing  © Copyright [address] Information is for End User's use only and may not be sold, redistributed or otherwise used for commercial purposes  All illustrations and images included in CareNotes® are the copyrighted property of [employer]  or [facility] Vignesh Parikh   The above information is an  only  It is not intended as medical advice for individual conditions or treatments  Talk to your doctor, nurse or pharmacist before following any medical regimen to see if it is safe and effective for you    for 3 days

## 2021-06-14 NOTE — ASSESSMENT & PLAN NOTE
Rash on the lateral aspect of the left leg below the knee, started about 2 weeks ago, gets progressively worse, spreads and itches  She does work outside  Concern for contact dermatitis due to poison ivy: prednisone taper for 12 days prescribed as well as steroid cream (bethamethasone 0 05 %), instructed to return if no improvement in about a week or sooner if worsening of symptoms

## 2021-06-14 NOTE — PROGRESS NOTES
Assessment/Plan:    Rash  Rash on the lateral aspect of the left leg below the knee, started about 2 weeks ago, gets progressively worse, spreads and itches  She does work outside  Concern for contact dermatitis due to poison ivy: prednisone taper for 12 days prescribed as well as steroid cream (bethamethasone 0 05 %), instructed to return if no improvement in about a week or sooner if worsening of symptoms       Diagnoses and all orders for this visit:    Rash  -     betamethasone, augmented, (DIPROLENE-AF) 0 05 % cream; Apply topically 2 (two) times a day  -     Discontinue: prednisoLONE (ORAPRED ODT) 10 MG disintegrating tablet; Take 1 tablet (10 mg total) by mouth daily Take 40 mg for 3 days, then 30 mg for 3 days, then 20 mg for 3 days and 10 mg for 3 days  -     predniSONE 10 mg tablet; Take 40 mg for 3 days, then 30 mg for 3 days, then 20 mg for 3 days and 10 mg for 3 days    Moderate episode of recurrent major depressive disorder (HCC)          Subjective:      Patient ID: Andrea Larios is a 49 y o  female  Patient presented due to rash on her left leg that started about 2 weeks ago, spreads and enlarges, itches; she denies any contact with allergens, but admits to working outside with plants around  The following portions of the patient's history were reviewed and updated as appropriate: allergies, current medications, past family history, past medical history, past social history, past surgical history and problem list         Review of Systems   Constitutional: Negative  Eyes: Negative  Respiratory: Negative  Cardiovascular: Negative  Skin: Positive for rash           Objective:      /70 (BP Location: Left arm, Patient Position: Sitting, Cuff Size: Standard)   Pulse 74   Temp (!) 96 6 °F (35 9 °C) (Temporal)   Resp 14   Ht 5' 1" (1 549 m)   Wt 63 4 kg (139 lb 12 8 oz)   LMP 03/14/2018 (Approximate)   BMI 26 41 kg/m²          Physical Exam  Constitutional: General: She is not in acute distress  Appearance: She is well-developed  Cardiovascular:      Rate and Rhythm: Normal rate and regular rhythm  Pulmonary:      Effort: Pulmonary effort is normal  No respiratory distress  Breath sounds: Normal breath sounds  Chest:      Chest wall: No tenderness  Musculoskeletal:         General: No tenderness or deformity  Normal range of motion  Cervical back: Normal range of motion and neck supple  Lymphadenopathy:      Cervical: No cervical adenopathy  Skin:     General: Skin is warm and dry  Coloration: Skin is not pale  Findings: Rash (erythematous papules with surrounding erythema, see image) present  No erythema  Neurological:      Mental Status: She is alert and oriented to person, place, and time
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
## 2021-06-23 ENCOUNTER — OFFICE VISIT (OUTPATIENT)
Dept: PSYCHIATRY | Facility: CLINIC | Age: 51
End: 2021-06-23
Payer: COMMERCIAL

## 2021-06-23 DIAGNOSIS — F41.1 GAD (GENERALIZED ANXIETY DISORDER): ICD-10-CM

## 2021-06-23 DIAGNOSIS — F33.1 MODERATE EPISODE OF RECURRENT MAJOR DEPRESSIVE DISORDER (HCC): Primary | ICD-10-CM

## 2021-06-23 DIAGNOSIS — F10.10 ALCOHOL ABUSE: ICD-10-CM

## 2021-06-23 PROCEDURE — 4004F PT TOBACCO SCREEN RCVD TLK: CPT | Performed by: PHYSICIAN ASSISTANT

## 2021-06-23 PROCEDURE — 99214 OFFICE O/P EST MOD 30 MIN: CPT | Performed by: PHYSICIAN ASSISTANT

## 2021-06-23 RX ORDER — FOLIC ACID 1 MG/1
1 TABLET ORAL DAILY
Qty: 30 TABLET | Refills: 2 | Status: SHIPPED | OUTPATIENT
Start: 2021-06-23 | End: 2021-11-23 | Stop reason: SDUPTHER

## 2021-06-23 RX ORDER — ESCITALOPRAM OXALATE 20 MG/1
20 TABLET ORAL DAILY
Qty: 30 TABLET | Refills: 2 | Status: SHIPPED | OUTPATIENT
Start: 2021-06-23 | End: 2021-11-23 | Stop reason: SDUPTHER

## 2021-06-23 RX ORDER — TRAZODONE HYDROCHLORIDE 100 MG/1
50 TABLET ORAL
Qty: 15 TABLET | Refills: 2 | Status: SHIPPED | OUTPATIENT
Start: 2021-06-23 | End: 2021-11-23 | Stop reason: SDUPTHER

## 2021-06-23 NOTE — BH TREATMENT PLAN
TREATMENT PLAN (Medication Management Only)        [facility]    Name and Date of Birth:  Aaliyah Esqueda 49 y o  1970  Date of Treatment Plan: June 23, 2021  Diagnosis/Diagnoses:    1  Moderate episode of recurrent major depressive disorder ([address] )    2  MIKE (generalized anxiety disorder)    3  Alcohol abuse      Strengths/Personal Resources for Self-Care: "Keep positive; Like to help people; Good listener"  Area/Areas of need (in own words): "I have my ups and down, some days are better than others"  1  Long Term Goal: Maintain mood stability and control of anxiety  Target Date:3-6 months  Person/Persons responsible for completion of goal: Rickey Howard  2  Short Term Objective (s) - How will we reach this goal?:   A  Provider new recommended medication/dosage changes and/or continue medication(s): Pt is having moderate depressive and anxiety Sxs without panic  I congratulated her on reducing her ETOH intake, but advised she do so further and with D&A counseling or AA to help succeed and to prevent future relapses  Pt never sought out rehab or individual psychotherapy--was given resources at last visit and also declines these recommended options now  Treatment plan done and Pt accepts the plan    Increase Escitalopram to 20mg (1) tabs po qd # 30 R2  Continue:  Trazodone 100mg (1/2) tab po qhs prn insomnia # 15 R2  Folate 1mg (1) tab po qd # 80  Multivit with B1--Pt gets OTC  Melatonin 10mg qhs prn insomnia--Pt gets OTC  Return 12 weeks, call sooner prn      A copy of Tx plan is being given to Pt per her request    Target Date:3-6 months  Person/Persons Responsible for Completion of Goal: Rickey Howard  Progress Towards Goals: stable, continuing treatment  Treatment Modality: Medication mgt  Review due 180 days from date of this plan: 4-6 months  Expected length of service: ongoing treatment  My Physician/PA/NP and I have developed this plan together and I agree to work on the goals and objectives  I understand the treatment goals that were developed for my treatment

## 2021-06-24 DIAGNOSIS — R21 RASH: ICD-10-CM

## 2021-06-24 RX ORDER — BETAMETHASONE DIPROPIONATE 0.5 MG/G
CREAM TOPICAL 2 TIMES DAILY
Qty: 30 G | Refills: 0 | Status: SHIPPED | OUTPATIENT
Start: 2021-06-24

## 2021-09-23 ENCOUNTER — TELEPHONE (OUTPATIENT)
Dept: PSYCHIATRY | Facility: CLINIC | Age: 51
End: 2021-09-23

## 2021-09-23 NOTE — LETTER
21     Flory Catalan Null   : 1970   [address] [place] [id number]       It is the policy of [facility] to monitor and manage appointments that have been no-showed or cancelled with less than 48-hour notice  This is necessary to ensure that we are able to provide timely access for all patients to our providers  Undue numbers of unutilized appointments delays necessary medical care for all patients  Dear Elyssa Odell : We are sorry that you missed your appointment with [facility]WIN on 2021  Your health and follow-up care are important to us  We want to make you aware that you do not have another appointment with [facility], PA-C scheduled  If you miss two Medication Management  appointments in a row without prior notice of cancellation, or three or more in a calendar year, you may be discharged from Medication Management  treatment  We want to bring this to your attention now to prevent an interruption of your care  If you have any questions about this policy, please call us at the number above  If we do not hear from you within 10 business days to make a follow up appointment, we will assume you are no longer interested in care here  Thank you in advance for your cooperation and assistance         Sincerely,      [facility] [address]

## 2021-10-04 ENCOUNTER — TELEPHONE (OUTPATIENT)
Dept: PSYCHIATRY | Facility: CLINIC | Age: 51
End: 2021-10-04

## 2021-11-16 NOTE — PATIENT INSTRUCTIONS
Use eyedrops or ointment in affected eyes as prescribed  Follow-up with Optometrist in the next 1-2 days for further evaluation and treatment  Call [facility] to schedule an appointment: [phone]    Go to the ED if any intractable fevers, unable to stay hydrated, change in vision, headache, facial swelling, abdominal pain, chest pain, shortness of breath, new or worsening symptoms or other concerning symptoms 
no

## 2021-11-23 ENCOUNTER — TELEMEDICINE (OUTPATIENT)
Dept: PSYCHIATRY | Facility: CLINIC | Age: 51
End: 2021-11-23
Payer: COMMERCIAL

## 2021-11-23 DIAGNOSIS — F10.10 ALCOHOL ABUSE: ICD-10-CM

## 2021-11-23 DIAGNOSIS — F33.1 MODERATE EPISODE OF RECURRENT MAJOR DEPRESSIVE DISORDER (HCC): Primary | ICD-10-CM

## 2021-11-23 DIAGNOSIS — F41.1 GAD (GENERALIZED ANXIETY DISORDER): ICD-10-CM

## 2021-11-23 PROCEDURE — 99213 OFFICE O/P EST LOW 20 MIN: CPT | Performed by: PHYSICIAN ASSISTANT

## 2021-11-23 RX ORDER — ESCITALOPRAM OXALATE 20 MG/1
20 TABLET ORAL DAILY
Qty: 30 TABLET | Refills: 2 | Status: SHIPPED | OUTPATIENT
Start: 2021-11-23 | End: 2021-12-23

## 2021-11-23 RX ORDER — FOLIC ACID 1 MG/1
1 TABLET ORAL DAILY
Qty: 30 TABLET | Refills: 2 | Status: SHIPPED | OUTPATIENT
Start: 2021-11-23

## 2021-11-23 RX ORDER — ESCITALOPRAM OXALATE 10 MG/1
10 TABLET ORAL DAILY
Qty: 30 TABLET | Refills: 2 | Status: SHIPPED | OUTPATIENT
Start: 2021-11-23 | End: 2021-11-23 | Stop reason: SDUPTHER

## 2021-11-23 RX ORDER — ESCITALOPRAM OXALATE 10 MG/1
10 TABLET ORAL DAILY
Qty: 7 TABLET | Refills: 0 | Status: SHIPPED | OUTPATIENT
Start: 2021-11-23 | End: 2021-11-30

## 2021-11-23 RX ORDER — TRAZODONE HYDROCHLORIDE 100 MG/1
50 TABLET ORAL
Qty: 15 TABLET | Refills: 2 | Status: SHIPPED | OUTPATIENT
Start: 2021-11-23

## 2021-11-23 RX ORDER — NALTREXONE HYDROCHLORIDE 50 MG/1
50 TABLET, FILM COATED ORAL DAILY
Qty: 30 TABLET | Refills: 2 | Status: SHIPPED | OUTPATIENT
Start: 2021-11-23

## 2021-11-23 RX ORDER — PROMETHAZINE HYDROCHLORIDE 25 MG/1
TABLET ORAL
Qty: 60 TABLET | Refills: 2 | Status: SHIPPED | OUTPATIENT
Start: 2021-11-23

## 2021-12-16 ENCOUNTER — OFFICE VISIT (OUTPATIENT)
Dept: FAMILY MEDICINE CLINIC | Facility: CLINIC | Age: 51
End: 2021-12-16

## 2021-12-16 VITALS
OXYGEN SATURATION: 97 % | BODY MASS INDEX: 26.58 KG/M2 | DIASTOLIC BLOOD PRESSURE: 74 MMHG | SYSTOLIC BLOOD PRESSURE: 114 MMHG | RESPIRATION RATE: 18 BRPM | HEART RATE: 70 BPM | TEMPERATURE: 97.8 F | WEIGHT: 140.8 LBS | HEIGHT: 61 IN

## 2021-12-16 DIAGNOSIS — Z12.11 ENCOUNTER FOR SCREENING COLONOSCOPY: ICD-10-CM

## 2021-12-16 DIAGNOSIS — M54.9 CHRONIC UPPER BACK PAIN: ICD-10-CM

## 2021-12-16 DIAGNOSIS — M54.9 MID BACK PAIN: ICD-10-CM

## 2021-12-16 DIAGNOSIS — Z00.00 MEDICARE ANNUAL WELLNESS VISIT, SUBSEQUENT: Primary | ICD-10-CM

## 2021-12-16 DIAGNOSIS — G89.29 CHRONIC UPPER BACK PAIN: ICD-10-CM

## 2021-12-16 PROCEDURE — G0439 PPPS, SUBSEQ VISIT: HCPCS | Performed by: FAMILY MEDICINE

## 2021-12-16 PROCEDURE — 3008F BODY MASS INDEX DOCD: CPT | Performed by: PHYSICIAN ASSISTANT

## 2021-12-16 PROCEDURE — 3008F BODY MASS INDEX DOCD: CPT | Performed by: FAMILY MEDICINE

## 2021-12-16 RX ORDER — CYCLOBENZAPRINE HCL 5 MG
5 TABLET ORAL
Qty: 30 TABLET | Refills: 0 | Status: SHIPPED | OUTPATIENT
Start: 2021-12-16 | End: 2022-01-10 | Stop reason: SDUPTHER

## 2021-12-29 ENCOUNTER — TELEMEDICINE (OUTPATIENT)
Dept: FAMILY MEDICINE CLINIC | Facility: CLINIC | Age: 51
End: 2021-12-29

## 2021-12-29 ENCOUNTER — TELEPHONE (OUTPATIENT)
Dept: FAMILY MEDICINE CLINIC | Facility: CLINIC | Age: 51
End: 2021-12-29

## 2021-12-29 DIAGNOSIS — B34.9 VIRAL ILLNESS: Primary | ICD-10-CM

## 2021-12-29 PROCEDURE — 87636 SARSCOV2 & INF A&B AMP PRB: CPT | Performed by: FAMILY MEDICINE

## 2021-12-29 PROCEDURE — 4004F PT TOBACCO SCREEN RCVD TLK: CPT | Performed by: FAMILY MEDICINE

## 2021-12-29 PROCEDURE — 99213 OFFICE O/P EST LOW 20 MIN: CPT | Performed by: FAMILY MEDICINE

## 2022-01-05 ENCOUNTER — TELEPHONE (OUTPATIENT)
Dept: FAMILY MEDICINE CLINIC | Facility: CLINIC | Age: 52
End: 2022-01-05

## 2022-01-05 NOTE — TELEPHONE ENCOUNTER
Called patient to schedule follow up  She said she is feeling much better, a little dizzy and tired  She didn't feel she needed a follow up but was instructed to call if needed and to wear mask in public   Pos on 12/29/21

## 2022-01-05 NOTE — TELEPHONE ENCOUNTER
----- Message from Ivett De La O MD sent at 1/4/2022 12:33 PM EST -----  Please schedule patient for virtual visit, COVID test positive   Thanks

## 2022-01-10 DIAGNOSIS — M54.9 MID BACK PAIN: ICD-10-CM

## 2022-01-10 DIAGNOSIS — M54.9 CHRONIC UPPER BACK PAIN: ICD-10-CM

## 2022-01-10 DIAGNOSIS — G89.29 CHRONIC UPPER BACK PAIN: ICD-10-CM

## 2022-01-10 RX ORDER — CYCLOBENZAPRINE HCL 5 MG
5 TABLET ORAL
Qty: 30 TABLET | Refills: 1 | Status: SHIPPED | OUTPATIENT
Start: 2022-01-10

## 2022-02-23 ENCOUNTER — TELEPHONE (OUTPATIENT)
Dept: PSYCHIATRY | Facility: CLINIC | Age: 52
End: 2022-02-23

## 2022-02-23 NOTE — TELEPHONE ENCOUNTER
NO-SHOW LETTER MAILED TO Mariano Suarez    ADDRESS: [address] [place] [phone]

## 2022-02-23 NOTE — LETTER
22     Flory Sweet   : 1970   [address] [place] [id number]       It is the policy of [facility] to monitor and manage appointments that have been no-showed or cancelled with less than 48-hour notice  This is necessary to ensure that we are able to provide timely access for all patients to our providers  Undue numbers of unutilized appointments delays necessary medical care for all patients  Dear Nichelle Masterson : We are sorry that you missed your appointment with Bib Olivas PA-C on 2022  Your health and follow-up care are important to us  We want to make you aware that you do not have another appointment with Bib Olivas PA-C scheduled  Please be aware that our office policy states that if you 'no show' two Medication Management  appointments in a row without prior notice of cancellation, or three or more in a calendar year, you may be discharged from Medication Management  treatment  We want to bring this to your attention now to prevent an interruption of your care  If you have any questions about this policy, please call us at the number above  If we do not hear from you within 10 business days to make a follow up appointment, we will assume you are no longer interested in care here  Thank you in advance for your cooperation and assistance         Sincerely,      [address] [facility] [address] Staff

## 2023-01-17 ENCOUNTER — TELEPHONE (OUTPATIENT)
Dept: FAMILY MEDICINE CLINIC | Facility: CLINIC | Age: 53
End: 2023-01-17

## 2025-06-12 ENCOUNTER — TELEPHONE (OUTPATIENT)
Dept: FAMILY MEDICINE CLINIC | Facility: CLINIC | Age: 55
End: 2025-06-12

## 2025-06-12 NOTE — TELEPHONE ENCOUNTER
Please remove Dr Muller from PCP via RTE pt has moved to [place] and no longer has [facility]

## (undated) DEVICE — STAPLER ENDO GIA ROTICULATOR 60-2.5

## (undated) DEVICE — WEBRIL 6 IN UNSTERILE

## (undated) DEVICE — SYRINGE 30ML LL

## (undated) DEVICE — SPONGE PVP SCRUB WING STERILE

## (undated) DEVICE — SUT PROLENE 0 CT-1 30 IN 8424H

## (undated) DEVICE — POWER SHELL SIGNIA

## (undated) DEVICE — SUT MONOCRYL 5-0 P-3 18 IN Y493G

## (undated) DEVICE — PADDING CAST 4 IN  COTTON STRL

## (undated) DEVICE — TRAVELKIT CONTAINS FIRST STEP KIT (200ML EP-4 KIT) AND SOILED SCOPE BAG - 1 KIT: Brand: TRAVELKIT CONTAINS FIRST STEP KIT AND SOILED SCOPE BAG

## (undated) DEVICE — BAG DECANTER

## (undated) DEVICE — HARMONIC ACE +7 LAPAROSCOPIC SHEARS ADVANCED HEMOSTASIS 5MM DIAMETER 36CM SHAFT LENGTH  FOR USE WITH GRAY HAND PIECE ONLY: Brand: HARMONIC ACE

## (undated) DEVICE — VISIGI 3D®  CALIBRATION SYSTEM  SIZE 36FR SLEEVE/STD: Brand: BOEHRINGER® VISIGI 3D™ SLEEVE GASTRECTOMY CALIBRATION SYSTEM, SIZE 36FR

## (undated) DEVICE — INTENDED FOR TISSUE SEPARATION, AND OTHER PROCEDURES THAT REQUIRE A SHARP SURGICAL BLADE TO PUNCTURE OR CUT.: Brand: BARD-PARKER SAFETY BLADES SIZE 15, STERILE

## (undated) DEVICE — PMI DISPOSABLE PUNCTURE CLOSURE DEVICE / SUTURE GRASPER: Brand: PMI

## (undated) DEVICE — SPRAY SET ENDO 360DEG GAS ASSIST FLEX APPLICATOR DUPLOSPRAY

## (undated) DEVICE — CHLORAPREP HI-LITE 26ML ORANGE

## (undated) DEVICE — Device

## (undated) DEVICE — TRIANGLE KNIFE BOX OF 6: Brand: ECTRA

## (undated) DEVICE — DRAPE SHEET THREE QUARTER

## (undated) DEVICE — MEDI-VAC YANKAUER SUCTION HANDLE W/STRAIGHT TIP & CONTROL VENT: Brand: CARDINAL HEALTH

## (undated) DEVICE — 3000CC GUARDIAN II: Brand: GUARDIAN

## (undated) DEVICE — COVIDIEN ENDO GIA PURPLE (MED) RELOAD 60MM

## (undated) DEVICE — 3M™ DURAPORE™ SURGICAL TAPE 1538-3, 3 INCH X 10 YARD (7,5CM X 9,1M), 4 ROLLS/BOX: Brand: 3M™ DURAPORE™

## (undated) DEVICE — URETERAL CATHETER ADAPTOR TIP

## (undated) DEVICE — GLOVE SRG BIOGEL 7.5

## (undated) DEVICE — ADHESIVE SKN CLSR HISTOACRYL FLEX 0.5ML LF

## (undated) DEVICE — ACE WRAP 3 IN STERILE

## (undated) DEVICE — NEEDLE 25G X 1 1/2

## (undated) DEVICE — SUT ETHIBOND 0 CT-1 30 IN X424H

## (undated) DEVICE — INTENDED FOR TISSUE SEPARATION, AND OTHER PROCEDURES THAT REQUIRE A SHARP SURGICAL BLADE TO PUNCTURE OR CUT.: Brand: BARD-PARKER ® CARBON RIB-BACK BLADES

## (undated) DEVICE — BETHLEHEM UNIVERSAL MINOR GEN: Brand: CARDINAL HEALTH

## (undated) DEVICE — TISSEEL FIBRIN 4ML FROZEN

## (undated) DEVICE — ENDOPATH XCEL BLADELESS TROCARS WITH STABILITY SLEEVES: Brand: ENDOPATH XCEL

## (undated) DEVICE — SPONGE LAP 18 X 18 IN STRL RFD

## (undated) DEVICE — TUBING SUCTION 5MM X 12 FT

## (undated) DEVICE — ELECTRODE LAP L WIRE E-Z CLEAN 33CM -0100

## (undated) DEVICE — LIGACLIP 10-M/L, 10MM ENDOSCOPIC ROTATING MULTIPLE CLIP APPLIERS: Brand: LIGACLIP

## (undated) DEVICE — ENDOSCOPIC CURVED INTRALUMINAL STAPLER (ILS) 16 TITANIUM ADJUSTABLE HEIGHT STAPLES

## (undated) DEVICE — TIBURON LAPAROSCOPIC ABDOMINAL DRAPE: Brand: CONVERTORS

## (undated) DEVICE — VIOLET BRAIDED (POLYGLACTIN 910), SYNTHETIC ABSORBABLE SUTURE: Brand: COATED VICRYL

## (undated) DEVICE — SUT MONOCRYL 3-0 PS-2 18 IN Y497G

## (undated) DEVICE — CUFF TOURNIQUET 18 X 4 IN QUICK CONNECT DISP 1 BLADDER

## (undated) DEVICE — ENDOPATH XCEL UNIVERSAL TROCAR STABLILITY SLEEVES: Brand: ENDOPATH XCEL

## (undated) DEVICE — COTTON TIP APPLICTOR 2 PK

## (undated) DEVICE — ADHESIVE SKIN CLSR DERMABOND NX

## (undated) DEVICE — SEAMGUARD STPL REINF ENDO GIA ULTRA UNIV 60 PURPLE

## (undated) DEVICE — GLOVE INDICATOR PI UNDERGLOVE SZ 7.5 BLUE

## (undated) DEVICE — GLOVE INDICATOR PI UNDERGLOVE SZ 8 BLUE

## (undated) DEVICE — [HIGH FLOW INSUFFLATOR,  DO NOT USE IF PACKAGE IS DAMAGED,  KEEP DRY,  KEEP AWAY FROM SUNLIGHT,  PROTECT FROM HEAT AND RADIOACTIVE SOURCES.]: Brand: PNEUMOSURE

## (undated) DEVICE — KERLIX BANDAGE ROLL: Brand: KERLIX

## (undated) DEVICE — INTRODUCER ANAL ABDOM EEA25 DISP STRL

## (undated) DEVICE — ACE WRAP 3 IN UNSTERILE

## (undated) DEVICE — PADDING,UNDERCAST,COTTON, 4"X4YD STERILE: Brand: MEDLINE

## (undated) DEVICE — NEEDLE SPINAL 22G X 5IN QUINCKE

## (undated) DEVICE — SUT MONOCRYL 4-0 PS-2 27 IN Y426H

## (undated) DEVICE — REM POLYHESIVE ADULT PATIENT RETURN ELECTRODE: Brand: VALLEYLAB

## (undated) DEVICE — OCCLUSIVE GAUZE STRIP,3% BISMUTH TRIBROMOPHENATE IN PETROLATUM BLEND: Brand: XEROFORM

## (undated) DEVICE — SUT STRATAFIX SPIRAL 4-0 PGA/PCL 30 X 30 CM SXMD2B409

## (undated) DEVICE — LIGHT HANDLE COVER SLEEVE DISP BLUE STELLAR

## (undated) DEVICE — ULTRACLEAN ACCESSORY ELECTRODE 1" (2.54 CM) COATED BLADE: Brand: ULTRACLEAN

## (undated) DEVICE — ACE WRAP 4 IN UNSTERILE

## (undated) DEVICE — SYRINGE 20ML LL

## (undated) DEVICE — STERILE ALLENTOWN HAND PACK: Brand: CARDINAL HEALTH

## (undated) DEVICE — STOCKINETTE REGULAR

## (undated) DEVICE — DRAPE TOWEL: Brand: CONVERTORS

## (undated) DEVICE — PROXIMATE SKIN STAPLERS (35 WIDE) CONTAINS 35 STAINLESS STEEL STAPLES (FIXED HEAD): Brand: PROXIMATE

## (undated) DEVICE — ANTI-FOG SOLUTION WITH FOAM PAD: Brand: DEVON

## (undated) DEVICE — 10 MM BABCOCKS WITH RATCHET HANDLES: Brand: ENDOPATH

## (undated) DEVICE — ADHESIVE SKIN CLOSR DERMABOND PRINEO

## (undated) DEVICE — GAUZE SPONGES,16 PLY: Brand: CURITY

## (undated) DEVICE — SKIN MARKER DUAL TIP WITH RULER CAP, FLEXIBLE RULER AND LABELS: Brand: DEVON

## (undated) DEVICE — PACK UNIVERSAL DRAPES SUB-Q ICD

## (undated) DEVICE — ABSORBABLE WOUND CLOSURE DEVICE: Brand: V-LOC 90

## (undated) DEVICE — BULB SYRINGE,IRRIGATION WITH PROTECTIVE CAP: Brand: DOVER

## (undated) DEVICE — ACE WRAP 6 IN UNSTERILE

## (undated) DEVICE — LIGACLIP MCA MULTIPLE CLIP APPLIERS, 20 SMALL CLIPS: Brand: LIGACLIP

## (undated) DEVICE — SINGLE-USE BIOPSY FORCEPS: Brand: RADIAL JAW 4

## (undated) DEVICE — SCD SEQUENTIAL COMPRESSION COMFORT SLEEVE MEDIUM KNEE LENGTH: Brand: KENDALL SCD

## (undated) DEVICE — GLOVE SRG BIOGEL 8

## (undated) DEVICE — ENDOPATH PNEUMONEEDLE INSUFFLATION NEEDLES WITH LUER LOCK CONNECTORS 120MM: Brand: ENDOPATH

## (undated) DEVICE — BLUE HEAT SCOPE WARMER

## (undated) DEVICE — STRL UNIVERSAL MINOR GENERAL: Brand: CARDINAL HEALTH

## (undated) DEVICE — 2000CC GUARDIAN II: Brand: GUARDIAN

## (undated) DEVICE — VIAL DECANTER

## (undated) DEVICE — TUBING SMOKE EVAC W/FILTRATION DEVICE PLUMEPORT ACTIV

## (undated) DEVICE — SURGICAL GOWN, XL SMARTSLEEVE: Brand: CONVERTORS

## (undated) DEVICE — GAUZE SPONGES,USP TYPE VII GAUZE, 12 PLY: Brand: CURITY

## (undated) DEVICE — PLUMEPEN PRO 10FT

## (undated) DEVICE — IRRIG ENDO FLO TUBING

## (undated) DEVICE — ENDOPOUCH RETRIEVER SPECIMEN RETRIEVAL BAGS: Brand: ENDOPOUCH RETRIEVER

## (undated) DEVICE — NEEDLE SPINAL 22G X 3.5IN  QUINCKE

## (undated) DEVICE — ENDOPATH 5MM CURVED SCISSORS WITH MONOPOLAR CAUTERY: Brand: ENDOPATH

## (undated) DEVICE — Device: Brand: DEFENDO AIR/WATER/SUCTION AND BIOPSY VALVE

## (undated) DEVICE — STAPLER INSORB SUBCUTICULAR 30 SINGLE USE